# Patient Record
Sex: FEMALE | Race: WHITE | NOT HISPANIC OR LATINO | Employment: OTHER | URBAN - METROPOLITAN AREA
[De-identification: names, ages, dates, MRNs, and addresses within clinical notes are randomized per-mention and may not be internally consistent; named-entity substitution may affect disease eponyms.]

---

## 2018-07-05 ENCOUNTER — CONVERSION ENCOUNTER (OUTPATIENT)
Dept: MAMMOGRAPHY | Facility: HOSPITAL | Age: 63
End: 2018-07-05

## 2019-01-16 ENCOUNTER — HOSPITAL ENCOUNTER (OUTPATIENT)
Dept: OTHER | Facility: HOSPITAL | Age: 64
Discharge: HOME | End: 2019-01-16
Attending: INTERNAL MEDICINE

## 2019-01-16 LAB
25(OH)D3 SERPL-MCNC: 52.1 NG/ML (ref 30–100)
ANION GAP SERPL CALC-SCNC: 19 MMOL/L (ref 8–19)
APPEARANCE UR: CLEAR
BASOPHILS # BLD AUTO: 0.07 10*3/UL (ref 0–0.2)
BASOPHILS NFR BLD AUTO: 0.69 % (ref 0–3)
BILIRUB UR QL: NEGATIVE
BUN SERPL-MCNC: 42 MG/DL (ref 5–25)
BUN/CREAT SERPL: 14 {RATIO} (ref 6–20)
CALCIUM SERPL-MCNC: 9.2 MG/DL (ref 8.7–10.4)
CHLORIDE SERPL-SCNC: 108 MMOL/L (ref 99–111)
COLOR UR: YELLOW
CONV CO2: 19 MMOL/L (ref 22–32)
CONV COLLECTION SOURCE (UA): NORMAL
CONV CREATININE URINE, RANDOM: 51.3 MG/DL (ref 10–300)
CONV UROBILINOGEN IN URINE BY AUTOMATED TEST STRIP: 0.2 {EHRLICHU}/DL (ref 0.1–1)
CREAT UR-MCNC: 2.9 MG/DL (ref 0.5–0.9)
EOSINOPHIL # BLD AUTO: 0.48 10*3/UL (ref 0–0.7)
EOSINOPHIL # BLD AUTO: 4.53 % (ref 0–7)
ERYTHROCYTE [DISTWIDTH] IN BLOOD BY AUTOMATED COUNT: 12.9 % (ref 11.5–14.5)
FERRITIN SERPL-MCNC: 190 NG/ML (ref 10–200)
GFR SERPLBLD BASED ON 1.73 SQ M-ARVRAT: 16 ML/MIN/{1.73_M2}
GLUCOSE SERPL-MCNC: 90 MG/DL (ref 65–99)
GLUCOSE UR QL: NEGATIVE MG/DL
HBA1C MFR BLD: 9.98 G/DL (ref 12–16)
HCT VFR BLD AUTO: 28.6 % (ref 37–47)
HGB UR QL STRIP: NEGATIVE
IRON SATN MFR SERPL: 16 % (ref 20–55)
IRON SERPL-MCNC: 53 UG/DL (ref 60–170)
KETONES UR QL STRIP: NEGATIVE MG/DL
LEUKOCYTE ESTERASE UR QL STRIP: NEGATIVE
LYMPHOCYTES # BLD AUTO: 3.1 10*3/UL (ref 1–5)
MCH RBC QN AUTO: 30.6 PG (ref 27–31)
MCHC RBC AUTO-ENTMCNC: 34.9 G/DL (ref 33–37)
MCV RBC AUTO: 87.7 FL (ref 81–99)
MONOCYTES # BLD AUTO: 0.96 10*3/UL (ref 0.2–1.2)
MONOCYTES NFR BLD AUTO: 9.07 % (ref 3–10)
NEUTROPHILS # BLD AUTO: 5.94 10*3/UL (ref 2–8)
NEUTROPHILS NFR BLD AUTO: 56.3 % (ref 30–85)
NITRITE UR QL STRIP: NEGATIVE
NRBC BLD AUTO-RTO: 0 % (ref 0–0.01)
OSMOLALITY SERPL CALC.SUM OF ELEC: 302 MOSM/KG (ref 273–304)
PH UR STRIP.AUTO: 5 [PH] (ref 5–8)
PHOSPHATE SERPL-MCNC: 2.9 MG/DL (ref 2.4–4.5)
PLATELET # BLD AUTO: 288 10*3/UL (ref 130–400)
PMV BLD AUTO: 8.8 FL (ref 7.4–10.4)
POTASSIUM SERPL-SCNC: 4.7 MMOL/L (ref 3.5–5.3)
PROT UR QL: NEGATIVE MG/DL
PROT UR-MCNC: <4 MG/DL
PTH-INTACT SERPL-MCNC: 115.5 PG/ML (ref 11.1–79.5)
RBC # BLD AUTO: 3.26 10*6/UL (ref 4.2–5.4)
SODIUM SERPL-SCNC: 141 MMOL/L (ref 135–147)
SP GR UR: 1.02 (ref 1–1.03)
TIBC SERPL-MCNC: 322 UG/DL (ref 245–450)
TRANSFERRIN SERPL-MCNC: 225 MG/DL (ref 250–380)
VARIANT LYMPHS NFR BLD MANUAL: 29.4 % (ref 20–45)
WBC # BLD AUTO: 10.5 10*3/UL (ref 4.8–10.8)

## 2019-02-22 ENCOUNTER — OFFICE VISIT CONVERTED (OUTPATIENT)
Dept: INTERNAL MEDICINE | Facility: CLINIC | Age: 64
End: 2019-02-22
Attending: NURSE PRACTITIONER

## 2019-03-26 ENCOUNTER — OFFICE VISIT CONVERTED (OUTPATIENT)
Dept: GASTROENTEROLOGY | Facility: CLINIC | Age: 64
End: 2019-03-26
Attending: PHYSICIAN ASSISTANT

## 2019-04-10 ENCOUNTER — HOSPITAL ENCOUNTER (OUTPATIENT)
Dept: OTHER | Facility: HOSPITAL | Age: 64
Discharge: HOME | End: 2019-04-10
Attending: INTERNAL MEDICINE

## 2019-04-10 LAB
25(OH)D3 SERPL-MCNC: 76.8 NG/ML (ref 30–100)
ANION GAP SERPL CALC-SCNC: 20 MMOL/L (ref 8–19)
APPEARANCE UR: CLEAR
BASOPHILS # BLD AUTO: 0.05 10*3/UL (ref 0–0.2)
BASOPHILS NFR BLD AUTO: 0.5 % (ref 0–3)
BILIRUB UR QL: NEGATIVE
BUN SERPL-MCNC: 63 MG/DL (ref 5–25)
BUN/CREAT SERPL: 16 {RATIO} (ref 6–20)
CALCIUM SERPL-MCNC: 9.3 MG/DL (ref 8.7–10.4)
CHLORIDE SERPL-SCNC: 106 MMOL/L (ref 99–111)
CHOLEST SERPL-MCNC: 114 MG/DL (ref 107–200)
CHOLEST/HDLC SERPL: 2.3 {RATIO} (ref 3–6)
COLOR UR: YELLOW
CONV ABS IMM GRAN: 0.02 10*3/UL (ref 0–0.2)
CONV CO2: 20 MMOL/L (ref 22–32)
CONV COLLECTION SOURCE (UA): NORMAL
CONV CREATININE URINE, RANDOM: 79.1 MG/DL (ref 10–300)
CONV IMMATURE GRAN: 0.2 % (ref 0–1.8)
CONV UROBILINOGEN IN URINE BY AUTOMATED TEST STRIP: 0.2 {EHRLICHU}/DL (ref 0.1–1)
CREAT UR-MCNC: 3.86 MG/DL (ref 0.5–0.9)
DEPRECATED RDW RBC AUTO: 49.7 FL (ref 36.4–46.3)
EOSINOPHIL # BLD AUTO: 0.74 10*3/UL (ref 0–0.7)
EOSINOPHIL # BLD AUTO: 7.4 % (ref 0–7)
ERYTHROCYTE [DISTWIDTH] IN BLOOD BY AUTOMATED COUNT: 14.8 % (ref 11.7–14.4)
FERRITIN SERPL-MCNC: 236 NG/ML (ref 10–200)
GFR SERPLBLD BASED ON 1.73 SQ M-ARVRAT: 12 ML/MIN/{1.73_M2}
GLUCOSE SERPL-MCNC: 100 MG/DL (ref 65–99)
GLUCOSE UR QL: NEGATIVE MG/DL
HBA1C MFR BLD: 9.4 G/DL (ref 12–16)
HCT VFR BLD AUTO: 29.9 % (ref 37–47)
HDLC SERPL-MCNC: 50 MG/DL (ref 40–60)
HGB UR QL STRIP: NEGATIVE
IRON SATN MFR SERPL: 13 % (ref 20–55)
IRON SERPL-MCNC: 35 UG/DL (ref 60–170)
KETONES UR QL STRIP: NEGATIVE MG/DL
LDLC SERPL CALC-MCNC: 41 MG/DL (ref 70–100)
LEUKOCYTE ESTERASE UR QL STRIP: NEGATIVE
LYMPHOCYTES # BLD AUTO: 2.68 10*3/UL (ref 1–5)
MCH RBC QN AUTO: 28.8 PG (ref 27–31)
MCHC RBC AUTO-ENTMCNC: 31.4 G/DL (ref 33–37)
MCV RBC AUTO: 91.7 FL (ref 81–99)
MONOCYTES # BLD AUTO: 0.73 10*3/UL (ref 0.2–1.2)
MONOCYTES NFR BLD AUTO: 7.3 % (ref 3–10)
NEUTROPHILS # BLD AUTO: 5.81 10*3/UL (ref 2–8)
NEUTROPHILS NFR BLD AUTO: 57.9 % (ref 30–85)
NITRITE UR QL STRIP: NEGATIVE
NRBC CBCN: 0 % (ref 0–0.7)
OSMOLALITY SERPL CALC.SUM OF ELEC: 310 MOSM/KG (ref 273–304)
PH UR STRIP.AUTO: 5 [PH] (ref 5–8)
PHOSPHATE SERPL-MCNC: 4.1 MG/DL (ref 2.4–4.5)
PLATELET # BLD AUTO: 204 10*3/UL (ref 130–400)
PMV BLD AUTO: 12.1 FL (ref 9.4–12.3)
POTASSIUM SERPL-SCNC: 4.6 MMOL/L (ref 3.5–5.3)
PROT UR QL: NEGATIVE MG/DL
PROT UR-MCNC: <4 MG/DL
PTH-INTACT SERPL-MCNC: 83.3 PG/ML (ref 11.1–79.5)
RBC # BLD AUTO: 3.26 10*6/UL (ref 4.2–5.4)
SODIUM SERPL-SCNC: 141 MMOL/L (ref 135–147)
SP GR UR: 1.01 (ref 1–1.03)
T4 FREE SERPL-MCNC: 0.8 NG/DL (ref 0.9–1.8)
TIBC SERPL-MCNC: 276 UG/DL (ref 245–450)
TRANSFERRIN SERPL-MCNC: 193 MG/DL (ref 250–380)
TRIGL SERPL-MCNC: 116 MG/DL (ref 40–150)
TSH SERPL-ACNC: 3.61 M[IU]/L (ref 0.27–4.2)
VARIANT LYMPHS NFR BLD MANUAL: 26.7 % (ref 20–45)
VLDLC SERPL-MCNC: 23 MG/DL (ref 5–37)
WBC # BLD AUTO: 10.03 10*3/UL (ref 4.8–10.8)

## 2019-04-22 ENCOUNTER — HOSPITAL ENCOUNTER (OUTPATIENT)
Dept: GASTROENTEROLOGY | Facility: HOSPITAL | Age: 64
Setting detail: HOSPITAL OUTPATIENT SURGERY
Discharge: HOME | End: 2019-04-22
Attending: INTERNAL MEDICINE

## 2019-05-01 ENCOUNTER — OFFICE VISIT CONVERTED (OUTPATIENT)
Dept: INTERNAL MEDICINE | Facility: CLINIC | Age: 64
End: 2019-05-01
Attending: NURSE PRACTITIONER

## 2019-05-02 ENCOUNTER — HOSPITAL ENCOUNTER (OUTPATIENT)
Dept: INFUSION THERAPY | Facility: HOSPITAL | Age: 64
Setting detail: RECURRING SERIES
Discharge: HOME | End: 2019-05-31
Attending: INTERNAL MEDICINE

## 2019-06-03 ENCOUNTER — OFFICE VISIT CONVERTED (OUTPATIENT)
Dept: INTERNAL MEDICINE | Facility: CLINIC | Age: 64
End: 2019-06-03
Attending: NURSE PRACTITIONER

## 2019-06-03 ENCOUNTER — CONVERSION ENCOUNTER (OUTPATIENT)
Dept: INTERNAL MEDICINE | Facility: CLINIC | Age: 64
End: 2019-06-03

## 2019-06-03 ENCOUNTER — HOSPITAL ENCOUNTER (OUTPATIENT)
Dept: OTHER | Facility: HOSPITAL | Age: 64
Discharge: HOME | End: 2019-06-03
Attending: NURSE PRACTITIONER

## 2019-06-03 PROBLEM — E78.5 HYPERLIPIDEMIA: Status: ACTIVE | Noted: 2019-06-03

## 2019-06-03 PROBLEM — I10 ESSENTIAL HYPERTENSION: Status: ACTIVE | Noted: 2019-06-03

## 2019-06-03 PROBLEM — R12 HEARTBURN: Status: ACTIVE | Noted: 2019-06-03

## 2019-06-03 PROBLEM — K21.9 GERD (GASTROESOPHAGEAL REFLUX DISEASE): Status: ACTIVE | Noted: 2019-06-03

## 2019-06-03 PROBLEM — M25.473 ANKLE SWELLING: Status: ACTIVE | Noted: 2019-06-03

## 2019-06-03 PROBLEM — M25.50 MULTIPLE JOINT PAIN: Status: ACTIVE | Noted: 2019-06-03

## 2019-06-03 PROBLEM — F41.9 ANXIETY: Status: ACTIVE | Noted: 2019-06-03

## 2019-06-03 PROBLEM — G47.33 OSA (OBSTRUCTIVE SLEEP APNEA): Status: ACTIVE | Noted: 2019-06-03

## 2019-06-03 PROBLEM — R53.82 CHRONIC FATIGUE: Status: ACTIVE | Noted: 2019-06-03

## 2019-06-03 LAB
APPEARANCE UR: ABNORMAL
BILIRUB UR QL: NEGATIVE
COLOR UR: YELLOW
CONV BACTERIA: NEGATIVE
CONV COLLECTION SOURCE (UA): ABNORMAL
CONV HYALINE CASTS IN URINE MICRO: ABNORMAL /[LPF]
CONV UROBILINOGEN IN URINE BY AUTOMATED TEST STRIP: 0.2 {EHRLICHU}/DL (ref 0.1–1)
GLUCOSE UR QL: NEGATIVE MG/DL
HGB UR QL STRIP: NEGATIVE
KETONES UR QL STRIP: NEGATIVE MG/DL
LEUKOCYTE ESTERASE UR QL STRIP: ABNORMAL
NITRITE UR QL STRIP: NEGATIVE
PH UR STRIP.AUTO: 5.5 [PH] (ref 5–8)
PROT UR QL: NEGATIVE MG/DL
RBC #/AREA URNS HPF: ABNORMAL /[HPF]
SP GR UR: 1.01 (ref 1–1.03)
SQUAMOUS SPT QL MICRO: ABNORMAL /[HPF]
WBC #/AREA URNS HPF: ABNORMAL /[HPF]

## 2019-06-05 LAB — BACTERIA UR CULT: NORMAL

## 2019-06-11 ENCOUNTER — HOSPITAL ENCOUNTER (OUTPATIENT)
Dept: OTHER | Facility: HOSPITAL | Age: 64
Discharge: HOME | End: 2019-06-11
Attending: INTERNAL MEDICINE

## 2019-06-11 LAB
25(OH)D3 SERPL-MCNC: 86.4 NG/ML (ref 30–100)
ANION GAP SERPL CALC-SCNC: 19 MMOL/L (ref 8–19)
APPEARANCE UR: ABNORMAL
BASOPHILS # BLD AUTO: 0.07 10*3/UL (ref 0–0.2)
BASOPHILS NFR BLD AUTO: 0.6 % (ref 0–3)
BILIRUB UR QL: NEGATIVE
BUN SERPL-MCNC: 38 MG/DL (ref 5–25)
BUN/CREAT SERPL: 13 {RATIO} (ref 6–20)
CALCIUM SERPL-MCNC: 9.7 MG/DL (ref 8.7–10.4)
CHLORIDE SERPL-SCNC: 103 MMOL/L (ref 99–111)
COLOR UR: YELLOW
CONV ABS IMM GRAN: 0.06 10*3/UL (ref 0–0.2)
CONV BACTERIA: ABNORMAL
CONV CO2: 26 MMOL/L (ref 22–32)
CONV COLLECTION SOURCE (UA): ABNORMAL
CONV CREATININE URINE, RANDOM: 86.1 MG/DL (ref 10–300)
CONV IMMATURE GRAN: 0.5 % (ref 0–1.8)
CONV UROBILINOGEN IN URINE BY AUTOMATED TEST STRIP: 0.2 {EHRLICHU}/DL (ref 0.1–1)
CREAT UR-MCNC: 2.84 MG/DL (ref 0.5–0.9)
DEPRECATED RDW RBC AUTO: 52.3 FL (ref 36.4–46.3)
EOSINOPHIL # BLD AUTO: 0.56 10*3/UL (ref 0–0.7)
EOSINOPHIL # BLD AUTO: 4.6 % (ref 0–7)
ERYTHROCYTE [DISTWIDTH] IN BLOOD BY AUTOMATED COUNT: 15.3 % (ref 11.7–14.4)
FERRITIN SERPL-MCNC: 687 NG/ML (ref 10–200)
GFR SERPLBLD BASED ON 1.73 SQ M-ARVRAT: 17 ML/MIN/{1.73_M2}
GLUCOSE SERPL-MCNC: 166 MG/DL (ref 65–99)
GLUCOSE UR QL: NEGATIVE MG/DL
HBA1C MFR BLD: 11.9 G/DL (ref 12–16)
HCT VFR BLD AUTO: 38.2 % (ref 37–47)
HGB UR QL STRIP: NEGATIVE
IRON SATN MFR SERPL: 23 % (ref 20–55)
IRON SERPL-MCNC: 64 UG/DL (ref 60–170)
KETONES UR QL STRIP: NEGATIVE MG/DL
LEUKOCYTE ESTERASE UR QL STRIP: ABNORMAL
LYMPHOCYTES # BLD AUTO: 3.33 10*3/UL (ref 1–5)
MCH RBC QN AUTO: 28.7 PG (ref 27–31)
MCHC RBC AUTO-ENTMCNC: 31.2 G/DL (ref 33–37)
MCV RBC AUTO: 92.3 FL (ref 81–99)
MONOCYTES # BLD AUTO: 0.48 10*3/UL (ref 0.2–1.2)
MONOCYTES NFR BLD AUTO: 4 % (ref 3–10)
NEUTROPHILS # BLD AUTO: 7.65 10*3/UL (ref 2–8)
NEUTROPHILS NFR BLD AUTO: 62.9 % (ref 30–85)
NITRITE UR QL STRIP: NEGATIVE
NRBC CBCN: 0 % (ref 0–0.7)
OSMOLALITY SERPL CALC.SUM OF ELEC: 311 MOSM/KG (ref 273–304)
PH UR STRIP.AUTO: 5.5 [PH] (ref 5–8)
PHOSPHATE SERPL-MCNC: 2.9 MG/DL (ref 2.4–4.5)
PLATELET # BLD AUTO: 241 10*3/UL (ref 130–400)
PMV BLD AUTO: 12.4 FL (ref 9.4–12.3)
POTASSIUM SERPL-SCNC: 3.5 MMOL/L (ref 3.5–5.3)
PROT UR QL: NEGATIVE MG/DL
PROT UR-MCNC: 5.9 MG/DL
PTH-INTACT SERPL-MCNC: 77.1 PG/ML (ref 11.1–79.5)
RBC # BLD AUTO: 4.14 10*6/UL (ref 4.2–5.4)
RBC #/AREA URNS HPF: ABNORMAL /[HPF]
SODIUM SERPL-SCNC: 144 MMOL/L (ref 135–147)
SP GR UR: 1.01 (ref 1–1.03)
T4 FREE SERPL-MCNC: 0.9 NG/DL (ref 0.9–1.8)
TIBC SERPL-MCNC: 280 UG/DL (ref 245–450)
TRANSFERRIN SERPL-MCNC: 196 MG/DL (ref 250–380)
TSH SERPL-ACNC: 3.63 M[IU]/L (ref 0.27–4.2)
VARIANT LYMPHS NFR BLD MANUAL: 27.4 % (ref 20–45)
WBC # BLD AUTO: 12.15 10*3/UL (ref 4.8–10.8)
WBC #/AREA URNS HPF: ABNORMAL /[HPF]

## 2019-06-14 DIAGNOSIS — G47.33 OSA (OBSTRUCTIVE SLEEP APNEA): ICD-10-CM

## 2019-06-14 DIAGNOSIS — K21.9 GASTROESOPHAGEAL REFLUX DISEASE, ESOPHAGITIS PRESENCE NOT SPECIFIED: ICD-10-CM

## 2019-06-14 DIAGNOSIS — E78.5 HYPERLIPIDEMIA, UNSPECIFIED HYPERLIPIDEMIA TYPE: ICD-10-CM

## 2019-06-14 DIAGNOSIS — E66.01 OBESITY, CLASS III, BMI 40-49.9 (MORBID OBESITY) (HCC): Primary | ICD-10-CM

## 2019-06-14 DIAGNOSIS — M25.50 MULTIPLE JOINT PAIN: ICD-10-CM

## 2019-06-14 DIAGNOSIS — I10 ESSENTIAL HYPERTENSION: ICD-10-CM

## 2019-06-14 PROBLEM — E66.813 OBESITY, CLASS III, BMI 40-49.9 (MORBID OBESITY): Status: ACTIVE | Noted: 2019-06-14

## 2019-06-17 ENCOUNTER — LAB (OUTPATIENT)
Dept: LAB | Facility: HOSPITAL | Age: 64
End: 2019-06-17

## 2019-06-17 ENCOUNTER — CONSULT (OUTPATIENT)
Dept: BARIATRICS/WEIGHT MGMT | Facility: CLINIC | Age: 64
End: 2019-06-17

## 2019-06-17 VITALS
WEIGHT: 290 LBS | SYSTOLIC BLOOD PRESSURE: 140 MMHG | HEIGHT: 61 IN | HEART RATE: 61 BPM | DIASTOLIC BLOOD PRESSURE: 84 MMHG | RESPIRATION RATE: 18 BRPM | BODY MASS INDEX: 54.75 KG/M2 | TEMPERATURE: 97 F

## 2019-06-17 DIAGNOSIS — R53.82 CHRONIC FATIGUE: ICD-10-CM

## 2019-06-17 DIAGNOSIS — G47.33 OSA (OBSTRUCTIVE SLEEP APNEA): ICD-10-CM

## 2019-06-17 DIAGNOSIS — M25.473 ANKLE SWELLING, UNSPECIFIED LATERALITY: ICD-10-CM

## 2019-06-17 DIAGNOSIS — K21.9 GASTROESOPHAGEAL REFLUX DISEASE, ESOPHAGITIS PRESENCE NOT SPECIFIED: ICD-10-CM

## 2019-06-17 DIAGNOSIS — I10 ESSENTIAL HYPERTENSION: ICD-10-CM

## 2019-06-17 DIAGNOSIS — E78.5 HYPERLIPIDEMIA, UNSPECIFIED HYPERLIPIDEMIA TYPE: ICD-10-CM

## 2019-06-17 DIAGNOSIS — M25.50 MULTIPLE JOINT PAIN: ICD-10-CM

## 2019-06-17 DIAGNOSIS — F41.9 ANXIETY: ICD-10-CM

## 2019-06-17 DIAGNOSIS — E66.01 OBESITY, CLASS III, BMI 40-49.9 (MORBID OBESITY) (HCC): ICD-10-CM

## 2019-06-17 PROBLEM — E55.9 VITAMIN D DEFICIENCY: Status: ACTIVE | Noted: 2017-12-29

## 2019-06-17 PROBLEM — N18.4: Status: ACTIVE | Noted: 2017-09-29

## 2019-06-17 PROBLEM — N18.9 CHRONIC KIDNEY DISEASE: Status: ACTIVE | Noted: 2017-09-29

## 2019-06-17 LAB
ALBUMIN SERPL-MCNC: 4.2 G/DL (ref 3.5–5.2)
ALBUMIN/GLOB SERPL: 1.2 G/DL
ALP SERPL-CCNC: 98 U/L (ref 39–117)
ALT SERPL W P-5'-P-CCNC: 19 U/L (ref 1–33)
ANION GAP SERPL CALCULATED.3IONS-SCNC: 15.1 MMOL/L
AST SERPL-CCNC: 22 U/L (ref 1–32)
BASOPHILS # BLD AUTO: 0.06 10*3/MM3 (ref 0–0.2)
BASOPHILS NFR BLD AUTO: 0.5 % (ref 0–1.5)
BILIRUB SERPL-MCNC: 0.3 MG/DL (ref 0.2–1.2)
BUN BLD-MCNC: 39 MG/DL (ref 8–23)
BUN/CREAT SERPL: 13.6 (ref 7–25)
CALCIUM SPEC-SCNC: 9.8 MG/DL (ref 8.6–10.5)
CHLORIDE SERPL-SCNC: 102 MMOL/L (ref 98–107)
CHOLEST SERPL-MCNC: 138 MG/DL (ref 0–200)
CO2 SERPL-SCNC: 24.9 MMOL/L (ref 22–29)
CREAT BLD-MCNC: 2.87 MG/DL (ref 0.57–1)
DEPRECATED RDW RBC AUTO: 52.7 FL (ref 37–54)
EOSINOPHIL # BLD AUTO: 0.56 10*3/MM3 (ref 0–0.4)
EOSINOPHIL NFR BLD AUTO: 5.1 % (ref 0.3–6.2)
ERYTHROCYTE [DISTWIDTH] IN BLOOD BY AUTOMATED COUNT: 15.4 % (ref 12.3–15.4)
GFR SERPL CREATININE-BSD FRML MDRD: 17 ML/MIN/1.73
GLOBULIN UR ELPH-MCNC: 3.4 GM/DL
GLUCOSE BLD-MCNC: 104 MG/DL (ref 65–99)
HBA1C MFR BLD: 5.1 % (ref 4.8–5.6)
HCT VFR BLD AUTO: 36.4 % (ref 34–46.6)
HDLC SERPL-MCNC: 50 MG/DL (ref 40–60)
HGB BLD-MCNC: 11.2 G/DL (ref 12–15.9)
IMM GRANULOCYTES # BLD AUTO: 0.05 10*3/MM3 (ref 0–0.05)
IMM GRANULOCYTES NFR BLD AUTO: 0.5 % (ref 0–0.5)
LDLC SERPL CALC-MCNC: 60 MG/DL (ref 0–100)
LDLC/HDLC SERPL: 1.2 {RATIO}
LYMPHOCYTES # BLD AUTO: 3.66 10*3/MM3 (ref 0.7–3.1)
LYMPHOCYTES NFR BLD AUTO: 33.1 % (ref 19.6–45.3)
MCH RBC QN AUTO: 28.7 PG (ref 26.6–33)
MCHC RBC AUTO-ENTMCNC: 30.8 G/DL (ref 31.5–35.7)
MCV RBC AUTO: 93.3 FL (ref 79–97)
MONOCYTES # BLD AUTO: 0.92 10*3/MM3 (ref 0.1–0.9)
MONOCYTES NFR BLD AUTO: 8.3 % (ref 5–12)
NEUTROPHILS # BLD AUTO: 5.82 10*3/MM3 (ref 1.7–7)
NEUTROPHILS NFR BLD AUTO: 52.5 % (ref 42.7–76)
NRBC BLD AUTO-RTO: 0 /100 WBC (ref 0–0.2)
PLATELET # BLD AUTO: 211 10*3/MM3 (ref 140–450)
PMV BLD AUTO: 12 FL (ref 6–12)
POTASSIUM BLD-SCNC: 3.7 MMOL/L (ref 3.5–5.2)
PROT SERPL-MCNC: 7.6 G/DL (ref 6–8.5)
RBC # BLD AUTO: 3.9 10*6/MM3 (ref 3.77–5.28)
SODIUM BLD-SCNC: 142 MMOL/L (ref 136–145)
TRIGL SERPL-MCNC: 140 MG/DL (ref 0–150)
TSH SERPL DL<=0.05 MIU/L-ACNC: 7.91 MIU/ML (ref 0.27–4.2)
VLDLC SERPL-MCNC: 28 MG/DL (ref 5–40)
WBC NRBC COR # BLD: 11.07 10*3/MM3 (ref 3.4–10.8)

## 2019-06-17 PROCEDURE — 85025 COMPLETE CBC W/AUTO DIFF WBC: CPT

## 2019-06-17 PROCEDURE — 84443 ASSAY THYROID STIM HORMONE: CPT

## 2019-06-17 PROCEDURE — 36415 COLL VENOUS BLD VENIPUNCTURE: CPT

## 2019-06-17 PROCEDURE — 83036 HEMOGLOBIN GLYCOSYLATED A1C: CPT

## 2019-06-17 PROCEDURE — 80053 COMPREHEN METABOLIC PANEL: CPT

## 2019-06-17 PROCEDURE — 80061 LIPID PANEL: CPT

## 2019-06-17 PROCEDURE — 99205 OFFICE O/P NEW HI 60 MIN: CPT | Performed by: NURSE PRACTITIONER

## 2019-06-17 NOTE — PROGRESS NOTES
[facility] BARIATRIC [facility] BARIATRIC SURGERY  [address] [facility] [address]  [facility] [id number] [phone]  [address] [address] [phone]  Dept: [phone]  6/17/2019      Ro Connor.  [id number]  [id number]  1955  female      Chief Complaint of weight gain; unable to maintain weight loss    History of Present Illness:   Ro is a 63 y.o. female who presents today for evaluation, education and consultation regarding weight loss surgery. The patient is interested in the sleeve gastrectomy.      Diet History:Ro has been overweight for at least 50 years, has been 35 pounds or more overweight for at least 46 years, has been 100 pounds or more overweight for 15 or more years and started dieting at age 15.  The most weight Ro lost was 50 pounds on weight watchers and maintained the weight loss for 2 years. Ro describes her eating habits as drinking coffee and soda eating to cope with anxiety making dietary choices out of convenience. Ro Connor has tried Atkins, Weight Watchers, Fasting and reduced calorie among others with success of losing up to 50 pounds, but in each instance regained the weight.   See dietician documentation for complete history.    Bariatric Surgery Evaluation: The patient is being seen for an initial visit for bariatric surgery evaluation.     Bariatric Co-morbidities:  sleep apnea, hypertension, dyslipidemia, GERD, edema and mental health disease    Patient Active Problem List   Diagnosis   • Chronic fatigue   • Ankle swelling   • Essential hypertension   • Hyperlipidemia   • SEVEN (obstructive sleep apnea)   • GERD (gastroesophageal reflux disease)   • Heartburn   • Multiple joint pain   • Anxiety   • Body mass index (BMI) of 50-59.9 in adult (CMS/HCC)   • Renal failure, chronic, stage 4 (severe) (CMS/HCC)   • Other kyphoscoliosis and scoliosis   • Vitamin D deficiency       History reviewed. No pertinent  past medical history.    Past Surgical History:   Procedure Laterality Date   • COLONOSCOPY  2014   • ENDOSCOPY  2009       No Known Allergies      Current Outpatient Medications:   •  aspirin 81 MG EC tablet, Take 81 mg by mouth Daily., Disp: , Rfl:   •  calcium carbonate (OS-TUTU) 600 MG tablet, Take 600 mg by mouth Daily., Disp: , Rfl:   •  carvedilol (COREG) 6.25 MG tablet, Take 6.25 mg by mouth 2 (Two) Times a Day With Meals., Disp: , Rfl:   •  escitalopram (LEXAPRO) 20 MG tablet, Take 20 mg by mouth Daily., Disp: , Rfl:   •  ferrous sulfate 325 (65 FE) MG tablet, Take 325 mg by mouth Daily With Breakfast., Disp: , Rfl:   •  furosemide (LASIX) 20 MG tablet, Take 20 mg by mouth 2 (Two) Times a Day., Disp: , Rfl:   •  Glucosamine-Chondroitin (GLUCOSAMINE CHONDR COMPLEX PO), Take  by mouth., Disp: , Rfl:   •  HYDROcodone-acetaminophen (NORCO) 7.5-325 MG per tablet, Take 1 tablet by mouth Every 6 (Six) Hours As Needed for Moderate Pain ., Disp: , Rfl:   •  hydrOXYzine (ATARAX) 50 MG tablet, Take 50 mg by mouth 3 (Three) Times a Day As Needed for Itching., Disp: , Rfl:   •  methIMAzole (TAPAZOLE) 5 MG tablet, Take 5 mg by mouth 3 (Three) Times a Day., Disp: , Rfl:   •  pantoprazole (PROTONIX) 40 MG EC tablet, Take 40 mg by mouth Daily., Disp: , Rfl:   •  Pediatric Multiple Vit-C-FA (FLINSTONES GUMMIES OMEGA-3 DHA PO), Take  by mouth., Disp: , Rfl:   •  raNITIdine (ZANTAC) 150 MG tablet, Take 150 mg by mouth 2 (Two) Times a Day., Disp: , Rfl:   •  simvastatin (ZOCOR) 20 MG tablet, Take 20 mg by mouth Every Night., Disp: , Rfl:   •  traZODone (DESYREL) 50 MG tablet, Take 50 mg by mouth Every Night., Disp: , Rfl:   •  vitamin D (ERGOCALCIFEROL) 66962 units capsule capsule, Take 50,000 Units by mouth 1 (One) Time Per Week., Disp: , Rfl:     Social History     Socioeconomic History   • Marital status:      Spouse name: Not on file   • Number of children: Not on file   • Years of education: Not on file   • Highest  education level: Not on file   Tobacco Use   • Smoking status: Never Smoker   • Smokeless tobacco: Never Used   Substance and Sexual Activity   • Alcohol use: Yes   • Drug use: No       Family History   Problem Relation Age of Onset   • Obesity Mother    • Hypertension Mother    • Obesity Sister    • Hypertension Sister    • Stroke Sister    • Obesity Brother    • Hypertension Brother    • Heart attack Brother    • Obesity Maternal Grandmother    • Hypertension Maternal Grandmother    • Cancer Maternal Grandmother         BRAIN   • Cancer Maternal Grandfather    • Hypertension Paternal Grandmother    • Hypertension Brother    • Hypertension Sister    • Stroke Sister          Review of Systems:  Review of Systems   Constitutional: Positive for appetite change. Negative for fatigue and unexpected weight change.   HENT: Negative.    Eyes: Negative.    Respiratory: Negative.    Cardiovascular: Negative.  Negative for leg swelling.   Gastrointestinal: Negative for abdominal distention, abdominal pain, constipation, diarrhea, nausea and vomiting.   Genitourinary: Negative for difficulty urinating, frequency and urgency.   Musculoskeletal: Negative for back pain.   Skin: Negative.    Psychiatric/Behavioral: Negative.    All other systems reviewed and are negative.      Physical Exam:  Vital Signs:  Weight: 132 kg (290 lb)   Body mass index is 55.47 kg/m².  Temp: 97 °F (36.1 °C)   Heart Rate: 61   BP: 140/84     Physical Exam   Constitutional: She is oriented to person, place, and time. She appears well-developed and well-nourished.   HENT:   Head: Normocephalic and atraumatic.   Neck: Normal range of motion.   Cardiovascular: Normal rate, regular rhythm and normal heart sounds.   Pulmonary/Chest: Effort normal and breath sounds normal. No respiratory distress. She has no wheezes.   Abdominal: Soft. Bowel sounds are normal. She exhibits no distension. There is no tenderness.   Musculoskeletal: She exhibits no edema or  deformity.   Neurological: She is alert and oriented to person, place, and time.   Skin: Skin is warm and dry.   Psychiatric: She has a normal mood and affect. Her behavior is normal.   Nursing note and vitals reviewed.         Assessment:         Ro Connor is a 63 y.o. year old female with medically complicated severe obesity. Weight: 132 kg (290 lb), Body mass index is 55.47 kg/m². and weight related problems including sleep apnea, hypertension, dyslipidemia, GERD, edema and mental health disease.    I explained in detail the procedures that we are performing.  All of those procedures can be performed laparoscopically but there is a chance to convert to open if any technical challenges or complications do occur.  Bariatric surgery is not cosmetic surgery but rather a tool to help a patient make a life-long commitment lifestyle changes including diet, exercise, behavior changes, and taking supplemental vitamins and minerals.    Due to the patient's BMI and co-morbidities they are at a high risk for surgery and will obtain the following:  The patient has been advised that a letter of medical support and a history and physical must be obtained from her primary care physician. A psychological evaluation will be arranged for this patient. CBC, CMP, FLP, TSH and HgbA1C will be drawn. Ro Connor will obtain a pre-operative CXR and EKG. she will obtain clearance from her nephrologist regarding her chronic kidney disease and protein restriction.    Ro Connor will be set up for a pre-operative diagnostic esophagogastroduodenoscopy with biopsy for evaluation. The risks and benefits of the procedure were discussed with the patient in detail and all questions were answered.  Possibility of perforation, bleeding, aspiration, anoxic brain injury, respiratory and/or cardiac arrest and death were discussed.   She received handouts regarding, all questions were answered and informed consent was  obtained.     The risks, benefits, alternatives, and potential complications of all of the procedures were explained in detail including, but not limited to death, anesthesia and medication adverse effect/DVT, pulmonary embolism, trocar site/incisional hernia, wound infection, abdominal infection, bleeding, failure to lose weight or gain weight and change in body image, metabolic complications with calcium, thiamine, vitamin B12, folate, iron, and anemia.    The patient was advised to start a high protein, low fat and low carbohydrate diet. The patient was given individualized information by our dietician along with general group information and handouts.       The patient was given information regarding the BECKI educational video. BECKI is an internet based educational video which explains the surgical procedure and answers basic questions regarding the procedure. The patient was provided with instructions and a password to watch the video.    The patient was encouraged to start routine exercise including but not limited to 150 minutes per week. The patient received a resistance band along with a handout of exercises.     The consultation plan was reviewed with the patient.    The patient understands the surgical procedures and the different surgical options that are available.  She understands the lifestyle changes that would be required after surgery and has agreed to participate in a pre-operative and postoperative weight management program.  She also expressed understanding of possible risks, had several questions answered and desires to proceed.    I think she is a good candidate for this surgery, and is interested in a sleeve gastrectomy.    Encounter Diagnoses   Name Primary?   • Body mass index (BMI) of 50-59.9 in adult (CMS/[facility]) Yes   • Gastroesophageal reflux disease, esophagitis presence not specified    • Essential hypertension    • SEVEN (obstructive sleep apnea)    • Hyperlipidemia, unspecified  hyperlipidemia type    • Multiple joint pain    • Ankle swelling, unspecified laterality    • Anxiety    • Chronic fatigue        Plan:    Patient will have evaluations and follow up with bariatric dieticians and a psychologist before undergoing a multidisciplinary review of her candidacy.  We also discussed the weight loss requirement and rationale, and other program requirements.      Idania Chacon, MAYUR  6/17/2019

## 2019-06-18 NOTE — PROGRESS NOTES
"Bariatric Nutrition Counseling Interview    Patient Name:  Ro Connor  YOB: 1955  Age:  63 y.o.  Sex:  female  MRN: [id number]  Date:  06/18/19    Procedure Considering:      Last Documented Height:    Ht Readings from Last 1 Encounters:   06/17/19 154 cm (60.63\")     Last Documented Weight:   Wt Readings from Last 1 Encounters:   06/17/19 132 kg (290 lb)      Body mass index is 55.47 kg/m².    Highest Weight:  254  Goal Weight: 150-175    History:  History reviewed. No pertinent past medical history.  Past Surgical History:   Procedure Laterality Date   • COLONOSCOPY  2014   • ENDOSCOPY  2009     Family History   Problem Relation Age of Onset   • Obesity Mother    • Hypertension Mother    • Obesity Sister    • Hypertension Sister    • Stroke Sister    • Obesity Brother    • Hypertension Brother    • Heart attack Brother    • Obesity Maternal Grandmother    • Hypertension Maternal Grandmother    • Cancer Maternal Grandmother         BRAIN   • Cancer Maternal Grandfather    • Hypertension Paternal Grandmother    • Hypertension Brother    • Hypertension Sister    • Stroke Sister      Social History     Socioeconomic History   • Marital status:      Spouse name: Not on file   • Number of children: Not on file   • Years of education: Not on file   • Highest education level: Not on file   Tobacco Use   • Smoking status: Never Smoker   • Smokeless tobacco: Never Used   Substance and Sexual Activity   • Alcohol use: Yes   • Drug use: No     Additional Health Issues to Consider:  Stage 4 CKD per pt    Weight History:  Always been overweight    Previous Weight Loss Efforts:  Weight Watchers, [employer] diet, Calorie counting, Over the counter weight loss aids, High protein low carb diet  Most Successful Weight Loss Effort:  Weight Watchers    Eating Habits: Eat in response to stress  Eat three meals on most days?  unknown  Worst eating habit?  Particular food craving    How often do you eat " fast food? 2 times monthly    Do you exercise regularly? (at least 3 times each week)  No    Occupation:  Retired    Personal Goal After Procedure:  Lose weight to qualify for kidney transplant     Assessment:  Reviewed nutrition program for successful weight loss following surgery. Discussed personal habits and lifestyle behaviors that may influence diet efforts. Program materials, including a reduced calorie diet regimen, were provided, discussed, and recommended as the plan to follow for pre- and post-surgery efforts. The significance of routine exercise was discussed. She demonstrated a good comprehension of diet requirements as well as a commitment to work on personal challenges.  Provided pt with short term goals to meet prior to 2-week consult appointment and is to report on progress at follow up visits. Per pt, nephrologist wants to limit protein intake to 25g per day with no fluid restriction. Will advise pt on low erasmo, low carb liver shrinking diet and to meet protein needs per nephrologist. She is a good candidate for bariatric surgery.    Electronically signed by:  Crystal Harman RD  06/18/19 2:10 PM

## 2019-06-19 ENCOUNTER — TELEPHONE (OUTPATIENT)
Dept: BARIATRICS/WEIGHT MGMT | Facility: CLINIC | Age: 64
End: 2019-06-19

## 2019-06-19 NOTE — TELEPHONE ENCOUNTER
Spoke to patient regarding her intake lab results. Sent letter of lab results to patient to share with her PCP.

## 2019-06-20 ENCOUNTER — PREP FOR SURGERY (OUTPATIENT)
Dept: OTHER | Facility: HOSPITAL | Age: 64
End: 2019-06-20

## 2019-06-20 ENCOUNTER — HOSPITAL ENCOUNTER (OUTPATIENT)
Dept: OTHER | Facility: HOSPITAL | Age: 64
Discharge: HOME | End: 2019-06-20

## 2019-06-26 ENCOUNTER — TELEPHONE (OUTPATIENT)
Dept: BARIATRICS/WEIGHT MGMT | Facility: CLINIC | Age: 64
End: 2019-06-26

## 2019-06-26 DIAGNOSIS — N18.4 RENAL FAILURE, CHRONIC, STAGE 4 (SEVERE) (HCC): ICD-10-CM

## 2019-06-26 DIAGNOSIS — E55.9 VITAMIN D DEFICIENCY: ICD-10-CM

## 2019-06-26 DIAGNOSIS — E78.5 HYPERLIPIDEMIA, UNSPECIFIED HYPERLIPIDEMIA TYPE: ICD-10-CM

## 2019-06-26 DIAGNOSIS — M25.473 ANKLE SWELLING, UNSPECIFIED LATERALITY: ICD-10-CM

## 2019-06-26 DIAGNOSIS — I10 ESSENTIAL HYPERTENSION: ICD-10-CM

## 2019-06-26 NOTE — TELEPHONE ENCOUNTER
Spoke to patient and instructed her that we will need cardiac clearance to proceed with surgery. Patient gave verbal understanding. Referral has been sent.

## 2019-07-03 DIAGNOSIS — G47.33 OSA (OBSTRUCTIVE SLEEP APNEA): ICD-10-CM

## 2019-07-03 DIAGNOSIS — R53.82 CHRONIC FATIGUE: ICD-10-CM

## 2019-07-03 DIAGNOSIS — I10 ESSENTIAL HYPERTENSION: ICD-10-CM

## 2019-07-08 ENCOUNTER — HOSPITAL ENCOUNTER (OUTPATIENT)
Dept: MAMMOGRAPHY | Facility: HOSPITAL | Age: 64
Discharge: HOME | End: 2019-07-08
Attending: NURSE PRACTITIONER

## 2019-07-10 ENCOUNTER — CONVERSION ENCOUNTER (OUTPATIENT)
Dept: INTERNAL MEDICINE | Facility: CLINIC | Age: 64
End: 2019-07-10

## 2019-07-10 ENCOUNTER — HOSPITAL ENCOUNTER (OUTPATIENT)
Dept: OTHER | Facility: HOSPITAL | Age: 64
Discharge: HOME | End: 2019-07-10
Attending: NURSE PRACTITIONER

## 2019-07-10 ENCOUNTER — OFFICE VISIT CONVERTED (OUTPATIENT)
Dept: INTERNAL MEDICINE | Facility: CLINIC | Age: 64
End: 2019-07-10
Attending: NURSE PRACTITIONER

## 2019-07-10 LAB
T4 FREE SERPL-MCNC: 0.9 NG/DL (ref 0.9–1.8)
TSH SERPL-ACNC: 5.87 M[IU]/L (ref 0.27–4.2)

## 2019-07-18 ENCOUNTER — OFFICE VISIT (OUTPATIENT)
Dept: CARDIOLOGY | Facility: CLINIC | Age: 64
End: 2019-07-18

## 2019-07-18 VITALS
SYSTOLIC BLOOD PRESSURE: 130 MMHG | WEIGHT: 228.9 LBS | HEIGHT: 60 IN | HEART RATE: 66 BPM | DIASTOLIC BLOOD PRESSURE: 78 MMHG | BODY MASS INDEX: 44.94 KG/M2

## 2019-07-18 DIAGNOSIS — I10 ESSENTIAL HYPERTENSION: Primary | ICD-10-CM

## 2019-07-18 DIAGNOSIS — E78.5 HYPERLIPIDEMIA, UNSPECIFIED HYPERLIPIDEMIA TYPE: ICD-10-CM

## 2019-07-18 PROCEDURE — 99203 OFFICE O/P NEW LOW 30 MIN: CPT | Performed by: INTERNAL MEDICINE

## 2019-07-18 RX ORDER — CHOLECALCIFEROL (VITAMIN D3) 125 MCG
5 CAPSULE ORAL
COMMUNITY
End: 2019-11-07

## 2019-07-23 ENCOUNTER — HOSPITAL ENCOUNTER (OUTPATIENT)
Dept: ULTRASOUND IMAGING | Facility: HOSPITAL | Age: 64
Discharge: HOME | End: 2019-07-23
Attending: NURSE PRACTITIONER

## 2019-07-23 LAB — 25(OH)D3 SERPL-MCNC: 67.4 NG/ML (ref 30–100)

## 2019-08-05 ENCOUNTER — TELEPHONE (OUTPATIENT)
Dept: BARIATRICS/WEIGHT MGMT | Facility: CLINIC | Age: 64
End: 2019-08-05

## 2019-08-05 NOTE — TELEPHONE ENCOUNTER
PETEM for pt to call office regarding chest x ray results          ----- Message from MAYUR Crandall sent at 8/5/2019  8:53 AM EDT -----  Normal CXR report

## 2019-08-12 ENCOUNTER — OFFICE VISIT CONVERTED (OUTPATIENT)
Dept: INTERNAL MEDICINE | Facility: CLINIC | Age: 64
End: 2019-08-12
Attending: NURSE PRACTITIONER

## 2019-08-12 ENCOUNTER — CONVERSION ENCOUNTER (OUTPATIENT)
Dept: INTERNAL MEDICINE | Facility: CLINIC | Age: 64
End: 2019-08-12

## 2019-08-12 ENCOUNTER — HOSPITAL ENCOUNTER (OUTPATIENT)
Dept: OTHER | Facility: HOSPITAL | Age: 64
Discharge: HOME | End: 2019-08-12
Attending: NURSE PRACTITIONER

## 2019-08-12 LAB
ALBUMIN SERPL-MCNC: 4.1 G/DL (ref 3.5–5)
ALBUMIN/GLOB SERPL: 1.1 {RATIO} (ref 1.4–2.6)
ALP SERPL-CCNC: 85 U/L (ref 43–160)
ALT SERPL-CCNC: 20 U/L (ref 10–40)
ANION GAP SERPL CALC-SCNC: 18 MMOL/L (ref 8–19)
AST SERPL-CCNC: 22 U/L (ref 15–50)
BASOPHILS # BLD AUTO: 0.06 10*3/UL (ref 0–0.2)
BASOPHILS NFR BLD AUTO: 0.5 % (ref 0–3)
BILIRUB SERPL-MCNC: 0.31 MG/DL (ref 0.2–1.3)
BUN SERPL-MCNC: 52 MG/DL (ref 5–25)
BUN/CREAT SERPL: 19 {RATIO} (ref 6–20)
CALCIUM SERPL-MCNC: 9.6 MG/DL (ref 8.7–10.4)
CHLORIDE SERPL-SCNC: 103 MMOL/L (ref 99–111)
CONV ABS IMM GRAN: 0.06 10*3/UL (ref 0–0.2)
CONV CO2: 24 MMOL/L (ref 22–32)
CONV IMMATURE GRAN: 0.5 % (ref 0–1.8)
CONV TOTAL PROTEIN: 7.7 G/DL (ref 6.3–8.2)
CREAT UR-MCNC: 2.8 MG/DL (ref 0.5–0.9)
DEPRECATED RDW RBC AUTO: 49.1 FL (ref 36.4–46.3)
EOSINOPHIL # BLD AUTO: 0.19 10*3/UL (ref 0–0.7)
EOSINOPHIL # BLD AUTO: 1.5 % (ref 0–7)
ERYTHROCYTE [DISTWIDTH] IN BLOOD BY AUTOMATED COUNT: 14.5 % (ref 11.7–14.4)
FOLATE SERPL-MCNC: >20 NG/ML (ref 4.8–20)
GFR SERPLBLD BASED ON 1.73 SQ M-ARVRAT: 17 ML/MIN/{1.73_M2}
GLOBULIN UR ELPH-MCNC: 3.6 G/DL (ref 2–3.5)
GLUCOSE SERPL-MCNC: 95 MG/DL (ref 65–99)
HCT VFR BLD AUTO: 36.4 % (ref 37–47)
HGB BLD-MCNC: 11.7 G/DL (ref 12–16)
LYMPHOCYTES # BLD AUTO: 3.87 10*3/UL (ref 1–5)
LYMPHOCYTES NFR BLD AUTO: 30.4 % (ref 20–45)
MCH RBC QN AUTO: 29.7 PG (ref 27–31)
MCHC RBC AUTO-ENTMCNC: 32.1 G/DL (ref 33–37)
MCV RBC AUTO: 92.4 FL (ref 81–99)
MONOCYTES # BLD AUTO: 1.03 10*3/UL (ref 0.2–1.2)
MONOCYTES NFR BLD AUTO: 8.1 % (ref 3–10)
NEUTROPHILS # BLD AUTO: 7.51 10*3/UL (ref 2–8)
NEUTROPHILS NFR BLD AUTO: 59 % (ref 30–85)
NRBC CBCN: 0 % (ref 0–0.7)
OSMOLALITY SERPL CALC.SUM OF ELEC: 306 MOSM/KG (ref 273–304)
PLATELET # BLD AUTO: 215 10*3/UL (ref 130–400)
PMV BLD AUTO: 12.8 FL (ref 9.4–12.3)
POTASSIUM SERPL-SCNC: 4.2 MMOL/L (ref 3.5–5.3)
RBC # BLD AUTO: 3.94 10*6/UL (ref 4.2–5.4)
SODIUM SERPL-SCNC: 141 MMOL/L (ref 135–147)
T4 FREE SERPL-MCNC: 1 NG/DL (ref 0.9–1.8)
TSH SERPL-ACNC: 2.11 M[IU]/L (ref 0.27–4.2)
VIT B12 SERPL-MCNC: 917 PG/ML (ref 211–911)
WBC # BLD AUTO: 12.72 10*3/UL (ref 4.8–10.8)

## 2019-08-13 LAB
FERRITIN SERPL-MCNC: 505 NG/ML (ref 10–200)
IRON SATN MFR SERPL: 34 % (ref 20–55)
IRON SERPL-MCNC: 94 UG/DL (ref 60–170)
TIBC SERPL-MCNC: 276 UG/DL (ref 245–450)
TRANSFERRIN SERPL-MCNC: 193 MG/DL (ref 250–380)

## 2019-08-15 LAB
CMV IGG CSF IA-ACNC: 2.2 U/ML
CMV IGM SERPL IA-ACNC: <8 AU/ML
CONV EBV EARLY ANTIGEN: <5 U/ML (ref 0–10.9)
CONV EBV NUCLEAR ANTIGEN: 566 U/ML (ref 0–21.9)
CONV EPSTEIN BARR VIRAL CAPSID IGM: <10 U/ML (ref 0–43.9)
CONV EPSTEIN BARR VIRUS ANTIBODY TO VIRAL CAPSID IGG: 447 U/ML (ref 0–21.9)

## 2019-08-29 ENCOUNTER — HOSPITAL ENCOUNTER (OUTPATIENT)
Dept: OTHER | Facility: HOSPITAL | Age: 64
Discharge: HOME | End: 2019-08-29
Attending: INTERNAL MEDICINE

## 2019-08-29 LAB
25(OH)D3 SERPL-MCNC: 74.3 NG/ML (ref 30–100)
ANION GAP SERPL CALC-SCNC: 19 MMOL/L (ref 8–19)
APPEARANCE UR: ABNORMAL
BASOPHILS # BLD AUTO: 0.04 10*3/UL (ref 0–0.2)
BASOPHILS NFR BLD AUTO: 0.4 % (ref 0–3)
BILIRUB UR QL: NEGATIVE
BUN SERPL-MCNC: 48 MG/DL (ref 5–25)
BUN/CREAT SERPL: 17 {RATIO} (ref 6–20)
CALCIUM SERPL-MCNC: 9.5 MG/DL (ref 8.7–10.4)
CHLORIDE SERPL-SCNC: 101 MMOL/L (ref 99–111)
COLOR UR: YELLOW
CONV ABS IMM GRAN: 0.02 10*3/UL (ref 0–0.2)
CONV BACTERIA: ABNORMAL
CONV CO2: 25 MMOL/L (ref 22–32)
CONV COLLECTION SOURCE (UA): ABNORMAL
CONV IMMATURE GRAN: 0.2 % (ref 0–1.8)
CONV UROBILINOGEN IN URINE BY AUTOMATED TEST STRIP: 0.2 {EHRLICHU}/DL (ref 0.1–1)
CREAT UR-MCNC: 2.78 MG/DL (ref 0.5–0.9)
DEPRECATED RDW RBC AUTO: 48.2 FL (ref 36.4–46.3)
EOSINOPHIL # BLD AUTO: 0.39 10*3/UL (ref 0–0.7)
EOSINOPHIL # BLD AUTO: 3.6 % (ref 0–7)
ERYTHROCYTE [DISTWIDTH] IN BLOOD BY AUTOMATED COUNT: 14.4 % (ref 11.7–14.4)
FERRITIN SERPL-MCNC: 473 NG/ML (ref 10–200)
GFR SERPLBLD BASED ON 1.73 SQ M-ARVRAT: 17 ML/MIN/{1.73_M2}
GLUCOSE SERPL-MCNC: 101 MG/DL (ref 65–99)
GLUCOSE UR QL: NEGATIVE MG/DL
HCT VFR BLD AUTO: 37.4 % (ref 37–47)
HGB BLD-MCNC: 12.1 G/DL (ref 12–16)
HGB UR QL STRIP: ABNORMAL
IRON SATN MFR SERPL: 20 % (ref 20–55)
IRON SERPL-MCNC: 56 UG/DL (ref 60–170)
KETONES UR QL STRIP: NEGATIVE MG/DL
LEUKOCYTE ESTERASE UR QL STRIP: ABNORMAL
LYMPHOCYTES # BLD AUTO: 2.67 10*3/UL (ref 1–5)
LYMPHOCYTES NFR BLD AUTO: 24.7 % (ref 20–45)
MCH RBC QN AUTO: 29.6 PG (ref 27–31)
MCHC RBC AUTO-ENTMCNC: 32.4 G/DL (ref 33–37)
MCV RBC AUTO: 91.4 FL (ref 81–99)
MONOCYTES # BLD AUTO: 0.71 10*3/UL (ref 0.2–1.2)
MONOCYTES NFR BLD AUTO: 6.6 % (ref 3–10)
NEUTROPHILS # BLD AUTO: 7 10*3/UL (ref 2–8)
NEUTROPHILS NFR BLD AUTO: 64.5 % (ref 30–85)
NITRITE UR QL STRIP: NEGATIVE
NRBC CBCN: 0 % (ref 0–0.7)
OSMOLALITY SERPL CALC.SUM OF ELEC: 305 MOSM/KG (ref 273–304)
PH UR STRIP.AUTO: 5.5 [PH] (ref 5–8)
PHOSPHATE SERPL-MCNC: 3 MG/DL (ref 2.4–4.5)
PLATELET # BLD AUTO: 183 10*3/UL (ref 130–400)
PMV BLD AUTO: 12.2 FL (ref 9.4–12.3)
POTASSIUM SERPL-SCNC: 3.8 MMOL/L (ref 3.5–5.3)
PROT UR QL: NEGATIVE MG/DL
PTH-INTACT SERPL-MCNC: 110.2 PG/ML (ref 11.1–79.5)
RBC # BLD AUTO: 4.09 10*6/UL (ref 4.2–5.4)
RBC #/AREA URNS HPF: ABNORMAL /[HPF]
SODIUM SERPL-SCNC: 141 MMOL/L (ref 135–147)
SP GR UR: 1.01 (ref 1–1.03)
SQUAMOUS SPT QL MICRO: ABNORMAL /[HPF]
TIBC SERPL-MCNC: 276 UG/DL (ref 245–450)
TRANSFERRIN SERPL-MCNC: 193 MG/DL (ref 250–380)
WBC # BLD AUTO: 10.83 10*3/UL (ref 4.8–10.8)
WBC #/AREA URNS HPF: ABNORMAL /[HPF]

## 2019-09-13 ENCOUNTER — OFFICE VISIT CONVERTED (OUTPATIENT)
Dept: INTERNAL MEDICINE | Facility: CLINIC | Age: 64
End: 2019-09-13
Attending: NURSE PRACTITIONER

## 2019-09-25 ENCOUNTER — HOSPITAL ENCOUNTER (OUTPATIENT)
Dept: OTHER | Facility: HOSPITAL | Age: 64
Discharge: HOME | End: 2019-09-25
Attending: NURSE PRACTITIONER

## 2019-09-25 ENCOUNTER — OFFICE VISIT CONVERTED (OUTPATIENT)
Dept: INTERNAL MEDICINE | Facility: CLINIC | Age: 64
End: 2019-09-25
Attending: NURSE PRACTITIONER

## 2019-09-25 ENCOUNTER — CONVERSION ENCOUNTER (OUTPATIENT)
Dept: INTERNAL MEDICINE | Facility: CLINIC | Age: 64
End: 2019-09-25

## 2019-09-25 LAB
C TRACH RRNA CVX QL NAA+PROBE: NOT DETECTED
N GONORRHOEA DNA SPEC QL NAA+PROBE: NOT DETECTED

## 2019-10-01 LAB
CONV LAST MENSTURAL PERIOD: NORMAL
SPECIMEN SOURCE: NORMAL
SPECIMEN SOURCE: NORMAL
THIN PREP CVX: NORMAL

## 2019-10-03 ENCOUNTER — PREP FOR SURGERY (OUTPATIENT)
Dept: OTHER | Facility: HOSPITAL | Age: 64
End: 2019-10-03

## 2019-10-03 DIAGNOSIS — K44.9 HIATAL HERNIA: ICD-10-CM

## 2019-10-03 DIAGNOSIS — E66.01 OBESITY, CLASS III, BMI 40-49.9 (MORBID OBESITY) (HCC): Primary | ICD-10-CM

## 2019-10-03 RX ORDER — CEFAZOLIN SODIUM IN 0.9 % NACL 3 G/100 ML
3 INTRAVENOUS SOLUTION, PIGGYBACK (ML) INTRAVENOUS
Status: CANCELLED | OUTPATIENT
Start: 2019-10-30

## 2019-10-03 RX ORDER — PANTOPRAZOLE SODIUM 40 MG/10ML
40 INJECTION, POWDER, LYOPHILIZED, FOR SOLUTION INTRAVENOUS ONCE
Status: CANCELLED | OUTPATIENT
Start: 2019-10-30 | End: 2019-10-03

## 2019-10-03 RX ORDER — SODIUM CHLORIDE 0.9 % (FLUSH) 0.9 %
3-10 SYRINGE (ML) INJECTION AS NEEDED
Status: CANCELLED | OUTPATIENT
Start: 2019-10-30

## 2019-10-03 RX ORDER — CHLORHEXIDINE GLUCONATE 0.12 MG/ML
15 RINSE ORAL SEE ADMIN INSTRUCTIONS
Status: CANCELLED | OUTPATIENT
Start: 2019-10-30

## 2019-10-03 RX ORDER — METOCLOPRAMIDE HYDROCHLORIDE 5 MG/ML
10 INJECTION INTRAMUSCULAR; INTRAVENOUS ONCE
Status: CANCELLED | OUTPATIENT
Start: 2019-10-30 | End: 2019-10-03

## 2019-10-03 RX ORDER — SODIUM CHLORIDE, SODIUM LACTATE, POTASSIUM CHLORIDE, CALCIUM CHLORIDE 600; 310; 30; 20 MG/100ML; MG/100ML; MG/100ML; MG/100ML
100 INJECTION, SOLUTION INTRAVENOUS CONTINUOUS
Status: CANCELLED | OUTPATIENT
Start: 2019-10-30

## 2019-10-03 RX ORDER — ACETAMINOPHEN 160 MG/5ML
975 SOLUTION ORAL ONCE
Status: CANCELLED | OUTPATIENT
Start: 2019-10-30 | End: 2019-10-03

## 2019-10-03 RX ORDER — SODIUM CHLORIDE 0.9 % (FLUSH) 0.9 %
3 SYRINGE (ML) INJECTION EVERY 12 HOURS SCHEDULED
Status: CANCELLED | OUTPATIENT
Start: 2019-10-03

## 2019-10-03 RX ORDER — SCOLOPAMINE TRANSDERMAL SYSTEM 1 MG/1
1 PATCH, EXTENDED RELEASE TRANSDERMAL ONCE
Status: CANCELLED | OUTPATIENT
Start: 2019-10-30 | End: 2019-10-03

## 2019-10-07 ENCOUNTER — TELEPHONE (OUTPATIENT)
Dept: NUTRITION | Facility: HOSPITAL | Age: 64
End: 2019-10-07

## 2019-10-07 NOTE — PROGRESS NOTES
Patient called requesting supplement information for her kidney doctor. She has stage 4 CKD - recommended 60g protein and [employer] Meal Replacement Shakes; sent information on Bariatric vitamins and calcium. Advised patient to follow kidney doctor recs on fluids but that we typically say 64oz for our patients.

## 2019-10-14 ENCOUNTER — OFFICE VISIT CONVERTED (OUTPATIENT)
Dept: INTERNAL MEDICINE | Facility: CLINIC | Age: 64
End: 2019-10-14
Attending: NURSE PRACTITIONER

## 2019-10-15 PROBLEM — E66.01 OBESITY, CLASS III, BMI 40-49.9 (MORBID OBESITY): Status: ACTIVE | Noted: 2019-10-15

## 2019-10-15 PROBLEM — E66.813 OBESITY, CLASS III, BMI 40-49.9 (MORBID OBESITY): Status: ACTIVE | Noted: 2019-10-15

## 2019-10-15 PROBLEM — K44.9 HIATAL HERNIA: Status: ACTIVE | Noted: 2019-10-15

## 2019-10-17 ENCOUNTER — CONSULT (OUTPATIENT)
Dept: BARIATRICS/WEIGHT MGMT | Facility: CLINIC | Age: 64
End: 2019-10-17

## 2019-10-17 VITALS
DIASTOLIC BLOOD PRESSURE: 73 MMHG | WEIGHT: 216 LBS | BODY MASS INDEX: 42.41 KG/M2 | TEMPERATURE: 97.4 F | HEIGHT: 60 IN | HEART RATE: 74 BPM | SYSTOLIC BLOOD PRESSURE: 124 MMHG | RESPIRATION RATE: 18 BRPM

## 2019-10-17 DIAGNOSIS — N18.4 RENAL FAILURE, CHRONIC, STAGE 4 (SEVERE) (HCC): ICD-10-CM

## 2019-10-17 DIAGNOSIS — E66.01 OBESITY, CLASS III, BMI 40-49.9 (MORBID OBESITY) (HCC): Primary | ICD-10-CM

## 2019-10-17 DIAGNOSIS — K21.9 GASTROESOPHAGEAL REFLUX DISEASE WITHOUT ESOPHAGITIS: ICD-10-CM

## 2019-10-17 DIAGNOSIS — G47.33 OSA (OBSTRUCTIVE SLEEP APNEA): ICD-10-CM

## 2019-10-17 DIAGNOSIS — E55.9 VITAMIN D DEFICIENCY: ICD-10-CM

## 2019-10-17 DIAGNOSIS — R53.82 CHRONIC FATIGUE: ICD-10-CM

## 2019-10-17 DIAGNOSIS — I10 ESSENTIAL HYPERTENSION: ICD-10-CM

## 2019-10-17 DIAGNOSIS — M25.50 MULTIPLE JOINT PAIN: ICD-10-CM

## 2019-10-17 DIAGNOSIS — K44.9 HIATAL HERNIA: ICD-10-CM

## 2019-10-17 DIAGNOSIS — F41.9 ANXIETY: ICD-10-CM

## 2019-10-17 PROCEDURE — 99215 OFFICE O/P EST HI 40 MIN: CPT | Performed by: SURGERY

## 2019-10-17 RX ORDER — URSODIOL 300 MG/1
300 CAPSULE ORAL 2 TIMES DAILY
Qty: 60 CAPSULE | Refills: 5 | Status: SHIPPED | OUTPATIENT
Start: 2019-10-17 | End: 2019-11-07

## 2019-10-17 NOTE — H&P
Bariatric Consult:  Referred by Maria Elena Mullins APRN    Ro Connor is here today for consult on Follow-up (sleeve consult)      History of Present Illness:     Ro Connor is a 64 y.o. female with morbid obesity with co-morbidities including sleep apnea, hypertension, osteoarthritis, back pain, knee pain and GERD who presents for surgical consultation for the above procedure. Ro has completed the initial intake visit and has been examined by our nurse practitioner, dietician, psychologist and underwent the extensive educational teaching process under the guidance of our bariatric coordinator and myself. Ro also has seen the educational video BECKI on the surgical procedure if available. Ro attended today more educational teaching from our bariatric coordinator and myself. Ro has had an extensive medical workup including a visit with their primary care physician, EKG, chest radiograph, blood work, EGD or UGI and possibly further testing. These have been reviewed by me and discussed with the patient. Ro is now ready to proceed with surgery. Ro presently denies nausea, vomiting, fever, chills, chest pain, shortness of air, melena, hematochezia, hemetemesis, dysuria, frequency, hematuria, jaundice or abdominal pain.       Past Medical History:   Diagnosis Date   • Acid reflux    • Hyperlipidemia    • Hypertension    • Renal failure    • Sleep apnea        Encounter Diagnoses   Name Primary?   • Obesity, Class III, BMI 40-49.9 (morbid obesity) (CMS/[facility]) Yes   • Hiatal hernia    • Renal failure, chronic, stage 4 (severe) (CMS/[facility])    • Gastroesophageal reflux disease without esophagitis    • Essential hypertension    • Chronic fatigue    • SEVEN (obstructive sleep apnea)    • Multiple joint pain    • Anxiety    • Vitamin D deficiency        Past Surgical History:   Procedure Laterality Date   • COLONOSCOPY  2014   • ENDOSCOPY  2009       Patient Active Problem List   Diagnosis   • Chronic  fatigue   • Ankle swelling   • Essential hypertension   • Hyperlipidemia   • SEVEN (obstructive sleep apnea)   • GERD (gastroesophageal reflux disease)   • Heartburn   • Multiple joint pain   • Anxiety   • Renal failure, chronic, stage 4 (severe) (CMS/[facility])   • Other kyphoscoliosis and scoliosis   • Vitamin D deficiency   • Obesity, Class III, BMI 40-49.9 (morbid obesity) (CMS/[facility])   • Hiatal hernia       No Known Allergies      Current Outpatient Medications:   •  aspirin 81 MG EC tablet, Take 81 mg by mouth Daily., Disp: , Rfl:   •  carvedilol (COREG) 6.25 MG tablet, Take 6.25 mg by mouth 2 (Two) Times a Day With Meals., Disp: , Rfl:   •  escitalopram (LEXAPRO) 20 MG tablet, Take 20 mg by mouth Daily., Disp: , Rfl:   •  ferrous sulfate 325 (65 FE) MG tablet, Take 325 mg by mouth Daily With Breakfast., Disp: , Rfl:   •  HYDROcodone-acetaminophen (NORCO) 7.5-325 MG per tablet, Take 1 tablet by mouth Every 6 (Six) Hours As Needed for Moderate Pain ., Disp: , Rfl:   •  hydrOXYzine (ATARAX) 50 MG tablet, Take 50 mg by mouth 3 (Three) Times a Day As Needed for Itching., Disp: , Rfl:   •  melatonin 5 MG tablet tablet, Take 5 mg by mouth., Disp: , Rfl:   •  pantoprazole (PROTONIX) 40 MG EC tablet, Take 40 mg by mouth Daily., Disp: , Rfl:   •  Pediatric Multiple Vit-C-FA (FLINSTONES GUMMIES OMEGA-3 DHA PO), Take  by mouth., Disp: , Rfl:   •  raNITIdine (ZANTAC) 150 MG tablet, Take 150 mg by mouth 2 (Two) Times a Day., Disp: , Rfl:   •  simvastatin (ZOCOR) 20 MG tablet, Take 20 mg by mouth Every Night., Disp: , Rfl:   •  traZODone (DESYREL) 50 MG tablet, Take 50 mg by mouth Every Night., Disp: , Rfl:   •  folic acid-vit B6-vit B12 (FOLBEE) 2.5-25-1 MG tablet tablet, Take 1 tablet by mouth Daily., Disp: 40 tablet, Rfl: 0  •  ursodiol (ACTIGALL) 300 MG capsule, Take 1 capsule by mouth 2 (Two) Times a Day., Disp: 60 capsule, Rfl: 5  •  vitamin D (ERGOCALCIFEROL) 68789 units capsule capsule, Take 50,000 Units by mouth 1 (One) Time  Per Week., Disp: , Rfl:     Social History     Socioeconomic History   • Marital status:      Spouse name: Not on file   • Number of children: Not on file   • Years of education: Not on file   • Highest education level: Not on file   Tobacco Use   • Smoking status: Never Smoker   • Smokeless tobacco: Never Used   Substance and Sexual Activity   • Alcohol use: Yes     Comment: occ   • Drug use: No       Family History   Problem Relation Age of Onset   • Obesity Mother    • Hypertension Mother    • Obesity Sister    • Hypertension Sister    • Stroke Sister    • Obesity Brother    • Hypertension Brother    • Heart attack Brother    • Obesity Maternal Grandmother    • Hypertension Maternal Grandmother    • Cancer Maternal Grandmother         BRAIN   • Cancer Maternal Grandfather    • Hypertension Paternal Grandmother    • Hypertension Brother    • Hypertension Sister    • Stroke Sister        Review of Systems:  Review of Systems   Constitutional: Positive for fatigue.   Musculoskeletal: Positive for arthralgias and back pain.   All other systems reviewed and are negative.        Physical Exam:    Vital Signs:  Weight: 98 kg (216 lb)   Body mass index is 42.18 kg/m².  Temp: 97.4 °F (36.3 °C)   Heart Rate: 74   BP: 124/73       Physical Exam   Constitutional: She is oriented to person, place, and time. She appears well-nourished.   HENT:   Head: Normocephalic and atraumatic.   Mouth/Throat: Oropharynx is clear and moist.   Eyes: Conjunctivae and EOM are normal. Pupils are equal, round, and reactive to light. No scleral icterus.   Neck: Normal range of motion. Neck supple. No thyromegaly present.   Cardiovascular: Normal rate and regular rhythm.   Pulmonary/Chest: Effort normal and breath sounds normal.   Abdominal: Soft. Bowel sounds are normal. She exhibits no distension and no mass. There is no tenderness. There is no rebound and no guarding. No hernia.   Musculoskeletal: Normal range of motion.    Lymphadenopathy:     She has no cervical adenopathy.   Neurological: She is alert and oriented to person, place, and time. No cranial nerve deficit. Coordination normal.   Skin: Skin is warm and dry. No erythema.   Psychiatric: She has a normal mood and affect. Her behavior is normal.   Vitals reviewed.        Assessment:    Ro Connor is a 64 y.o. year old female with medically complicated severe obesity with a BMI of Body mass index is 42.18 kg/m². and multiple co-morbidities listed in the encounter diagnosis.    I think she is an appropriate candidate for this surgery, and is ready to proceed.      Plan/Discussion/Summary:  Small hiatal hernia per outside EGD.  Patient does take PPI.  H. pylori negative    The patient has returned to the office for a surgical consultation and has requested to proceed with a laparoscopic gastric sleeve.  I have had the opportunity to obtain a history, examine the patient and review the patient's chart.    The patient understands that surgery is a tool and that weight loss is not guaranteed but only seen in the context of appropriate use, regular follow up, exercise and making appropriate food choices.     I personally discussed the potential complications of the laparoscopic gastric sleeve with this patient.  The patient is well aware of potential complications of the surgery that include but not limited to bleeding, infections, deep vein thrombosis, pulmonary embolism, pulmonary complications such as pneumonia, cardiac event, hernias, small bowel obstruction, damage to the spleen or other organs, bowel injury, disfiguring scars, failure to lose weight, need for additional surgery, conversion to an open procedure and death.  The patient is also aware of complications which apply in particular to the gastric sleeve and can include but not limited to the leakage of gastric contents at the staple line, the development of an intra-abdominal abscess, gastroesophageal reflux  disease, Joseph's esophagus, ulcers, vitamin/mineral deficiencies, strictures, and the possibility of converting this procedure to a Hedy-en-Y gastric bypass. The patient also understands the possibility of requiring an acid reducer medication for the rest of their life.    The risks, benefits, potential complications and alternative therapies were discussed at great length as outlined in our extensive consent forms, online consent and educational teaching processes.    The patient has confirmed the participation in the programs extensive educational activities.    All questions and concerns were answered to patient's satisfaction.  The patient now wishes to proceed with surgery.    Patient has declined the pre-operative insertion of an IVC filter.     The patient has declined a postoperative course of anitcoagulant therapy.      I instructed patient to start on a H2 blocker or proton pump inhibitor if not already on one of these medications.    I explained in detail the procedures that we perform.  All of these procedures have a chance to convert to open if any technical challenges or complications do occur.  Bariatric surgery is not cosmetic surgery but rather a tool to help a patient make a life-long commitment lifestyle change including diet, exercise, behavior changes, and taking supplemental vitamins and minerals.    Problems after surgery may require more operations to correct them.    The risks, benefits, alternatives, and potential complications of all of the procedures were explained in detail including, but not limited to death, anesthesia and medication adverse effect, deep venous thrombosis, pulmonary embolism, trocar site/incisional hernia, wound infection, abdominal infection, bleeding, failure to lose weight, gain weight, a change in body image, metabolic complications with vitamin deficiences and anemia.    Weight loss expectations were discussed with the patient in detail. The weight loss  operations most commonly performed are the sleeve gastrectomy and the Hedy-en-Y gastric bypass. These operations result in weight losses up to approximately 25-35% of initial body weight 12 to 24 months after surgery with the gastric bypass usually the higher percent of weight loss but depends on patient using the tool.    For the gastric bypass and loop duodenal switch (ZURDO-S) the risks include but not limited to the following early complications:  Anastomotic leak/peritonitis, Hedy/Alimentary/biliopancreatic limb obstruction, severe & minor wound infection/seroma, and nausea/vomiting.  Late complications can include but are not limited to malnutrition, vitamin deficiencies, frequent loose stools,  stomal stenosis, marginal ulcer, bowel obstruction, intussusception, internal, and incisional hernia.    Regarding the gastric sleeve, there is less long-term outcome data and higher risk of dysphagia and reflux compared to a gastric bypass, as well as risk of internal visceral/organ injury, splenectomy, bleeding, infection, leak (which could require further intervention possible conversion to Hedy-en-Y gastric bypass), stenosis and possibility of regaining weight.    Ro was counseled regarding diagnostic results, instructions for management, risk factor reductions, prognosis, patient and family education, impressions, risks and benefits of treatment options and importance of compliance with treatment. Total face to face time of the encounter was over 45 minutes and over 30 minutes was spent counseling.     Mumtaz Longoria   As part of this patient's treatment plan I am prescribing controlled substances. The patient has been made aware of appropriate use of such medications, including potential risk of somnolence, limited ability to drive and /or work safely, and potential for dependence or overdose. It has also been made clear that these medications are for use by this patient only, without concomitant use of alcohol  or other substances unless prescribed.    Ro has completed prescribing agreement detailing terms of continued prescribing of controlled substances, including monitoring REED reports, urine drug screening, and pill counts if necessary. Ro is aware that inappropriate use will result in cessation of prescribing such medications.    REED report has been reviewed      History and physical exam exhibit continued safe and appropriate use of controlled substances.      Ro understands the surgical procedures and the different surgical options that are available.  She understands the lifestyle changes that are required after surgery and has agreed to follow the guidelines outlined in the weight management program.  She also expressed understanding of the risks involved and had all of female questions answered and desires to proceed.      Dane Delgado MD  10/17/2019

## 2019-10-17 NOTE — PATIENT INSTRUCTIONS
Bariatric Manual    You were provided a manual specific to the procedure that you have chosen.  Please refer to that with any questions or call the office at [phone]

## 2019-10-22 ENCOUNTER — APPOINTMENT (OUTPATIENT)
Dept: PREADMISSION TESTING | Facility: HOSPITAL | Age: 64
End: 2019-10-22

## 2019-10-22 VITALS
SYSTOLIC BLOOD PRESSURE: 127 MMHG | HEIGHT: 60 IN | RESPIRATION RATE: 16 BRPM | OXYGEN SATURATION: 95 % | HEART RATE: 74 BPM | DIASTOLIC BLOOD PRESSURE: 85 MMHG | BODY MASS INDEX: 42.18 KG/M2 | TEMPERATURE: 98.1 F

## 2019-10-22 DIAGNOSIS — E66.01 OBESITY, CLASS III, BMI 40-49.9 (MORBID OBESITY) (HCC): ICD-10-CM

## 2019-10-22 DIAGNOSIS — K44.9 HIATAL HERNIA: ICD-10-CM

## 2019-10-22 LAB
ALBUMIN SERPL-MCNC: 4 G/DL (ref 3.5–5.2)
ALBUMIN/GLOB SERPL: 1 G/DL
ALP SERPL-CCNC: 93 U/L (ref 39–117)
ALT SERPL W P-5'-P-CCNC: 19 U/L (ref 1–33)
ANION GAP SERPL CALCULATED.3IONS-SCNC: 15.2 MMOL/L (ref 5–15)
AST SERPL-CCNC: 25 U/L (ref 1–32)
BILIRUB SERPL-MCNC: 0.3 MG/DL (ref 0.2–1.2)
BUN BLD-MCNC: 60 MG/DL (ref 8–23)
BUN/CREAT SERPL: 22.1 (ref 7–25)
CALCIUM SPEC-SCNC: 9.5 MG/DL (ref 8.6–10.5)
CHLORIDE SERPL-SCNC: 97 MMOL/L (ref 98–107)
CO2 SERPL-SCNC: 25.8 MMOL/L (ref 22–29)
CREAT BLD-MCNC: 2.71 MG/DL (ref 0.57–1)
DEPRECATED RDW RBC AUTO: 43.8 FL (ref 37–54)
ERYTHROCYTE [DISTWIDTH] IN BLOOD BY AUTOMATED COUNT: 13.5 % (ref 12.3–15.4)
GFR SERPL CREATININE-BSD FRML MDRD: 18 ML/MIN/1.73
GLOBULIN UR ELPH-MCNC: 3.9 GM/DL
GLUCOSE BLD-MCNC: 98 MG/DL (ref 65–99)
HCT VFR BLD AUTO: 34.7 % (ref 34–46.6)
HGB BLD-MCNC: 11.3 G/DL (ref 12–15.9)
MCH RBC QN AUTO: 28.8 PG (ref 26.6–33)
MCHC RBC AUTO-ENTMCNC: 32.6 G/DL (ref 31.5–35.7)
MCV RBC AUTO: 88.3 FL (ref 79–97)
PLATELET # BLD AUTO: 234 10*3/MM3 (ref 140–450)
PMV BLD AUTO: 12 FL (ref 6–12)
POTASSIUM BLD-SCNC: 3.2 MMOL/L (ref 3.5–5.2)
PROT SERPL-MCNC: 7.9 G/DL (ref 6–8.5)
RBC # BLD AUTO: 3.93 10*6/MM3 (ref 3.77–5.28)
SODIUM BLD-SCNC: 138 MMOL/L (ref 136–145)
WBC NRBC COR # BLD: 12.13 10*3/MM3 (ref 3.4–10.8)

## 2019-10-22 PROCEDURE — 85027 COMPLETE CBC AUTOMATED: CPT | Performed by: SURGERY

## 2019-10-22 PROCEDURE — 93010 ELECTROCARDIOGRAM REPORT: CPT | Performed by: INTERNAL MEDICINE

## 2019-10-22 PROCEDURE — 80053 COMPREHEN METABOLIC PANEL: CPT | Performed by: SURGERY

## 2019-10-22 PROCEDURE — 93005 ELECTROCARDIOGRAM TRACING: CPT

## 2019-10-22 PROCEDURE — 36415 COLL VENOUS BLD VENIPUNCTURE: CPT

## 2019-10-22 RX ORDER — FUROSEMIDE 20 MG/1
20 TABLET ORAL DAILY
COMMUNITY
End: 2021-08-23

## 2019-10-22 NOTE — DISCHARGE INSTRUCTIONS
Take only the following medications the morning of surgery with a small sip of water:     CARVEDILOL-(COREG)    Do not take Bariatric Vitamins, Folic Acid, Actigall (if applicable) or Lovenox Injections (if applicable) the morning of surgery.  If you have a history of blood clots or have a BMI greater than 50, Dr. Delgado may order Lovenox for after surgery. Do not take Lovenox blood thinner before surgery.      General Instructions:   • Do not eat solid food after midnight the night before surgery.    • After midnight, you may have up to 20 oz of clear-artificially sweetened liquid (to include Gatorade Zero, Powerade Zero, Water, Tea/Coffee with no cream, milk or sugar).  Nothing red in color.  Any drinks must be completed 2 hours before your arrival time @ 0400 AM STOP DRINKING!!!!. Patients who avoid smoking, chewing tobacco and alcohol for 4 weeks prior to surgery have a reduced risk of post-operative complications.  Quit smoking as many days before surgery as you can.  • Do not smoke, use chewing tobacco or drink alcohol the day of surgery.   • Bring any papers given to you in the doctor’s office.  Wear clean comfortable clothes.  • Do not wear contact lenses, false eyelashes or make-up.  Bring a case for your glasses.   • Bring crutches or walker if applicable.  • Remove all piercings.  Leave jewelry and any other valuables at home.  • Remove fingernail polish, gel overlays or any artificial nails.  • Hair extensions with metal clips must be removed prior to surgery.  • The Pre-Admission Testing nurse will instruct you to bring medications if unable to obtain an accurate list in Pre-Admission Testing.      Preventing a Surgical Site Infection:  • For 2 to 3 days before surgery, avoid shaving with a razor because the razor can irritate skin and make it easier to develop an infection.    • Any areas of open skin can increase the risk of a post-operative wound infection by allowing bacteria to enter and travel  throughout the body.  Notify your surgeon if you have any skin wounds / rashes even if it is not near the expected surgical site.  The area will need assessed to determine if surgery should be delayed until it is healed.  • 2 days prior to surgery, take a shower using a fresh bar of anti-bacterial soap (such as Dial).  Use a clean washcloth and dry with a clean towel.    • The day prior to surgery, take a shower using a fresh bar of anti-bacterial soap (such as Dial).  Use a clean washcloth and dry with a clean towel.  After the shower, utilize a package of cloths given to you in PAT.  Sleep in a clean bed with clean clothing.  Do not allow pets to sleep with you.  • The morning of surgery shower using a fresh bar of anti-bacterial soap (such as Dial).  Use a clean washcloth and dry with a clean towel.  Then utilize the other package of the cloths given to you in PAT.  Dress in clean clothing.  • Do not use any cologne, deodorant, lotion or powder morning of surgery.   Ask your surgeon if you will be receiving antibiotics prior to surgery.  • Make sure you, your family, and all healthcare providers clean their hands with soap and water or an alcohol based hand  before caring for you or your wound.      Day of surgery:10- ARRIVE @ 0600 AM REPORT TO THE [facility]   Your arrival time is approximately two hours before your scheduled surgery time.  Upon arrival, a Pre-op nurse and Anesthesiologist will review your health history, obtain vital signs, and answer questions you may have.  A Pre-op nurse will start an IV and you may receive medication in preparation for surgery, including something to help you relax.  Your family will be able to see you in the Pre-op area.  Two visitors at a time will be allowed in the Pre-op room.  While you are in surgery, your family should notify the waiting room  if they leave the waiting room area and provide a contact phone number.  If you are staying overnight  your family can leave your belongings in the car and bring them to your room later. We do ask that you have your C-PAP machine available. It can be utilized the night of surgery.     Please be aware that surgery does come with discomfort.  We want to make every effort to control your discomfort so please discuss any uncontrolled symptoms with your nurse.   Your doctor will most likely have prescribed pain medications.      If you are going home after surgery you will receive individualized written care instructions before being discharged.  A responsible adult must drive you to and from the hospital on the day of your surgery and stay with you for 24 hours.    If you are staying overnight following surgery, you will be transported to your hospital room following the recovery period.  [facility] has all private rooms.    You have received a list of surgical assistants for your reference.   If you have any questions please call Pre-Admission Testing at [phone].  Deductibles and co-payments are collected on the day of service. Please be prepared to pay the required co-pay, deductible or deposit on the day of service as defined by your plan.    2% CHLORAHEXIDINE GLUCONATE* CLOTH  Preparing or “prepping” skin before surgery can reduce the risk of infection at the surgical site. To make the process easier, [facility] has chosen disposable cloths moistened with a rinse-free, 2% Chlorhexidine Gluconate (CHG) antiseptic solution. The steps below outline the prepping process and should be carefully followed.        Use the prep cloth on the area that is circled in the diagram             Directions Night before Surgery 10- PM PRIOR TO SURGERY   1) Shower using a fresh bar of anti-bacterial soap (such as Dial) and clean washcloth.  Use a clean towel to completely dry your skin.  2) Do not use any lotions, oils or creams on your skin.  3) Open the package and remove 1 cloth, wipe your skin  for 30 seconds in a circular motion.  Allow to dry for 3 minutes.  4) Repeat #3 with second cloth.  5) Do not touch your eyes, ears, or mouth with the prep cloth.  6) Allow the wet prep solution to air dry.  7) Discard the prep cloth and wash your hands with soap and water.   8) Dress in clean bed clothes and sleep on fresh clean bed sheets.   9) You may experience some temporary itching after the prep.    Directions Day of Surgery 10- AM PRIOR TO SURGERY  1) Repeat steps 1,2,3,4,5,6,7, and 9.   2) Dress in clean clothes before coming to the hospital.

## 2019-10-24 ENCOUNTER — TELEPHONE (OUTPATIENT)
Dept: BARIATRICS/WEIGHT MGMT | Facility: CLINIC | Age: 64
End: 2019-10-24

## 2019-10-24 NOTE — TELEPHONE ENCOUNTER
----- Message from Dane Delgado Jr., MD sent at 10/23/2019  3:55 PM EDT -----  I left patient a voicemail to go over her lab results.  I instructed her to call back.  I did inform her that her potassium was a little bit low.

## 2019-10-28 ENCOUNTER — HOSPITAL ENCOUNTER (OUTPATIENT)
Dept: OTHER | Facility: HOSPITAL | Age: 64
Discharge: HOME | End: 2019-10-28
Attending: INTERNAL MEDICINE

## 2019-10-28 LAB
25(OH)D3 SERPL-MCNC: 68.2 NG/ML (ref 30–100)
ANION GAP SERPL CALC-SCNC: 20 MMOL/L (ref 8–19)
APPEARANCE UR: CLEAR
BASOPHILS # BLD AUTO: 0.08 10*3/UL (ref 0–0.2)
BASOPHILS NFR BLD AUTO: 0.6 % (ref 0–3)
BILIRUB UR QL: NEGATIVE
BUN SERPL-MCNC: 61 MG/DL (ref 5–25)
BUN/CREAT SERPL: 21 {RATIO} (ref 6–20)
CALCIUM SERPL-MCNC: 9.9 MG/DL (ref 8.7–10.4)
CHLORIDE SERPL-SCNC: 97 MMOL/L (ref 99–111)
COLOR UR: YELLOW
CONV ABS IMM GRAN: 0.05 10*3/UL (ref 0–0.2)
CONV BACTERIA: NEGATIVE
CONV CO2: 24 MMOL/L (ref 22–32)
CONV COLLECTION SOURCE (UA): ABNORMAL
CONV IMMATURE GRAN: 0.4 % (ref 0–1.8)
CONV UROBILINOGEN IN URINE BY AUTOMATED TEST STRIP: 0.2 {EHRLICHU}/DL (ref 0.1–1)
CREAT UR-MCNC: 2.92 MG/DL (ref 0.5–0.9)
DEPRECATED RDW RBC AUTO: 47.5 FL (ref 36.4–46.3)
EOSINOPHIL # BLD AUTO: 0.42 10*3/UL (ref 0–0.7)
EOSINOPHIL # BLD AUTO: 3.2 % (ref 0–7)
ERYTHROCYTE [DISTWIDTH] IN BLOOD BY AUTOMATED COUNT: 14.6 % (ref 11.7–14.4)
GFR SERPLBLD BASED ON 1.73 SQ M-ARVRAT: 16 ML/MIN/{1.73_M2}
GLUCOSE SERPL-MCNC: 101 MG/DL (ref 65–99)
GLUCOSE UR QL: NEGATIVE MG/DL
HCT VFR BLD AUTO: 34.8 % (ref 37–47)
HGB BLD-MCNC: 11.3 G/DL (ref 12–16)
HGB UR QL STRIP: NEGATIVE
KETONES UR QL STRIP: NEGATIVE MG/DL
LEUKOCYTE ESTERASE UR QL STRIP: ABNORMAL
LYMPHOCYTES # BLD AUTO: 3.78 10*3/UL (ref 1–5)
LYMPHOCYTES NFR BLD AUTO: 29 % (ref 20–45)
MCH RBC QN AUTO: 29.1 PG (ref 27–31)
MCHC RBC AUTO-ENTMCNC: 32.5 G/DL (ref 33–37)
MCV RBC AUTO: 89.7 FL (ref 81–99)
MONOCYTES # BLD AUTO: 0.91 10*3/UL (ref 0.2–1.2)
MONOCYTES NFR BLD AUTO: 7 % (ref 3–10)
NEUTROPHILS # BLD AUTO: 7.8 10*3/UL (ref 2–8)
NEUTROPHILS NFR BLD AUTO: 59.8 % (ref 30–85)
NITRITE UR QL STRIP: NEGATIVE
NRBC CBCN: 0 % (ref 0–0.7)
OSMOLALITY SERPL CALC.SUM OF ELEC: 303 MOSM/KG (ref 273–304)
PH UR STRIP.AUTO: 6.5 [PH] (ref 5–8)
PLATELET # BLD AUTO: 219 10*3/UL (ref 130–400)
PMV BLD AUTO: 12.4 FL (ref 9.4–12.3)
POTASSIUM SERPL-SCNC: 3.3 MMOL/L (ref 3.5–5.3)
PROT UR QL: NEGATIVE MG/DL
RBC # BLD AUTO: 3.88 10*6/UL (ref 4.2–5.4)
RBC #/AREA URNS HPF: ABNORMAL /[HPF]
SODIUM SERPL-SCNC: 138 MMOL/L (ref 135–147)
SP GR UR: 1.01 (ref 1–1.03)
SQUAMOUS SPT QL MICRO: ABNORMAL /[HPF]
WBC # BLD AUTO: 13.04 10*3/UL (ref 4.8–10.8)
WBC #/AREA URNS HPF: ABNORMAL /[HPF]

## 2019-10-30 ENCOUNTER — ANESTHESIA (OUTPATIENT)
Dept: PERIOP | Facility: HOSPITAL | Age: 64
End: 2019-10-30

## 2019-10-30 ENCOUNTER — HOSPITAL ENCOUNTER (INPATIENT)
Facility: HOSPITAL | Age: 64
LOS: 1 days | Discharge: HOME | End: 2019-10-31
Attending: SURGERY | Admitting: SURGERY

## 2019-10-30 ENCOUNTER — ANESTHESIA EVENT (OUTPATIENT)
Dept: PERIOP | Facility: HOSPITAL | Age: 64
End: 2019-10-30

## 2019-10-30 DIAGNOSIS — G47.33 OSA (OBSTRUCTIVE SLEEP APNEA): Primary | ICD-10-CM

## 2019-10-30 DIAGNOSIS — K44.9 HIATAL HERNIA: ICD-10-CM

## 2019-10-30 DIAGNOSIS — K21.9 GASTROESOPHAGEAL REFLUX DISEASE WITHOUT ESOPHAGITIS: ICD-10-CM

## 2019-10-30 DIAGNOSIS — N18.4 RENAL FAILURE, CHRONIC, STAGE 4 (SEVERE) (HCC): ICD-10-CM

## 2019-10-30 DIAGNOSIS — E66.01 OBESITY, CLASS III, BMI 40-49.9 (MORBID OBESITY) (HCC): ICD-10-CM

## 2019-10-30 PROBLEM — E66.813 CLASS 3 SEVERE OBESITY DUE TO EXCESS CALORIES WITH SERIOUS COMORBIDITY AND BODY MASS INDEX (BMI) OF 40.0 TO 44.9 IN ADULT: Status: ACTIVE | Noted: 2019-10-30

## 2019-10-30 PROBLEM — E66.813 CLASS 3 SEVERE OBESITY DUE TO EXCESS CALORIES WITH SERIOUS COMORBIDITY AND BODY MASS INDEX (BMI) OF 40.0 TO 44.9 IN ADULT: Status: RESOLVED | Noted: 2019-10-30 | Resolved: 2019-10-30

## 2019-10-30 LAB
ANION GAP SERPL CALCULATED.3IONS-SCNC: 15.5 MMOL/L (ref 5–15)
BUN BLD-MCNC: 55 MG/DL (ref 8–23)
BUN/CREAT SERPL: 20.3 (ref 7–25)
CALCIUM SPEC-SCNC: 9.1 MG/DL (ref 8.6–10.5)
CHLORIDE SERPL-SCNC: 95 MMOL/L (ref 98–107)
CO2 SERPL-SCNC: 25.5 MMOL/L (ref 22–29)
CREAT BLD-MCNC: 2.71 MG/DL (ref 0.57–1)
GFR SERPL CREATININE-BSD FRML MDRD: 18 ML/MIN/1.73
GLUCOSE BLD-MCNC: 215 MG/DL (ref 65–99)
POTASSIUM BLD-SCNC: 3 MMOL/L (ref 3.5–5.2)
SODIUM BLD-SCNC: 136 MMOL/L (ref 136–145)

## 2019-10-30 PROCEDURE — 25010000002 FENTANYL CITRATE (PF) 100 MCG/2ML SOLUTION: Performed by: NURSE ANESTHETIST, CERTIFIED REGISTERED

## 2019-10-30 PROCEDURE — 0BQT4ZZ REPAIR DIAPHRAGM, PERCUTANEOUS ENDOSCOPIC APPROACH: ICD-10-PCS | Performed by: SURGERY

## 2019-10-30 PROCEDURE — 43775 LAP SLEEVE GASTRECTOMY: CPT | Performed by: SURGERY

## 2019-10-30 PROCEDURE — 25010000002 METOCLOPRAMIDE PER 10 MG: Performed by: SURGERY

## 2019-10-30 PROCEDURE — 25010000002 NEOSTIGMINE PER 0.5 MG: Performed by: NURSE ANESTHETIST, CERTIFIED REGISTERED

## 2019-10-30 PROCEDURE — 25010000002 PHENYLEPHRINE PER 1 ML: Performed by: NURSE ANESTHETIST, CERTIFIED REGISTERED

## 2019-10-30 PROCEDURE — 25010000002 PROPOFOL 10 MG/ML EMULSION: Performed by: NURSE ANESTHETIST, CERTIFIED REGISTERED

## 2019-10-30 PROCEDURE — 0DB64Z3 EXCISION OF STOMACH, PERCUTANEOUS ENDOSCOPIC APPROACH, VERTICAL: ICD-10-PCS | Performed by: SURGERY

## 2019-10-30 PROCEDURE — 25010000002 MIDAZOLAM PER 1 MG: Performed by: ANESTHESIOLOGY

## 2019-10-30 PROCEDURE — 80048 BASIC METABOLIC PNL TOTAL CA: CPT | Performed by: SURGERY

## 2019-10-30 PROCEDURE — 25010000002 DEXAMETHASONE PER 1 MG: Performed by: NURSE ANESTHETIST, CERTIFIED REGISTERED

## 2019-10-30 PROCEDURE — 25010000002 ONDANSETRON PER 1 MG: Performed by: NURSE ANESTHETIST, CERTIFIED REGISTERED

## 2019-10-30 PROCEDURE — 25010000002 ONDANSETRON PER 1 MG: Performed by: SURGERY

## 2019-10-30 PROCEDURE — 88307 TISSUE EXAM BY PATHOLOGIST: CPT | Performed by: SURGERY

## 2019-10-30 PROCEDURE — 25010000002 ENOXAPARIN PER 10 MG: Performed by: SURGERY

## 2019-10-30 PROCEDURE — 25010000002 CEFAZOLIN PER 500 MG: Performed by: SURGERY

## 2019-10-30 DEVICE — STPL/LN REINF PERISTRIP DRY VERITAS THN: Type: IMPLANTABLE DEVICE | Status: FUNCTIONAL

## 2019-10-30 DEVICE — SEALANT WND FIBRIN TISSEEL PREFIL/SYR/PRIMAFZ 4ML: Type: IMPLANTABLE DEVICE | Status: FUNCTIONAL

## 2019-10-30 DEVICE — ENDOPATH ECHELON ENDOSCOPIC LINEAR CUTTER RELOADS, GREEN, 60MM
Type: IMPLANTABLE DEVICE | Status: FUNCTIONAL
Brand: ECHELON ENDOPATH

## 2019-10-30 RX ORDER — LIDOCAINE HYDROCHLORIDE 20 MG/ML
INJECTION, SOLUTION INFILTRATION; PERINEURAL AS NEEDED
Status: DISCONTINUED | OUTPATIENT
Start: 2019-10-30 | End: 2019-10-30 | Stop reason: SURG

## 2019-10-30 RX ORDER — ONDANSETRON 4 MG/1
4 TABLET, FILM COATED ORAL EVERY 4 HOURS PRN
Status: DISCONTINUED | OUTPATIENT
Start: 2019-10-30 | End: 2019-10-31 | Stop reason: HOSPADM

## 2019-10-30 RX ORDER — HYDROCODONE BITARTRATE AND ACETAMINOPHEN 5; 325 MG/1; MG/1
1 TABLET ORAL ONCE AS NEEDED
Status: CANCELLED | OUTPATIENT
Start: 2019-10-30

## 2019-10-30 RX ORDER — ACETAMINOPHEN 325 MG/1
650 TABLET ORAL ONCE AS NEEDED
Status: CANCELLED | OUTPATIENT
Start: 2019-10-30

## 2019-10-30 RX ORDER — SODIUM CHLORIDE 9 MG/ML
100 INJECTION, SOLUTION INTRAVENOUS CONTINUOUS
Status: DISCONTINUED | OUTPATIENT
Start: 2019-10-30 | End: 2019-10-30 | Stop reason: HOSPADM

## 2019-10-30 RX ORDER — NITROGLYCERIN 0.4 MG/1
0.4 TABLET SUBLINGUAL
Status: DISCONTINUED | OUTPATIENT
Start: 2019-10-30 | End: 2019-10-31 | Stop reason: HOSPADM

## 2019-10-30 RX ORDER — FENTANYL CITRATE 50 UG/ML
INJECTION, SOLUTION INTRAMUSCULAR; INTRAVENOUS AS NEEDED
Status: DISCONTINUED | OUTPATIENT
Start: 2019-10-30 | End: 2019-10-30 | Stop reason: SURG

## 2019-10-30 RX ORDER — CARVEDILOL 6.25 MG/1
6.25 TABLET ORAL 2 TIMES DAILY WITH MEALS
Status: DISCONTINUED | OUTPATIENT
Start: 2019-10-30 | End: 2019-10-31 | Stop reason: HOSPADM

## 2019-10-30 RX ORDER — LIDOCAINE HYDROCHLORIDE 10 MG/ML
0.5 INJECTION, SOLUTION EPIDURAL; INFILTRATION; INTRACAUDAL; PERINEURAL ONCE AS NEEDED
Status: DISCONTINUED | OUTPATIENT
Start: 2019-10-30 | End: 2019-10-30 | Stop reason: HOSPADM

## 2019-10-30 RX ORDER — ACETAMINOPHEN 650 MG/1
650 SUPPOSITORY RECTAL ONCE AS NEEDED
Status: DISCONTINUED | OUTPATIENT
Start: 2019-10-30 | End: 2019-10-30 | Stop reason: HOSPADM

## 2019-10-30 RX ORDER — NALOXONE HCL 0.4 MG/ML
0.4 VIAL (ML) INJECTION
Status: DISCONTINUED | OUTPATIENT
Start: 2019-10-30 | End: 2019-10-31 | Stop reason: HOSPADM

## 2019-10-30 RX ORDER — ACETAMINOPHEN 650 MG/1
650 SUPPOSITORY RECTAL ONCE AS NEEDED
Status: CANCELLED | OUTPATIENT
Start: 2019-10-30

## 2019-10-30 RX ORDER — ALBUTEROL SULFATE 2.5 MG/3ML
2.5 SOLUTION RESPIRATORY (INHALATION) EVERY 6 HOURS PRN
Status: DISCONTINUED | OUTPATIENT
Start: 2019-10-30 | End: 2019-10-31 | Stop reason: HOSPADM

## 2019-10-30 RX ORDER — PROPOFOL 10 MG/ML
VIAL (ML) INTRAVENOUS AS NEEDED
Status: DISCONTINUED | OUTPATIENT
Start: 2019-10-30 | End: 2019-10-30 | Stop reason: SURG

## 2019-10-30 RX ORDER — PROMETHAZINE HYDROCHLORIDE 25 MG/ML
12.5 INJECTION, SOLUTION INTRAMUSCULAR; INTRAVENOUS ONCE AS NEEDED
Status: DISCONTINUED | OUTPATIENT
Start: 2019-10-30 | End: 2019-10-30 | Stop reason: HOSPADM

## 2019-10-30 RX ORDER — GLYCOPYRROLATE 0.2 MG/ML
INJECTION INTRAMUSCULAR; INTRAVENOUS AS NEEDED
Status: DISCONTINUED | OUTPATIENT
Start: 2019-10-30 | End: 2019-10-30 | Stop reason: SURG

## 2019-10-30 RX ORDER — EPHEDRINE SULFATE 50 MG/ML
5 INJECTION, SOLUTION INTRAVENOUS ONCE AS NEEDED
Status: DISCONTINUED | OUTPATIENT
Start: 2019-10-30 | End: 2019-10-30 | Stop reason: HOSPADM

## 2019-10-30 RX ORDER — NALOXONE HCL 0.4 MG/ML
0.2 VIAL (ML) INJECTION AS NEEDED
Status: DISCONTINUED | OUTPATIENT
Start: 2019-10-30 | End: 2019-10-30 | Stop reason: HOSPADM

## 2019-10-30 RX ORDER — FUROSEMIDE 20 MG/1
20 TABLET ORAL
Status: DISCONTINUED | OUTPATIENT
Start: 2019-10-30 | End: 2019-10-31 | Stop reason: HOSPADM

## 2019-10-30 RX ORDER — ONDANSETRON 2 MG/ML
4 INJECTION INTRAMUSCULAR; INTRAVENOUS ONCE AS NEEDED
Status: DISCONTINUED | OUTPATIENT
Start: 2019-10-30 | End: 2019-10-30 | Stop reason: HOSPADM

## 2019-10-30 RX ORDER — HYDROMORPHONE HYDROCHLORIDE 1 MG/ML
0.5 INJECTION, SOLUTION INTRAMUSCULAR; INTRAVENOUS; SUBCUTANEOUS
Status: DISCONTINUED | OUTPATIENT
Start: 2019-10-30 | End: 2019-10-31 | Stop reason: HOSPADM

## 2019-10-30 RX ORDER — HYDROMORPHONE HYDROCHLORIDE 1 MG/ML
0.25 INJECTION, SOLUTION INTRAMUSCULAR; INTRAVENOUS; SUBCUTANEOUS
Status: CANCELLED | OUTPATIENT
Start: 2019-10-30 | End: 2019-10-31

## 2019-10-30 RX ORDER — ONDANSETRON 2 MG/ML
INJECTION INTRAMUSCULAR; INTRAVENOUS AS NEEDED
Status: DISCONTINUED | OUTPATIENT
Start: 2019-10-30 | End: 2019-10-30 | Stop reason: SURG

## 2019-10-30 RX ORDER — HYDROMORPHONE HYDROCHLORIDE 2 MG/1
2 TABLET ORAL EVERY 4 HOURS PRN
Status: DISCONTINUED | OUTPATIENT
Start: 2019-10-30 | End: 2019-10-31 | Stop reason: HOSPADM

## 2019-10-30 RX ORDER — POTASSIUM CHLORIDE 7.45 MG/ML
10 INJECTION INTRAVENOUS
Status: DISCONTINUED | OUTPATIENT
Start: 2019-10-30 | End: 2019-10-31 | Stop reason: HOSPADM

## 2019-10-30 RX ORDER — SODIUM CHLORIDE 0.9 % (FLUSH) 0.9 %
3-10 SYRINGE (ML) INJECTION AS NEEDED
Status: DISCONTINUED | OUTPATIENT
Start: 2019-10-30 | End: 2019-10-30 | Stop reason: HOSPADM

## 2019-10-30 RX ORDER — OXYCODONE AND ACETAMINOPHEN 7.5; 325 MG/1; MG/1
1 TABLET ORAL ONCE AS NEEDED
Status: DISCONTINUED | OUTPATIENT
Start: 2019-10-30 | End: 2019-10-30 | Stop reason: HOSPADM

## 2019-10-30 RX ORDER — DIPHENHYDRAMINE HYDROCHLORIDE 50 MG/ML
12.5 INJECTION INTRAMUSCULAR; INTRAVENOUS
Status: DISCONTINUED | OUTPATIENT
Start: 2019-10-30 | End: 2019-10-30 | Stop reason: HOSPADM

## 2019-10-30 RX ORDER — SODIUM CHLORIDE 0.9 % (FLUSH) 0.9 %
3 SYRINGE (ML) INJECTION EVERY 12 HOURS SCHEDULED
Status: DISCONTINUED | OUTPATIENT
Start: 2019-10-30 | End: 2019-10-30 | Stop reason: HOSPADM

## 2019-10-30 RX ORDER — DIPHENHYDRAMINE HYDROCHLORIDE 50 MG/ML
25 INJECTION INTRAMUSCULAR; INTRAVENOUS EVERY 4 HOURS PRN
Status: DISCONTINUED | OUTPATIENT
Start: 2019-10-30 | End: 2019-10-31 | Stop reason: HOSPADM

## 2019-10-30 RX ORDER — SODIUM CHLORIDE, SODIUM LACTATE, POTASSIUM CHLORIDE, CALCIUM CHLORIDE 600; 310; 30; 20 MG/100ML; MG/100ML; MG/100ML; MG/100ML
100 INJECTION, SOLUTION INTRAVENOUS CONTINUOUS
Status: DISCONTINUED | OUTPATIENT
Start: 2019-10-30 | End: 2019-10-30

## 2019-10-30 RX ORDER — ROCURONIUM BROMIDE 10 MG/ML
INJECTION, SOLUTION INTRAVENOUS AS NEEDED
Status: DISCONTINUED | OUTPATIENT
Start: 2019-10-30 | End: 2019-10-30 | Stop reason: SURG

## 2019-10-30 RX ORDER — ACETAMINOPHEN 500 MG
1000 TABLET ORAL EVERY 6 HOURS PRN
Status: DISCONTINUED | OUTPATIENT
Start: 2019-10-30 | End: 2019-10-31 | Stop reason: HOSPADM

## 2019-10-30 RX ORDER — CYANOCOBALAMIN 1000 UG/ML
1000 INJECTION, SOLUTION INTRAMUSCULAR; SUBCUTANEOUS ONCE
Status: COMPLETED | OUTPATIENT
Start: 2019-10-31 | End: 2019-10-31

## 2019-10-30 RX ORDER — CHLORHEXIDINE GLUCONATE 0.12 MG/ML
15 RINSE ORAL SEE ADMIN INSTRUCTIONS
Status: COMPLETED | OUTPATIENT
Start: 2019-10-30 | End: 2019-10-30

## 2019-10-30 RX ORDER — PROMETHAZINE HYDROCHLORIDE 25 MG/1
25 TABLET ORAL ONCE AS NEEDED
Status: CANCELLED | OUTPATIENT
Start: 2019-10-30

## 2019-10-30 RX ORDER — MIDAZOLAM HYDROCHLORIDE 1 MG/ML
2 INJECTION INTRAMUSCULAR; INTRAVENOUS
Status: DISCONTINUED | OUTPATIENT
Start: 2019-10-30 | End: 2019-10-30 | Stop reason: HOSPADM

## 2019-10-30 RX ORDER — HYDROCODONE BITARTRATE AND ACETAMINOPHEN 7.5; 325 MG/1; MG/1
1 TABLET ORAL ONCE AS NEEDED
Status: DISCONTINUED | OUTPATIENT
Start: 2019-10-30 | End: 2019-10-30 | Stop reason: HOSPADM

## 2019-10-30 RX ORDER — METOCLOPRAMIDE HYDROCHLORIDE 5 MG/ML
5 INJECTION INTRAMUSCULAR; INTRAVENOUS EVERY 6 HOURS
Status: DISCONTINUED | OUTPATIENT
Start: 2019-10-30 | End: 2019-10-31 | Stop reason: HOSPADM

## 2019-10-30 RX ORDER — PROMETHAZINE HYDROCHLORIDE 25 MG/1
25 TABLET ORAL ONCE AS NEEDED
Status: DISCONTINUED | OUTPATIENT
Start: 2019-10-30 | End: 2019-10-30 | Stop reason: HOSPADM

## 2019-10-30 RX ORDER — DIPHENHYDRAMINE HYDROCHLORIDE 50 MG/ML
12.5 INJECTION INTRAMUSCULAR; INTRAVENOUS
Status: CANCELLED | OUTPATIENT
Start: 2019-10-30

## 2019-10-30 RX ORDER — PROMETHAZINE HYDROCHLORIDE 25 MG/ML
6.25 INJECTION, SOLUTION INTRAMUSCULAR; INTRAVENOUS
Status: DISCONTINUED | OUTPATIENT
Start: 2019-10-30 | End: 2019-10-30 | Stop reason: HOSPADM

## 2019-10-30 RX ORDER — LABETALOL HYDROCHLORIDE 5 MG/ML
5 INJECTION, SOLUTION INTRAVENOUS
Status: DISCONTINUED | OUTPATIENT
Start: 2019-10-30 | End: 2019-10-30 | Stop reason: HOSPADM

## 2019-10-30 RX ORDER — BUPIVACAINE HYDROCHLORIDE AND EPINEPHRINE 5; 5 MG/ML; UG/ML
INJECTION, SOLUTION PERINEURAL AS NEEDED
Status: DISCONTINUED | OUTPATIENT
Start: 2019-10-30 | End: 2019-10-30 | Stop reason: HOSPADM

## 2019-10-30 RX ORDER — FAMOTIDINE 40 MG/1
40 TABLET, FILM COATED ORAL DAILY
COMMUNITY
End: 2020-02-05

## 2019-10-30 RX ORDER — PROMETHAZINE HYDROCHLORIDE 25 MG/ML
12.5 INJECTION, SOLUTION INTRAMUSCULAR; INTRAVENOUS EVERY 4 HOURS PRN
Status: DISCONTINUED | OUTPATIENT
Start: 2019-10-30 | End: 2019-10-31 | Stop reason: HOSPADM

## 2019-10-30 RX ORDER — DIPHENHYDRAMINE HCL 25 MG
25 CAPSULE ORAL
Status: DISCONTINUED | OUTPATIENT
Start: 2019-10-30 | End: 2019-10-30 | Stop reason: HOSPADM

## 2019-10-30 RX ORDER — LORAZEPAM 2 MG/ML
1 INJECTION INTRAMUSCULAR EVERY 12 HOURS PRN
Status: DISCONTINUED | OUTPATIENT
Start: 2019-10-30 | End: 2019-10-31 | Stop reason: HOSPADM

## 2019-10-30 RX ORDER — MORPHINE SULFATE 2 MG/ML
2 INJECTION, SOLUTION INTRAMUSCULAR; INTRAVENOUS
Status: DISCONTINUED | OUTPATIENT
Start: 2019-10-30 | End: 2019-10-31 | Stop reason: HOSPADM

## 2019-10-30 RX ORDER — NALBUPHINE HCL 10 MG/ML
2 AMPUL (ML) INJECTION EVERY 4 HOURS PRN
Status: CANCELLED | OUTPATIENT
Start: 2019-10-30

## 2019-10-30 RX ORDER — CEFAZOLIN SODIUM IN 0.9 % NACL 3 G/100 ML
3 INTRAVENOUS SOLUTION, PIGGYBACK (ML) INTRAVENOUS
Status: COMPLETED | OUTPATIENT
Start: 2019-10-30 | End: 2019-10-30

## 2019-10-30 RX ORDER — POTASSIUM CHLORIDE 1.5 G/1.77G
40 POWDER, FOR SOLUTION ORAL AS NEEDED
Status: DISCONTINUED | OUTPATIENT
Start: 2019-10-30 | End: 2019-10-31 | Stop reason: HOSPADM

## 2019-10-30 RX ORDER — NALBUPHINE HCL 10 MG/ML
10 AMPUL (ML) INJECTION EVERY 4 HOURS PRN
Status: CANCELLED | OUTPATIENT
Start: 2019-10-30

## 2019-10-30 RX ORDER — PANTOPRAZOLE SODIUM 40 MG/10ML
40 INJECTION, POWDER, LYOPHILIZED, FOR SOLUTION INTRAVENOUS ONCE
Status: COMPLETED | OUTPATIENT
Start: 2019-10-30 | End: 2019-10-30

## 2019-10-30 RX ORDER — FAMOTIDINE 10 MG/ML
20 INJECTION, SOLUTION INTRAVENOUS DAILY
Status: DISCONTINUED | OUTPATIENT
Start: 2019-10-30 | End: 2019-10-31 | Stop reason: HOSPADM

## 2019-10-30 RX ORDER — SODIUM CHLORIDE 9 MG/ML
INJECTION, SOLUTION INTRAVENOUS AS NEEDED
Status: DISCONTINUED | OUTPATIENT
Start: 2019-10-30 | End: 2019-10-30 | Stop reason: HOSPADM

## 2019-10-30 RX ORDER — NALOXONE HCL 0.4 MG/ML
0.4 VIAL (ML) INJECTION AS NEEDED
Status: CANCELLED | OUTPATIENT
Start: 2019-10-30

## 2019-10-30 RX ORDER — METOCLOPRAMIDE HYDROCHLORIDE 5 MG/ML
10 INJECTION INTRAMUSCULAR; INTRAVENOUS EVERY 6 HOURS
Status: DISCONTINUED | OUTPATIENT
Start: 2019-10-30 | End: 2019-10-30

## 2019-10-30 RX ORDER — PROMETHAZINE HYDROCHLORIDE 25 MG/1
25 SUPPOSITORY RECTAL ONCE AS NEEDED
Status: CANCELLED | OUTPATIENT
Start: 2019-10-30

## 2019-10-30 RX ORDER — MIDAZOLAM HYDROCHLORIDE 1 MG/ML
1 INJECTION INTRAMUSCULAR; INTRAVENOUS
Status: DISCONTINUED | OUTPATIENT
Start: 2019-10-30 | End: 2019-10-30 | Stop reason: HOSPADM

## 2019-10-30 RX ORDER — SODIUM CHLORIDE, SODIUM LACTATE, POTASSIUM CHLORIDE, CALCIUM CHLORIDE 600; 310; 30; 20 MG/100ML; MG/100ML; MG/100ML; MG/100ML
75 INJECTION, SOLUTION INTRAVENOUS CONTINUOUS
Status: DISCONTINUED | OUTPATIENT
Start: 2019-10-30 | End: 2019-10-31 | Stop reason: HOSPADM

## 2019-10-30 RX ORDER — METOCLOPRAMIDE HYDROCHLORIDE 5 MG/ML
10 INJECTION INTRAMUSCULAR; INTRAVENOUS ONCE
Status: COMPLETED | OUTPATIENT
Start: 2019-10-30 | End: 2019-10-30

## 2019-10-30 RX ORDER — DEXAMETHASONE SODIUM PHOSPHATE 10 MG/ML
INJECTION INTRAMUSCULAR; INTRAVENOUS AS NEEDED
Status: DISCONTINUED | OUTPATIENT
Start: 2019-10-30 | End: 2019-10-30 | Stop reason: SURG

## 2019-10-30 RX ORDER — ONDANSETRON 2 MG/ML
4 INJECTION INTRAMUSCULAR; INTRAVENOUS EVERY 4 HOURS PRN
Status: DISCONTINUED | OUTPATIENT
Start: 2019-10-30 | End: 2019-10-31 | Stop reason: HOSPADM

## 2019-10-30 RX ORDER — POTASSIUM CHLORIDE 750 MG/1
40 CAPSULE, EXTENDED RELEASE ORAL AS NEEDED
Status: DISCONTINUED | OUTPATIENT
Start: 2019-10-30 | End: 2019-10-31 | Stop reason: HOSPADM

## 2019-10-30 RX ORDER — LORAZEPAM 1 MG/1
1 TABLET ORAL EVERY 12 HOURS PRN
Status: DISCONTINUED | OUTPATIENT
Start: 2019-10-30 | End: 2019-10-31 | Stop reason: HOSPADM

## 2019-10-30 RX ORDER — FLUMAZENIL 0.1 MG/ML
0.2 INJECTION INTRAVENOUS AS NEEDED
Status: DISCONTINUED | OUTPATIENT
Start: 2019-10-30 | End: 2019-10-30 | Stop reason: HOSPADM

## 2019-10-30 RX ORDER — LABETALOL HYDROCHLORIDE 5 MG/ML
10 INJECTION, SOLUTION INTRAVENOUS
Status: DISCONTINUED | OUTPATIENT
Start: 2019-10-30 | End: 2019-10-31 | Stop reason: HOSPADM

## 2019-10-30 RX ORDER — SODIUM CHLORIDE 9 MG/ML
9 INJECTION, SOLUTION INTRAVENOUS CONTINUOUS PRN
Status: DISCONTINUED | OUTPATIENT
Start: 2019-10-30 | End: 2019-10-30 | Stop reason: HOSPADM

## 2019-10-30 RX ORDER — HYDRALAZINE HYDROCHLORIDE 20 MG/ML
5 INJECTION INTRAMUSCULAR; INTRAVENOUS
Status: DISCONTINUED | OUTPATIENT
Start: 2019-10-30 | End: 2019-10-30 | Stop reason: HOSPADM

## 2019-10-30 RX ORDER — NALOXONE HCL 0.4 MG/ML
0.1 VIAL (ML) INJECTION
Status: DISCONTINUED | OUTPATIENT
Start: 2019-10-30 | End: 2019-10-31 | Stop reason: HOSPADM

## 2019-10-30 RX ORDER — MAGNESIUM HYDROXIDE 1200 MG/15ML
LIQUID ORAL AS NEEDED
Status: DISCONTINUED | OUTPATIENT
Start: 2019-10-30 | End: 2019-10-30 | Stop reason: HOSPADM

## 2019-10-30 RX ORDER — FENTANYL CITRATE 50 UG/ML
25 INJECTION, SOLUTION INTRAMUSCULAR; INTRAVENOUS
Status: CANCELLED | OUTPATIENT
Start: 2019-10-30

## 2019-10-30 RX ORDER — SODIUM CHLORIDE 0.9 % (FLUSH) 0.9 %
3 SYRINGE (ML) INJECTION EVERY 12 HOURS SCHEDULED
Status: DISCONTINUED | OUTPATIENT
Start: 2019-10-30 | End: 2019-10-31 | Stop reason: HOSPADM

## 2019-10-30 RX ORDER — FENTANYL CITRATE 50 UG/ML
50 INJECTION, SOLUTION INTRAMUSCULAR; INTRAVENOUS
Status: DISCONTINUED | OUTPATIENT
Start: 2019-10-30 | End: 2019-10-30 | Stop reason: HOSPADM

## 2019-10-30 RX ORDER — HYDRALAZINE HYDROCHLORIDE 20 MG/ML
5 INJECTION INTRAMUSCULAR; INTRAVENOUS
Status: CANCELLED | OUTPATIENT
Start: 2019-10-30

## 2019-10-30 RX ORDER — PROMETHAZINE HYDROCHLORIDE 25 MG/ML
6.25 INJECTION, SOLUTION INTRAMUSCULAR; INTRAVENOUS ONCE AS NEEDED
Status: CANCELLED | OUTPATIENT
Start: 2019-10-30

## 2019-10-30 RX ORDER — PROMETHAZINE HYDROCHLORIDE 25 MG/1
25 SUPPOSITORY RECTAL ONCE AS NEEDED
Status: DISCONTINUED | OUTPATIENT
Start: 2019-10-30 | End: 2019-10-30 | Stop reason: HOSPADM

## 2019-10-30 RX ORDER — FAMOTIDINE 10 MG/ML
20 INJECTION, SOLUTION INTRAVENOUS EVERY 12 HOURS SCHEDULED
Status: DISCONTINUED | OUTPATIENT
Start: 2019-10-30 | End: 2019-10-30

## 2019-10-30 RX ORDER — SCOLOPAMINE TRANSDERMAL SYSTEM 1 MG/1
1 PATCH, EXTENDED RELEASE TRANSDERMAL ONCE
Status: DISCONTINUED | OUTPATIENT
Start: 2019-10-30 | End: 2019-10-30

## 2019-10-30 RX ORDER — ACETAMINOPHEN 160 MG/5ML
975 SOLUTION ORAL ONCE
Status: COMPLETED | OUTPATIENT
Start: 2019-10-30 | End: 2019-10-30

## 2019-10-30 RX ORDER — ONDANSETRON 4 MG/1
4 TABLET, ORALLY DISINTEGRATING ORAL EVERY 4 HOURS PRN
Status: DISCONTINUED | OUTPATIENT
Start: 2019-10-30 | End: 2019-10-31 | Stop reason: HOSPADM

## 2019-10-30 RX ORDER — ACETAMINOPHEN 325 MG/1
650 TABLET ORAL ONCE AS NEEDED
Status: DISCONTINUED | OUTPATIENT
Start: 2019-10-30 | End: 2019-10-30 | Stop reason: HOSPADM

## 2019-10-30 RX ORDER — HYDROMORPHONE HYDROCHLORIDE 1 MG/ML
0.5 INJECTION, SOLUTION INTRAMUSCULAR; INTRAVENOUS; SUBCUTANEOUS
Status: DISCONTINUED | OUTPATIENT
Start: 2019-10-30 | End: 2019-10-30 | Stop reason: HOSPADM

## 2019-10-30 RX ADMIN — METOCLOPRAMIDE 5 MG: 5 INJECTION, SOLUTION INTRAMUSCULAR; INTRAVENOUS at 13:41

## 2019-10-30 RX ADMIN — ROCURONIUM BROMIDE 40 MG: 10 INJECTION, SOLUTION INTRAVENOUS at 07:38

## 2019-10-30 RX ADMIN — THIAMINE HYDROCHLORIDE 250 ML/HR: 100 INJECTION, SOLUTION INTRAMUSCULAR; INTRAVENOUS at 23:36

## 2019-10-30 RX ADMIN — FENTANYL CITRATE 50 MCG: 50 INJECTION INTRAMUSCULAR; INTRAVENOUS at 07:35

## 2019-10-30 RX ADMIN — FENTANYL CITRATE 50 MCG: 50 INJECTION INTRAMUSCULAR; INTRAVENOUS at 08:24

## 2019-10-30 RX ADMIN — SCOPALAMINE 1 PATCH: 1 PATCH, EXTENDED RELEASE TRANSDERMAL at 07:08

## 2019-10-30 RX ADMIN — DEXAMETHASONE SODIUM PHOSPHATE 4 MG: 10 INJECTION INTRAMUSCULAR; INTRAVENOUS at 07:48

## 2019-10-30 RX ADMIN — GLYCOPYRROLATE 0.6 MG: 0.2 INJECTION INTRAMUSCULAR; INTRAVENOUS at 08:23

## 2019-10-30 RX ADMIN — CARVEDILOL 6.25 MG: 6.25 TABLET, FILM COATED ORAL at 17:43

## 2019-10-30 RX ADMIN — MIDAZOLAM 1 MG: 1 INJECTION INTRAMUSCULAR; INTRAVENOUS at 07:16

## 2019-10-30 RX ADMIN — PANTOPRAZOLE SODIUM 40 MG: 40 INJECTION, POWDER, FOR SOLUTION INTRAVENOUS at 07:09

## 2019-10-30 RX ADMIN — PHENYLEPHRINE HYDROCHLORIDE 100 MCG: 10 INJECTION INTRAVENOUS at 07:45

## 2019-10-30 RX ADMIN — ENOXAPARIN SODIUM 40 MG: 40 INJECTION SUBCUTANEOUS at 07:19

## 2019-10-30 RX ADMIN — SODIUM CHLORIDE, PRESERVATIVE FREE 3 ML: 5 INJECTION INTRAVENOUS at 20:00

## 2019-10-30 RX ADMIN — HYDROCODONE BITARTRATE AND ACETAMINOPHEN 15 ML: 7.5; 325 SOLUTION ORAL at 10:22

## 2019-10-30 RX ADMIN — SODIUM CHLORIDE, POTASSIUM CHLORIDE, SODIUM LACTATE AND CALCIUM CHLORIDE 75 ML/HR: 600; 310; 30; 20 INJECTION, SOLUTION INTRAVENOUS at 09:57

## 2019-10-30 RX ADMIN — FUROSEMIDE 20 MG: 20 TABLET ORAL at 17:43

## 2019-10-30 RX ADMIN — ONDANSETRON 4 MG: 2 INJECTION INTRAMUSCULAR; INTRAVENOUS at 08:18

## 2019-10-30 RX ADMIN — CHLORHEXIDINE GLUCONATE 15 ML: 1.2 RINSE ORAL at 07:09

## 2019-10-30 RX ADMIN — PHENYLEPHRINE HYDROCHLORIDE 100 MCG: 10 INJECTION INTRAVENOUS at 07:58

## 2019-10-30 RX ADMIN — HYOSCYAMINE SULFATE 125 MCG: 0.12 TABLET, ORALLY DISINTEGRATING ORAL at 13:41

## 2019-10-30 RX ADMIN — LIDOCAINE HYDROCHLORIDE 100 MG: 20 INJECTION, SOLUTION INFILTRATION; PERINEURAL at 07:38

## 2019-10-30 RX ADMIN — GLYCOPYRROLATE 0.2 MG: 0.2 INJECTION INTRAMUSCULAR; INTRAVENOUS at 07:55

## 2019-10-30 RX ADMIN — PHENYLEPHRINE HYDROCHLORIDE 100 MCG: 10 INJECTION INTRAVENOUS at 07:51

## 2019-10-30 RX ADMIN — ONDANSETRON 4 MG: 2 INJECTION INTRAMUSCULAR; INTRAVENOUS at 17:44

## 2019-10-30 RX ADMIN — HYOSCYAMINE SULFATE 125 MCG: 0.12 TABLET, ORALLY DISINTEGRATING ORAL at 17:43

## 2019-10-30 RX ADMIN — CEFAZOLIN SODIUM 3 G: 10 INJECTION, POWDER, FOR SOLUTION INTRAVENOUS at 07:20

## 2019-10-30 RX ADMIN — POTASSIUM CHLORIDE 40 MEQ: 1.5 POWDER, FOR SOLUTION ORAL at 19:07

## 2019-10-30 RX ADMIN — HYDROCODONE BITARTRATE AND ACETAMINOPHEN 15 ML: 7.5; 325 SOLUTION ORAL at 17:49

## 2019-10-30 RX ADMIN — SODIUM CHLORIDE, POTASSIUM CHLORIDE, SODIUM LACTATE AND CALCIUM CHLORIDE 75 ML/HR: 600; 310; 30; 20 INJECTION, SOLUTION INTRAVENOUS at 23:36

## 2019-10-30 RX ADMIN — ACETAMINOPHEN ORAL SOLUTION 975 MG: 325 SOLUTION ORAL at 06:54

## 2019-10-30 RX ADMIN — HYDROCODONE BITARTRATE AND ACETAMINOPHEN 15 ML: 7.5; 325 SOLUTION ORAL at 23:42

## 2019-10-30 RX ADMIN — HYOSCYAMINE SULFATE 125 MCG: 0.12 TABLET, ORALLY DISINTEGRATING ORAL at 19:58

## 2019-10-30 RX ADMIN — PROPOFOL 200 MG: 10 INJECTION, EMULSION INTRAVENOUS at 07:38

## 2019-10-30 RX ADMIN — SODIUM CHLORIDE, PRESERVATIVE FREE 3 ML: 5 INJECTION INTRAVENOUS at 09:57

## 2019-10-30 RX ADMIN — FENTANYL CITRATE 50 MCG: 50 INJECTION INTRAMUSCULAR; INTRAVENOUS at 08:32

## 2019-10-30 RX ADMIN — METOCLOPRAMIDE 5 MG: 5 INJECTION, SOLUTION INTRAMUSCULAR; INTRAVENOUS at 17:44

## 2019-10-30 RX ADMIN — METOCLOPRAMIDE 10 MG: 5 INJECTION, SOLUTION INTRAMUSCULAR; INTRAVENOUS at 07:16

## 2019-10-30 RX ADMIN — Medication 3 MG: at 08:23

## 2019-10-30 RX ADMIN — SODIUM CHLORIDE 9 ML/HR: 9 INJECTION, SOLUTION INTRAVENOUS at 07:07

## 2019-10-30 NOTE — ANESTHESIA POSTPROCEDURE EVALUATION
Patient: Ro Connor    Procedure Summary     Date:  10/30/19 Room / Location:   [facility]  [facility]    Anesthesia Start:  0732 Anesthesia Stop:  0837    Procedure:  GASTRIC SLEEVE LAPAROSCOPIC With Hiatal Hernia Repair (N/A Abdomen) Diagnosis:       Obesity, Class III, BMI 40-49.9 (morbid obesity) (CMS/HCC)      Hiatal hernia      (Obesity, Class III, BMI 40-49.9 (morbid obesity) (CMS/HCC) [E66.01])      (Hiatal hernia [K44.9])    Surgeon:  Dane Delgado Jr., MD Provider:  Tj Neri MD    Anesthesia Type:  general ASA Status:  4          Anesthesia Type: general  Last vitals  BP   134/64 (10/30/19 0900)   Temp   36.5 °C (97.7 °F) (10/30/19 0833)   Pulse   65 (10/30/19 0915)   Resp   16 (10/30/19 0915)     SpO2   100 % (10/30/19 0915)     Post Anesthesia Care and Evaluation    Patient location during evaluation: bedside  Patient participation: complete - patient participated  Level of consciousness: awake and alert  Pain management: adequate  Airway patency: patent  Anesthetic complications: No anesthetic complications    Cardiovascular status: acceptable  Respiratory status: acceptable  Hydration status: acceptable    Comments: /64   Pulse 65   Temp 36.5 °C (97.7 °F) (Temporal)   Resp 16   SpO2 100%

## 2019-10-30 NOTE — ANESTHESIA PROCEDURE NOTES
Airway  Urgency: elective    Date/Time: 10/30/2019 7:41 AM  Airway not difficult    General Information and Staff    Patient location during procedure: OR  Anesthesiologist: Tj Neri MD  CRNA: Jonathan Malin CRNA    Indications and Patient Condition  Indications for airway management: airway protection    Preoxygenated: yes  MILS maintained throughout  Mask difficulty assessment: 1 - vent by mask    Final Airway Details  Final airway type: endotracheal airway      Successful airway: ETT  Cuffed: yes   Successful intubation technique: direct laryngoscopy  Facilitating devices/methods: intubating stylet  Endotracheal tube insertion site: oral  Blade: Obdulia  Blade size: 3  ETT size (mm): 7.0  Cormack-Lehane Classification: grade I - full view of glottis  Placement verified by: chest auscultation and capnometry   Cuff volume (mL): 6  Measured from: lips  ETT/EBT  to lips (cm): 20  Number of attempts at approach: 1  Assessment: lips, teeth, and gum same as pre-op and atraumatic intubation

## 2019-10-30 NOTE — ANESTHESIA PREPROCEDURE EVALUATION
Anesthesia Evaluation     no history of anesthetic complications:  NPO Solid Status: > 8 hours             Airway   Mallampati: II  TM distance: >3 FB  Neck ROM: full  No difficulty expected  Dental - normal exam     Pulmonary - normal exam   (+) sleep apnea on CPAP,   (-) not a smoker  Cardiovascular   Exercise tolerance: poor (<4 METS)    ECG reviewed  Rhythm: regular    (+) hypertension, hyperlipidemia,       Neuro/Psych  (+) psychiatric history,     GI/Hepatic/Renal/Endo    (+) obesity, morbid obesity, hiatal hernia, GERD,  renal disease CRI,     ROS Comment: Stage 4 CRI due to NSAIDS    Musculoskeletal     (+) arthralgias,   Abdominal    Substance History      OB/GYN          Other                        Anesthesia Plan    ASA 4     general   (  D/W R&B of GA including but not limited to: heart, lung, liver, kidney, neurologic problems, positioning injuries, dental damage, corneal abrasion and TMJ.  .    Consider TIVA to avoid inhalational, no ketorolac, judicious use of non depolarizers and maintain SBP for renal blood flow.)  intravenous induction

## 2019-10-31 VITALS
OXYGEN SATURATION: 94 % | HEIGHT: 60 IN | DIASTOLIC BLOOD PRESSURE: 80 MMHG | WEIGHT: 216.05 LBS | SYSTOLIC BLOOD PRESSURE: 174 MMHG | HEART RATE: 78 BPM | TEMPERATURE: 98.5 F | RESPIRATION RATE: 18 BRPM | BODY MASS INDEX: 42.42 KG/M2

## 2019-10-31 LAB
ALBUMIN SERPL-MCNC: 3.7 G/DL (ref 3.5–5.2)
ALBUMIN/GLOB SERPL: 1 G/DL
ALP SERPL-CCNC: 77 U/L (ref 39–117)
ALT SERPL W P-5'-P-CCNC: 20 U/L (ref 1–33)
ANION GAP SERPL CALCULATED.3IONS-SCNC: 9.4 MMOL/L (ref 5–15)
AST SERPL-CCNC: 31 U/L (ref 1–32)
BASOPHILS # BLD AUTO: 0.06 10*3/MM3 (ref 0–0.2)
BASOPHILS NFR BLD AUTO: 0.4 % (ref 0–1.5)
BILIRUB SERPL-MCNC: 0.3 MG/DL (ref 0.2–1.2)
BUN BLD-MCNC: 40 MG/DL (ref 8–23)
BUN/CREAT SERPL: 15.7 (ref 7–25)
CALCIUM SPEC-SCNC: 8.9 MG/DL (ref 8.6–10.5)
CHLORIDE SERPL-SCNC: 102 MMOL/L (ref 98–107)
CO2 SERPL-SCNC: 28.6 MMOL/L (ref 22–29)
CREAT BLD-MCNC: 2.54 MG/DL (ref 0.57–1)
CYTO UR: NORMAL
DEPRECATED RDW RBC AUTO: 45.8 FL (ref 37–54)
EOSINOPHIL # BLD AUTO: 0.01 10*3/MM3 (ref 0–0.4)
EOSINOPHIL NFR BLD AUTO: 0.1 % (ref 0.3–6.2)
ERYTHROCYTE [DISTWIDTH] IN BLOOD BY AUTOMATED COUNT: 13.8 % (ref 12.3–15.4)
GFR SERPL CREATININE-BSD FRML MDRD: 19 ML/MIN/1.73
GLOBULIN UR ELPH-MCNC: 3.7 GM/DL
GLUCOSE BLD-MCNC: 143 MG/DL (ref 65–99)
HCT VFR BLD AUTO: 31.8 % (ref 34–46.6)
HGB BLD-MCNC: 10.4 G/DL (ref 12–15.9)
IMM GRANULOCYTES # BLD AUTO: 0.14 10*3/MM3 (ref 0–0.05)
IMM GRANULOCYTES NFR BLD AUTO: 0.8 % (ref 0–0.5)
LAB AP CASE REPORT: NORMAL
LYMPHOCYTES # BLD AUTO: 2.29 10*3/MM3 (ref 0.7–3.1)
LYMPHOCYTES NFR BLD AUTO: 13.4 % (ref 19.6–45.3)
MAGNESIUM SERPL-MCNC: 2.5 MG/DL (ref 1.6–2.4)
MCH RBC QN AUTO: 29.6 PG (ref 26.6–33)
MCHC RBC AUTO-ENTMCNC: 32.7 G/DL (ref 31.5–35.7)
MCV RBC AUTO: 90.6 FL (ref 79–97)
MONOCYTES # BLD AUTO: 1.19 10*3/MM3 (ref 0.1–0.9)
MONOCYTES NFR BLD AUTO: 7 % (ref 5–12)
NEUTROPHILS # BLD AUTO: 13.4 10*3/MM3 (ref 1.7–7)
NEUTROPHILS NFR BLD AUTO: 78.3 % (ref 42.7–76)
NRBC BLD AUTO-RTO: 0 /100 WBC (ref 0–0.2)
PATH REPORT.FINAL DX SPEC: NORMAL
PATH REPORT.GROSS SPEC: NORMAL
PHOSPHATE SERPL-MCNC: 2.4 MG/DL (ref 2.5–4.5)
PLATELET # BLD AUTO: 202 10*3/MM3 (ref 140–450)
PMV BLD AUTO: 12.3 FL (ref 6–12)
POTASSIUM BLD-SCNC: 3.6 MMOL/L (ref 3.5–5.2)
PROT SERPL-MCNC: 7.4 G/DL (ref 6–8.5)
RBC # BLD AUTO: 3.51 10*6/MM3 (ref 3.77–5.28)
SODIUM BLD-SCNC: 140 MMOL/L (ref 136–145)
WBC NRBC COR # BLD: 17.09 10*3/MM3 (ref 3.4–10.8)

## 2019-10-31 PROCEDURE — 25010000002 METOCLOPRAMIDE PER 10 MG: Performed by: SURGERY

## 2019-10-31 PROCEDURE — 84100 ASSAY OF PHOSPHORUS: CPT | Performed by: SURGERY

## 2019-10-31 PROCEDURE — 85025 COMPLETE CBC W/AUTO DIFF WBC: CPT | Performed by: SURGERY

## 2019-10-31 PROCEDURE — 25010000002 THIAMINE PER 100 MG: Performed by: SURGERY

## 2019-10-31 PROCEDURE — 25010000002 ENOXAPARIN PER 10 MG: Performed by: SURGERY

## 2019-10-31 PROCEDURE — 83735 ASSAY OF MAGNESIUM: CPT | Performed by: SURGERY

## 2019-10-31 PROCEDURE — 25010000002 CYANOCOBALAMIN PER 1000 MCG: Performed by: SURGERY

## 2019-10-31 PROCEDURE — 80053 COMPREHEN METABOLIC PANEL: CPT | Performed by: SURGERY

## 2019-10-31 RX ORDER — ONDANSETRON 4 MG/1
4 TABLET, ORALLY DISINTEGRATING ORAL EVERY 4 HOURS PRN
Qty: 8 TABLET | Refills: 0 | Status: SHIPPED | OUTPATIENT
Start: 2019-10-31 | End: 2019-11-07

## 2019-10-31 RX ADMIN — METOCLOPRAMIDE 5 MG: 5 INJECTION, SOLUTION INTRAMUSCULAR; INTRAVENOUS at 02:57

## 2019-10-31 RX ADMIN — ENOXAPARIN SODIUM 30 MG: 30 INJECTION SUBCUTANEOUS at 07:51

## 2019-10-31 RX ADMIN — CARVEDILOL 6.25 MG: 6.25 TABLET, FILM COATED ORAL at 07:49

## 2019-10-31 RX ADMIN — HYDROCODONE BITARTRATE AND ACETAMINOPHEN 15 ML: 7.5; 325 SOLUTION ORAL at 04:30

## 2019-10-31 RX ADMIN — CYANOCOBALAMIN 1000 MCG: 1000 INJECTION, SOLUTION INTRAMUSCULAR at 07:50

## 2019-10-31 RX ADMIN — FAMOTIDINE 20 MG: 10 INJECTION, SOLUTION INTRAVENOUS at 07:50

## 2019-10-31 RX ADMIN — METOCLOPRAMIDE 5 MG: 5 INJECTION, SOLUTION INTRAMUSCULAR; INTRAVENOUS at 07:50

## 2019-10-31 RX ADMIN — HYDROCODONE BITARTRATE AND ACETAMINOPHEN 15 ML: 7.5; 325 SOLUTION ORAL at 10:06

## 2019-10-31 RX ADMIN — HYOSCYAMINE SULFATE 125 MCG: 0.12 TABLET, ORALLY DISINTEGRATING ORAL at 07:50

## 2019-10-31 RX ADMIN — SODIUM CHLORIDE, PRESERVATIVE FREE 3 ML: 5 INJECTION INTRAVENOUS at 07:51

## 2019-10-31 RX ADMIN — POTASSIUM CHLORIDE 40 MEQ: 750 CAPSULE, EXTENDED RELEASE ORAL at 09:55

## 2019-10-31 RX ADMIN — FUROSEMIDE 20 MG: 20 TABLET ORAL at 07:51

## 2019-11-04 NOTE — PROGRESS NOTES
Case Management Discharge Note    Final Note: Discharged home with no needs.  Transported by family    Destination      No service has been selected for the patient.      Durable Medical Equipment      No service has been selected for the patient.      Dialysis/Infusion      No service has been selected for the patient.      Home Medical Care      No service has been selected for the patient.      Therapy      No service has been selected for the patient.      Community Resources      No service has been selected for the patient.        Transportation Services  Private: Car    Final Discharge Disposition Code: 01 - home or self-care

## 2019-11-07 ENCOUNTER — OFFICE VISIT (OUTPATIENT)
Dept: BARIATRICS/WEIGHT MGMT | Facility: CLINIC | Age: 64
End: 2019-11-07

## 2019-11-07 VITALS
HEIGHT: 60 IN | DIASTOLIC BLOOD PRESSURE: 77 MMHG | HEART RATE: 78 BPM | SYSTOLIC BLOOD PRESSURE: 134 MMHG | BODY MASS INDEX: 41.03 KG/M2 | RESPIRATION RATE: 18 BRPM | WEIGHT: 209 LBS | TEMPERATURE: 98.4 F

## 2019-11-07 DIAGNOSIS — Z71.3 DIETARY COUNSELING: ICD-10-CM

## 2019-11-07 DIAGNOSIS — E66.01 OBESITY, CLASS III, BMI 40-49.9 (MORBID OBESITY) (HCC): Primary | ICD-10-CM

## 2019-11-07 DIAGNOSIS — Z98.84 S/P LAPAROSCOPIC SLEEVE GASTRECTOMY: ICD-10-CM

## 2019-11-07 PROBLEM — R12 HEARTBURN: Status: RESOLVED | Noted: 2019-06-03 | Resolved: 2019-11-07

## 2019-11-07 PROBLEM — N25.81 SECONDARY HYPERPARATHYROIDISM: Status: ACTIVE | Noted: 2019-09-06

## 2019-11-07 PROBLEM — K44.9 HIATAL HERNIA: Status: RESOLVED | Noted: 2019-10-15 | Resolved: 2019-11-07

## 2019-11-07 PROCEDURE — 99024 POSTOP FOLLOW-UP VISIT: CPT | Performed by: NURSE PRACTITIONER

## 2019-11-07 RX ORDER — HYDROCODONE BITARTRATE AND ACETAMINOPHEN 7.5; 325 MG/1; MG/1
1 TABLET ORAL EVERY 6 HOURS PRN
COMMUNITY

## 2019-11-07 NOTE — PROGRESS NOTES
[facility] BARIATRIC [facility] BARIATRIC SURGERY  [address] [facility] [address]  [facility] [address]  [phone]  [address] [address] [phone]  Dept: [phone]  11/7/2019      Ro Connor.  [id number]  [id number]  1955  female      Chief Complaint   Patient presents with   • Post-op     1 week post op sleeve       BH Post-Op Bariatric Surgery:   Ro Connor is status post laparopscopic Laparoscopic Sleeve/HH procedure, performed on 10/31/19.     HPI:   Today's weight is 94.8 kg (209 lb) pounds, today's BMI is Body mass index is 40.82 kg/m²., she has a  loss of 7 pounds since the last visit and her weight loss since surgery is 7 pounds. The patient reports a decreased portion size and loss of appetite.  Ro Connor denies n/v/d/regurg/reflux and reports constipation. The patient c/o appropriate post-op incisional discomfort that is improving. she is doing well with protein and water intake so far. Taking their vitamins, walking and using IS. Denies fevers, chills, chest pain or shortness of air.      Diet and Exercise: Diet history reviewed and discussed with the patient. Weight loss/gains to date discussed with the patient. No carbonated beverage consumption and exercising regularly- walking frequently.   Supplements: multivitamins, B-12, calcium, iron, B-1 and Vitamin D.     Review of Systems   Gastrointestinal: Positive for abdominal pain (post op) and constipation.   All other systems reviewed and are negative.      Patient Active Problem List   Diagnosis   • Chronic fatigue   • Ankle swelling   • Essential hypertension   • Hyperlipidemia   • SEVEN (obstructive sleep apnea)   • GERD (gastroesophageal reflux disease)   • Multiple joint pain   • Anxiety   • Renal failure, chronic, stage 4 (severe) (CMS/HCC)   • Other kyphoscoliosis and scoliosis   • Vitamin D deficiency   • Obesity, Class III, BMI 40-49.9 (morbid obesity)  (CMS/[facility])   • Secondary hyperparathyroidism (CMS/[facility])   • Dietary counseling   • S/P laparoscopic sleeve gastrectomy       The following portions of the patient's history were reviewed and updated as appropriate: allergies, current medications, past family history, past medical history, past social history, past surgical history and problem list.    Vitals:    11/07/19 0809   BP: 134/77   Pulse: 78   Resp: 18   Temp: 98.4 °F (36.9 °C)       Physical Exam   Constitutional: She is oriented to person, place, and time. She appears well-developed and well-nourished.   HENT:   Head: Normocephalic and atraumatic.   Eyes: EOM are normal.   Cardiovascular: Normal rate.   Pulmonary/Chest: Effort normal.   Abdominal: Soft.   Musculoskeletal: Normal range of motion.   Neurological: She is alert and oriented to person, place, and time.   Skin: Skin is warm and dry.   Psychiatric: She has a normal mood and affect. Her behavior is normal. Judgment and thought content normal.   Vitals reviewed.      Assessment:   Post-op, the patient is doing well.     Encounter Diagnoses   Name Primary?   • Obesity, Class III, BMI 40-49.9 (morbid obesity) (CMS/[facility]) Yes   • Dietary counseling    • S/P laparoscopic sleeve gastrectomy        Plan:   Reviewed with patient the importance of following the manual for diet progression. Increase activity as tolerated. Continue increasing daily intake of protein and water.   Return to work: the patient is to return to 3 weeks from their surgery date with no restrictions unless they develop medical problems in which we will see them back in the office. They received a note in our office today with their return to work date.  Activity restrictions: no lifting, pushing or pulling over 25lbs for 3 weeks.   Recommended patient be sure to get at least 70 grams of protein per day. Discussed with the patient the recommended amount of water per day to intake. Reviewed vitamin requirements. Be sure to do routine  exercise and increase activity as tolerated. No asa, nsaids or steroids for 8 weeks. Patient may use miralax as needed if necessary.     Instructions / Recommendations: dietary counseling recommended, recommended a daily protein intake of  grams, vitamin supplement(s) recommended, recommended exercising at least 150 minutes per week, behavior modifications recommended and instructed to call the office for concerns, questions, or problems.     The patient was instructed to follow up at one month follow up appt.     The patient was counseled regarding post op bariatric manual

## 2019-11-13 ENCOUNTER — OFFICE VISIT CONVERTED (OUTPATIENT)
Dept: INTERNAL MEDICINE | Facility: CLINIC | Age: 64
End: 2019-11-13
Attending: NURSE PRACTITIONER

## 2019-12-05 ENCOUNTER — OFFICE VISIT (OUTPATIENT)
Dept: BARIATRICS/WEIGHT MGMT | Facility: CLINIC | Age: 64
End: 2019-12-05

## 2019-12-05 ENCOUNTER — HOSPITAL ENCOUNTER (OUTPATIENT)
Dept: OTHER | Facility: HOSPITAL | Age: 64
Discharge: HOME | End: 2019-12-05
Attending: NURSE PRACTITIONER

## 2019-12-05 VITALS
BODY MASS INDEX: 38.48 KG/M2 | RESPIRATION RATE: 18 BRPM | HEIGHT: 60 IN | SYSTOLIC BLOOD PRESSURE: 126 MMHG | WEIGHT: 196 LBS | TEMPERATURE: 97.3 F | HEART RATE: 68 BPM | DIASTOLIC BLOOD PRESSURE: 76 MMHG

## 2019-12-05 DIAGNOSIS — E44.1 MILD PROTEIN-CALORIE MALNUTRITION (HCC): ICD-10-CM

## 2019-12-05 DIAGNOSIS — I10 ESSENTIAL HYPERTENSION: ICD-10-CM

## 2019-12-05 DIAGNOSIS — E66.9 OBESITY, CLASS II, BMI 35-39.9: Primary | ICD-10-CM

## 2019-12-05 DIAGNOSIS — E78.5 HYPERLIPIDEMIA, UNSPECIFIED HYPERLIPIDEMIA TYPE: ICD-10-CM

## 2019-12-05 DIAGNOSIS — Z71.3 DIETARY COUNSELING: ICD-10-CM

## 2019-12-05 DIAGNOSIS — Z98.84 S/P LAPAROSCOPIC SLEEVE GASTRECTOMY: ICD-10-CM

## 2019-12-05 DIAGNOSIS — E55.9 VITAMIN D DEFICIENCY: ICD-10-CM

## 2019-12-05 PROBLEM — E66.01 OBESITY, CLASS III, BMI 40-49.9 (MORBID OBESITY) (HCC): Status: RESOLVED | Noted: 2019-10-15 | Resolved: 2019-12-05

## 2019-12-05 PROBLEM — E66.813 OBESITY, CLASS III, BMI 40-49.9 (MORBID OBESITY): Status: RESOLVED | Noted: 2019-10-15 | Resolved: 2019-12-05

## 2019-12-05 PROBLEM — E66.812 OBESITY, CLASS II, BMI 35-39.9: Status: ACTIVE | Noted: 2019-12-05

## 2019-12-05 LAB
25(OH)D3 SERPL-MCNC: 77.8 NG/ML (ref 30–100)
ALBUMIN SERPL-MCNC: 4 G/DL (ref 3.5–5)
ALBUMIN/GLOB SERPL: 1.1 {RATIO} (ref 1.4–2.6)
ALP SERPL-CCNC: 82 U/L (ref 43–160)
ALT SERPL-CCNC: 16 U/L (ref 10–40)
ANION GAP SERPL CALC-SCNC: 21 MMOL/L (ref 8–19)
AST SERPL-CCNC: 24 U/L (ref 15–50)
BASOPHILS # BLD AUTO: 0.05 10*3/UL (ref 0–0.2)
BASOPHILS NFR BLD AUTO: 0.4 % (ref 0–3)
BILIRUB SERPL-MCNC: 0.36 MG/DL (ref 0.2–1.3)
BUN SERPL-MCNC: 37 MG/DL (ref 5–25)
BUN/CREAT SERPL: 14 {RATIO} (ref 6–20)
CALCIUM SERPL-MCNC: 10.2 MG/DL (ref 8.7–10.4)
CHLORIDE SERPL-SCNC: 96 MMOL/L (ref 99–111)
CONV ABS IMM GRAN: 0.03 10*3/UL (ref 0–0.2)
CONV CO2: 25 MMOL/L (ref 22–32)
CONV IMMATURE GRAN: 0.3 % (ref 0–1.8)
CONV TOTAL PROTEIN: 7.8 G/DL (ref 6.3–8.2)
CREAT UR-MCNC: 2.69 MG/DL (ref 0.5–0.9)
DEPRECATED RDW RBC AUTO: 46.5 FL (ref 36.4–46.3)
EOSINOPHIL # BLD AUTO: 0.56 10*3/UL (ref 0–0.7)
EOSINOPHIL # BLD AUTO: 4.9 % (ref 0–7)
ERYTHROCYTE [DISTWIDTH] IN BLOOD BY AUTOMATED COUNT: 14.2 % (ref 11.7–14.4)
FERRITIN SERPL-MCNC: 657 NG/ML (ref 10–200)
FOLATE SERPL-MCNC: >20 NG/ML (ref 4.8–20)
GFR SERPLBLD BASED ON 1.73 SQ M-ARVRAT: 18 ML/MIN/{1.73_M2}
GLOBULIN UR ELPH-MCNC: 3.8 G/DL (ref 2–3.5)
GLUCOSE SERPL-MCNC: 86 MG/DL (ref 65–99)
HCT VFR BLD AUTO: 35.2 % (ref 37–47)
HGB BLD-MCNC: 11.4 G/DL (ref 12–16)
IRON SERPL-MCNC: 29 UG/DL (ref 60–170)
LYMPHOCYTES # BLD AUTO: 3.3 10*3/UL (ref 1–5)
LYMPHOCYTES NFR BLD AUTO: 28.6 % (ref 20–45)
MCH RBC QN AUTO: 28.8 PG (ref 27–31)
MCHC RBC AUTO-ENTMCNC: 32.4 G/DL (ref 33–37)
MCV RBC AUTO: 88.9 FL (ref 81–99)
MONOCYTES # BLD AUTO: 0.99 10*3/UL (ref 0.2–1.2)
MONOCYTES NFR BLD AUTO: 8.6 % (ref 3–10)
NEUTROPHILS # BLD AUTO: 6.59 10*3/UL (ref 2–8)
NEUTROPHILS NFR BLD AUTO: 57.2 % (ref 30–85)
NRBC CBCN: 0 % (ref 0–0.7)
OSMOLALITY SERPL CALC.SUM OF ELEC: 296 MOSM/KG (ref 273–304)
PLATELET # BLD AUTO: 175 10*3/UL (ref 130–400)
PMV BLD AUTO: 12.9 FL (ref 9.4–12.3)
POTASSIUM SERPL-SCNC: 3.4 MMOL/L (ref 3.5–5.3)
PREALB SERPL-MCNC: 17.6 MG/DL (ref 20–40)
RBC # BLD AUTO: 3.96 10*6/UL (ref 4.2–5.4)
SODIUM SERPL-SCNC: 139 MMOL/L (ref 135–147)
WBC # BLD AUTO: 11.52 10*3/UL (ref 4.8–10.8)

## 2019-12-05 PROCEDURE — 99024 POSTOP FOLLOW-UP VISIT: CPT | Performed by: NURSE PRACTITIONER

## 2019-12-05 NOTE — PROGRESS NOTES
[facility] BARIATRIC [facility] BARIATRIC SURGERY  [address] [facility] 221  [facility] [id number] [phone]  [address] [address] [phone]  Dept: [phone]  12/5/2019      Ro Connor.  [id number]  [id number]  1955  female      Chief Complaint   Patient presents with   • Post-op     1 month post op sleeve       BH Post-Op Bariatric Surgery:   Ro Connor is status post Laparoscopic Sleeve/HH procedure, performed on 10/3019     HPI:   Today's weight is 88.9 kg (196 lb) pounds, today's BMI is Body mass index is 38.28 kg/m²., she has a  loss of 13 pounds since the last visit and her weight loss since surgery is 20 pounds. The patient reports a decreased portion size and loss of appetite.      Ro Connor denies n/v/d/regurg/reflux/pain and reports constipation     Diet and Exercise: Diet history reviewed and discussed with the patient. Weight loss/gains to date discussed with the patient. The patient states they are eating around 50 or so grams of protein per day. She reports eating 3 meals per day, a typical portion size of 1/2 cup, eating 0 snacks per day, drinking 5 or more 8-oz. glasses of water per day, no carbonated beverage consumption and exercising regularly- walking on a treadmill every day.     Supplements: bariatric per sleeve.     Review of Systems   Gastrointestinal: Positive for nausea.   All other systems reviewed and are negative.      Patient Active Problem List   Diagnosis   • Chronic fatigue   • Ankle swelling   • Essential hypertension   • Hyperlipidemia   • SEVEN (obstructive sleep apnea)   • GERD (gastroesophageal reflux disease)   • Multiple joint pain   • Anxiety   • Renal failure, chronic, stage 4 (severe) (CMS/HCC)   • Other kyphoscoliosis and scoliosis   • Vitamin D deficiency   • Secondary hyperparathyroidism (CMS/HCC)   • Dietary counseling   • S/P laparoscopic sleeve gastrectomy   • Obesity, Class  II, BMI 35-39.9       Past Medical History:   Diagnosis Date   • Acid reflux    • Anemia    • Arthritis     OSTEO   • CKD (chronic kidney disease) stage 4, GFR 15-29 ml/min (CMS/HCC)    • DDD (degenerative disc disease), cervical    • DDD (degenerative disc disease), lumbosacral    • DDD (degenerative disc disease), thoracolumbar    • Hyperlipidemia    • Hypertension    • Sleep apnea     CPAP       The following portions of the patient's history were reviewed and updated as appropriate: allergies, current medications, past family history, past medical history, past social history, past surgical history and problem list.    Vitals:    12/05/19 1319   BP: 126/76   Pulse: 68   Resp: 18   Temp: 97.3 °F (36.3 °C)       Physical Exam   Constitutional: She is oriented to person, place, and time. She appears well-developed and well-nourished.   HENT:   Head: Normocephalic and atraumatic.   Eyes: EOM are normal.   Cardiovascular: Normal rate, regular rhythm and normal heart sounds.   Pulmonary/Chest: Effort normal and breath sounds normal.   Abdominal: Soft. Bowel sounds are normal. She exhibits no distension. There is no tenderness.   Musculoskeletal: Normal range of motion.   Neurological: She is alert and oriented to person, place, and time.   Skin: Skin is warm and dry.   Psychiatric: She has a normal mood and affect. Her behavior is normal. Judgment and thought content normal.   Vitals reviewed.      Assessment:   Post-op, the patient is doing well.     Encounter Diagnoses   Name Primary?   • Obesity, Class II, BMI 35-39.9 Yes   • Dietary counseling    • S/P laparoscopic sleeve gastrectomy        Plan:     Encouraged patient to be sure to get plenty of lean protein per day through small frequent meals all with a protein source. Continue with diet advancement per the manual. Continue increasing protein (discussed some options) and water. Reviewed weight loss goals and time frame- 12 mo goal 160#  Activity restrictions:  none.   Recommended patient be sure to get at least 70 grams of protein per day by eating small, frequent meals all with high lean protein choices. Be sure to limit/cut back on daily carbohydrate intake. Discussed with the patient the recommended amount of water per day to intake- half of body weight in ounces. Reviewed vitamin requirements. Be sure to do routine exercise, 150 minutes per week minimum, including both cardio and strength training.     Instructions / Recommendations: dietary counseling recommended, recommended a daily protein intake of  grams, vitamin supplement(s) recommended, recommended exercising at least 150 minutes per week, behavior modifications recommended and instructed to call the office for concerns, questions, or problems.     The patient was instructed to follow up in 2 months.     The patient was counseled regarding.

## 2019-12-09 LAB — CONV VITAMIN B-1 (THIAMINE): 274.5 NMOL/L (ref 66.5–200)

## 2019-12-10 LAB — METHYLMALONATE SERPL-SCNC: 213 NMOL/L (ref 0–378)

## 2019-12-11 ENCOUNTER — TELEPHONE (OUTPATIENT)
Dept: BARIATRICS/WEIGHT MGMT | Facility: CLINIC | Age: 64
End: 2019-12-11

## 2019-12-11 NOTE — TELEPHONE ENCOUNTER
Spoke to patient concerning lab results. Patient was persistent that she is needing iron infusion and has had them done by nephrologist in the past when at these levels. After speaking with Taya, due to patients kidney issues and renal failure, we are sending lab results to nephrologist and having them take over care as far as iron level concerns.           This patient has called regarding her lab results and is very concerned about her iron levels and thinking she might need a transfusion.Brittani has not reviewed results yet.  Will you take a look at them?    Thank you!    Sure.  She actually does not look, based on this lab work, to have an iron deficiency anemia.  Serum iron is mildly low but her ferritin, iron storage, is actually elevated.  That being said she does appear to have a normocytic anemia meaning her red blood cells are normal in size but she does have a low red blood cell count and a low hemoglobin.  This anemia however is not likely related to iron.     Typically iron deficiency anemia results in small red blood cells that are pale in color.  Her MCHC is mildly diminished indicating that her cells are worse less red than would be normal but are of normal size.    Her anemia is more likely related to her chronic kidney disease.  The kidneys make a hormone called erythropoietin which is responsible for how many red blood cells your body makes.  She has chronic kidney disease (stg 4) therefore her kidneys are not making as much erythropoietin. Therefore her body will make a lesser than average number of red blood cells, but the cells being made are normal size and color.    Taya,    Are you wanting her to take any form of iron supplement due to serum iron being mildly low? Or just trend it at her next visit?

## 2020-01-06 ENCOUNTER — HOSPITAL ENCOUNTER (OUTPATIENT)
Dept: OTHER | Facility: HOSPITAL | Age: 65
Discharge: HOME | End: 2020-01-06
Attending: INTERNAL MEDICINE

## 2020-01-06 LAB
ANION GAP SERPL CALC-SCNC: 19 MMOL/L (ref 8–19)
APPEARANCE UR: CLEAR
BASOPHILS # BLD AUTO: 0.08 10*3/UL (ref 0–0.2)
BASOPHILS NFR BLD AUTO: 0.7 % (ref 0–3)
BILIRUB UR QL: NEGATIVE
BUN SERPL-MCNC: 38 MG/DL (ref 5–25)
BUN/CREAT SERPL: 18 {RATIO} (ref 6–20)
CALCIUM SERPL-MCNC: 9.5 MG/DL (ref 8.7–10.4)
CHLORIDE SERPL-SCNC: 99 MMOL/L (ref 99–111)
COLOR UR: YELLOW
CONV ABS IMM GRAN: 0.04 10*3/UL (ref 0–0.2)
CONV CO2: 22 MMOL/L (ref 22–32)
CONV COLLECTION SOURCE (UA): ABNORMAL
CONV IMMATURE GRAN: 0.3 % (ref 0–1.8)
CONV UROBILINOGEN IN URINE BY AUTOMATED TEST STRIP: 0.2 {EHRLICHU}/DL (ref 0.1–1)
CREAT UR-MCNC: 2.07 MG/DL (ref 0.5–0.9)
DEPRECATED RDW RBC AUTO: 50.9 FL (ref 36.4–46.3)
EOSINOPHIL # BLD AUTO: 0.44 10*3/UL (ref 0–0.7)
EOSINOPHIL # BLD AUTO: 3.6 % (ref 0–7)
ERYTHROCYTE [DISTWIDTH] IN BLOOD BY AUTOMATED COUNT: 15.5 % (ref 11.7–14.4)
GFR SERPLBLD BASED ON 1.73 SQ M-ARVRAT: 25 ML/MIN/{1.73_M2}
GLUCOSE SERPL-MCNC: 80 MG/DL (ref 65–99)
GLUCOSE UR QL: NEGATIVE MG/DL
HCT VFR BLD AUTO: 35 % (ref 37–47)
HGB BLD-MCNC: 11 G/DL (ref 12–16)
HGB UR QL STRIP: NEGATIVE
KETONES UR QL STRIP: NEGATIVE MG/DL
LEUKOCYTE ESTERASE UR QL STRIP: NEGATIVE
LYMPHOCYTES # BLD AUTO: 3.47 10*3/UL (ref 1–5)
LYMPHOCYTES NFR BLD AUTO: 28.3 % (ref 20–45)
MCH RBC QN AUTO: 28.4 PG (ref 27–31)
MCHC RBC AUTO-ENTMCNC: 31.4 G/DL (ref 33–37)
MCV RBC AUTO: 90.2 FL (ref 81–99)
MONOCYTES # BLD AUTO: 0.89 10*3/UL (ref 0.2–1.2)
MONOCYTES NFR BLD AUTO: 7.3 % (ref 3–10)
NEUTROPHILS # BLD AUTO: 7.32 10*3/UL (ref 2–8)
NEUTROPHILS NFR BLD AUTO: 59.8 % (ref 30–85)
NITRITE UR QL STRIP: NEGATIVE
NRBC CBCN: 0 % (ref 0–0.7)
OSMOLALITY SERPL CALC.SUM OF ELEC: 290 MOSM/KG (ref 273–304)
PH UR STRIP.AUTO: 5.5 [PH] (ref 5–8)
PHOSPHATE SERPL-MCNC: 3.8 MG/DL (ref 2.4–4.5)
PLATELET # BLD AUTO: 220 10*3/UL (ref 130–400)
PMV BLD AUTO: 12.5 FL (ref 9.4–12.3)
POTASSIUM SERPL-SCNC: 3.9 MMOL/L (ref 3.5–5.3)
PROT UR QL: NEGATIVE MG/DL
PTH-INTACT SERPL-MCNC: 64.5 PG/ML (ref 11.1–79.5)
RBC # BLD AUTO: 3.88 10*6/UL (ref 4.2–5.4)
SODIUM SERPL-SCNC: 136 MMOL/L (ref 135–147)
SP GR UR: <=1.005 (ref 1–1.03)
WBC # BLD AUTO: 12.24 10*3/UL (ref 4.8–10.8)

## 2020-01-07 LAB — 25(OH)D3 SERPL-MCNC: 88.5 NG/ML (ref 30–100)

## 2020-01-13 ENCOUNTER — OFFICE VISIT CONVERTED (OUTPATIENT)
Dept: INTERNAL MEDICINE | Facility: CLINIC | Age: 65
End: 2020-01-13
Attending: NURSE PRACTITIONER

## 2020-01-13 ENCOUNTER — CONVERSION ENCOUNTER (OUTPATIENT)
Dept: INTERNAL MEDICINE | Facility: CLINIC | Age: 65
End: 2020-01-13

## 2020-02-05 ENCOUNTER — OFFICE VISIT (OUTPATIENT)
Dept: BARIATRICS/WEIGHT MGMT | Facility: CLINIC | Age: 65
End: 2020-02-05

## 2020-02-05 VITALS
HEIGHT: 60 IN | BODY MASS INDEX: 36.52 KG/M2 | RESPIRATION RATE: 18 BRPM | WEIGHT: 186 LBS | SYSTOLIC BLOOD PRESSURE: 128 MMHG | TEMPERATURE: 96.7 F | DIASTOLIC BLOOD PRESSURE: 78 MMHG | HEART RATE: 64 BPM

## 2020-02-05 DIAGNOSIS — Z71.3 DIETARY COUNSELING: ICD-10-CM

## 2020-02-05 DIAGNOSIS — E66.9 OBESITY, CLASS II, BMI 35-39.9: Primary | ICD-10-CM

## 2020-02-05 DIAGNOSIS — R53.82 CHRONIC FATIGUE: ICD-10-CM

## 2020-02-05 DIAGNOSIS — Z98.84 S/P LAPAROSCOPIC SLEEVE GASTRECTOMY: ICD-10-CM

## 2020-02-05 PROCEDURE — 99213 OFFICE O/P EST LOW 20 MIN: CPT | Performed by: NURSE PRACTITIONER

## 2020-02-05 RX ORDER — CALCIUM CARBONATE 200(500)MG
1 TABLET,CHEWABLE ORAL DAILY
COMMUNITY

## 2020-02-05 RX ORDER — FERROUS SULFATE 325(65) MG
325 TABLET ORAL
COMMUNITY
End: 2021-08-23

## 2020-02-05 RX ORDER — HYDROXYZINE PAMOATE 25 MG/1
CAPSULE ORAL
COMMUNITY
End: 2021-10-19 | Stop reason: SDUPTHER

## 2020-02-05 NOTE — PROGRESS NOTES
[facility] BARIATRIC [facility] BARIATRIC SURGERY  [facility] [facility] [address]  [facility] [id number] [phone]  [address] [address] [phone]  Dept: [phone]  2/5/2020      Ro Connor.  [id number]  [id number]  1955  female      Chief Complaint   Patient presents with   • Follow-up     3 month sleeve       BH Post-Op Bariatric Surgery:   Ro Connor is status post Laparoscopic Sleeve/HH procedure, performed on 10/30/19     HPI:   Today's weight is 84.4 kg (186 lb) pounds, today's BMI is Body mass index is 36.33 kg/m²., she has a  loss of 10 pounds since the last visit and her weight loss since surgery is 30 pounds. The patient reports a decreased portion size and loss of appetite.      Ro Connor denies n/v/d/regurg/reflux/pain and reports fatigue     Diet and Exercise: Diet history reviewed and discussed with the patient. Weight loss/gains to date discussed with the patient. The patient states they are eating around 40-60 grams of protein per day. She reports eating 3 meals per day, a typical portion size of 1 cup, eating 0-1 snacks per day, drinking 5 or more 8-oz. glasses of water per day, no carbonated beverage consumption and exercising regularly- walking 4 times a week.     Supplements: bariatric MVI, calcium and iron.     Review of Systems   Constitutional: Positive for fatigue.   All other systems reviewed and are negative.      Patient Active Problem List   Diagnosis   • Chronic fatigue   • Ankle swelling   • Essential hypertension   • Hyperlipidemia   • SEVEN (obstructive sleep apnea)   • GERD (gastroesophageal reflux disease)   • Multiple joint pain   • Anxiety   • Renal failure, chronic, stage 4 (severe) (CMS/HCC)   • Other kyphoscoliosis and scoliosis   • Vitamin D deficiency   • Secondary hyperparathyroidism (CMS/HCC)   • Dietary counseling   • S/P laparoscopic sleeve gastrectomy   • Obesity, Class II, BMI 35-39.9        Past Medical History:   Diagnosis Date   • Acid reflux    • Anemia    • Arthritis     OSTEO   • CKD (chronic kidney disease) stage 4, GFR 15-29 ml/min (CMS/HCC)    • DDD (degenerative disc disease), cervical    • DDD (degenerative disc disease), lumbosacral    • DDD (degenerative disc disease), thoracolumbar    • Hyperlipidemia    • Hypertension    • Sleep apnea     CPAP       The following portions of the patient's history were reviewed and updated as appropriate: allergies, current medications, past family history, past medical history, past social history, past surgical history and problem list.    Vitals:    02/05/20 1425   BP: 128/78   Pulse: 64   Resp: 18   Temp: 96.7 °F (35.9 °C)       Physical Exam   Constitutional: She is oriented to person, place, and time. She appears well-developed and well-nourished.   HENT:   Head: Normocephalic and atraumatic.   Eyes: EOM are normal.   Cardiovascular: Normal rate, regular rhythm and normal heart sounds.   Pulmonary/Chest: Effort normal and breath sounds normal.   Abdominal: Soft. Bowel sounds are normal. She exhibits no distension. There is no tenderness.   Musculoskeletal: Normal range of motion.   Neurological: She is alert and oriented to person, place, and time.   Skin: Skin is warm and dry.   Psychiatric: She has a normal mood and affect. Her behavior is normal. Judgment and thought content normal.   Vitals reviewed.      Assessment:   Post-op, the patient is doing well.     Encounter Diagnoses   Name Primary?   • Obesity, Class II, BMI 35-39.9 Yes   • S/P laparoscopic sleeve gastrectomy    • Dietary counseling        Plan:     Encouraged patient to be sure to get plenty of lean protein per day through small frequent meals all with a protein source. Discussed increasing her protein intake and reviewed some plant based protein as she likes more vegetables than meats. Continue working on water. Increase exercise as tolerated. Reviewed weight loss expectation.    Activity restrictions: none.   Recommended patient be sure to get at least 70 grams of protein per day by eating small, frequent meals all with high lean protein choices. Be sure to limit/cut back on daily carbohydrate intake. Discussed with the patient the recommended amount of water per day to intake- half of body weight in ounces. Reviewed vitamin requirements. Be sure to do routine exercise, 150 minutes per week minimum, including both cardio and strength training.     Instructions / Recommendations: dietary counseling recommended, recommended a daily protein intake of  grams, vitamin supplement(s) recommended, recommended exercising at least 150 minutes per week, behavior modifications recommended and instructed to call the office for concerns, questions, or problems.     The patient was instructed to follow up in 3 months.     The patient was counseled regarding. Total time spent face to face was 20 minutes and 15 minutes was spent counseling.

## 2020-02-14 ENCOUNTER — CONVERSION ENCOUNTER (OUTPATIENT)
Dept: INTERNAL MEDICINE | Facility: CLINIC | Age: 65
End: 2020-02-14

## 2020-02-14 ENCOUNTER — OFFICE VISIT CONVERTED (OUTPATIENT)
Dept: INTERNAL MEDICINE | Facility: CLINIC | Age: 65
End: 2020-02-14
Attending: NURSE PRACTITIONER

## 2020-06-10 ENCOUNTER — HOSPITAL ENCOUNTER (OUTPATIENT)
Dept: OTHER | Facility: HOSPITAL | Age: 65
Discharge: HOME | End: 2020-06-10
Attending: INTERNAL MEDICINE

## 2020-06-10 LAB
25(OH)D3 SERPL-MCNC: 82.3 NG/ML (ref 30–100)
ALBUMIN SERPL-MCNC: 4.1 G/DL (ref 3.5–5)
ALBUMIN/GLOB SERPL: 1.2 {RATIO} (ref 1.4–2.6)
ALP SERPL-CCNC: 91 U/L (ref 43–160)
ALT SERPL-CCNC: 61 U/L (ref 10–40)
ANION GAP SERPL CALC-SCNC: 15 MMOL/L (ref 8–19)
APPEARANCE UR: CLEAR
AST SERPL-CCNC: 62 U/L (ref 15–50)
BASOPHILS # BLD AUTO: 0.06 10*3/UL (ref 0–0.2)
BASOPHILS NFR BLD AUTO: 0.6 % (ref 0–3)
BILIRUB SERPL-MCNC: 0.32 MG/DL (ref 0.2–1.3)
BILIRUB UR QL: NEGATIVE
BUN SERPL-MCNC: 38 MG/DL (ref 5–25)
BUN/CREAT SERPL: 19 {RATIO} (ref 6–20)
CALCIUM SERPL-MCNC: 9.5 MG/DL (ref 8.7–10.4)
CHLORIDE SERPL-SCNC: 105 MMOL/L (ref 99–111)
CHOLEST SERPL-MCNC: 141 MG/DL (ref 107–200)
CHOLEST/HDLC SERPL: 2.3 {RATIO} (ref 3–6)
COLOR UR: YELLOW
CONV ABS IMM GRAN: 0.03 10*3/UL (ref 0–0.2)
CONV BACTERIA: NEGATIVE
CONV CO2: 24 MMOL/L (ref 22–32)
CONV COLLECTION SOURCE (UA): ABNORMAL
CONV IMMATURE GRAN: 0.3 % (ref 0–1.8)
CONV TOTAL PROTEIN: 7.4 G/DL (ref 6.3–8.2)
CONV UROBILINOGEN IN URINE BY AUTOMATED TEST STRIP: 0.2 {EHRLICHU}/DL (ref 0.1–1)
CREAT UR-MCNC: 2.02 MG/DL (ref 0.5–0.9)
DEPRECATED RDW RBC AUTO: 48.8 FL (ref 36.4–46.3)
EOSINOPHIL # BLD AUTO: 0.35 10*3/UL (ref 0–0.7)
EOSINOPHIL # BLD AUTO: 3.4 % (ref 0–7)
ERYTHROCYTE [DISTWIDTH] IN BLOOD BY AUTOMATED COUNT: 14.6 % (ref 11.7–14.4)
FERRITIN SERPL-MCNC: 564 NG/ML (ref 10–200)
GFR SERPLBLD BASED ON 1.73 SQ M-ARVRAT: 25 ML/MIN/{1.73_M2}
GLOBULIN UR ELPH-MCNC: 3.3 G/DL (ref 2–3.5)
GLUCOSE SERPL-MCNC: 94 MG/DL (ref 65–99)
GLUCOSE UR QL: NEGATIVE MG/DL
HCT VFR BLD AUTO: 37 % (ref 37–47)
HDLC SERPL-MCNC: 62 MG/DL (ref 40–60)
HGB BLD-MCNC: 11.8 G/DL (ref 12–16)
HGB UR QL STRIP: NEGATIVE
IRON SATN MFR SERPL: 23 % (ref 20–55)
IRON SERPL-MCNC: 59 UG/DL (ref 60–170)
KETONES UR QL STRIP: NEGATIVE MG/DL
LDLC SERPL CALC-MCNC: 63 MG/DL (ref 70–100)
LEUKOCYTE ESTERASE UR QL STRIP: ABNORMAL
LYMPHOCYTES # BLD AUTO: 2.83 10*3/UL (ref 1–5)
LYMPHOCYTES NFR BLD AUTO: 27.6 % (ref 20–45)
MCH RBC QN AUTO: 28.9 PG (ref 27–31)
MCHC RBC AUTO-ENTMCNC: 31.9 G/DL (ref 33–37)
MCV RBC AUTO: 90.7 FL (ref 81–99)
MONOCYTES # BLD AUTO: 0.86 10*3/UL (ref 0.2–1.2)
MONOCYTES NFR BLD AUTO: 8.4 % (ref 3–10)
NEUTROPHILS # BLD AUTO: 6.13 10*3/UL (ref 2–8)
NEUTROPHILS NFR BLD AUTO: 59.7 % (ref 30–85)
NITRITE UR QL STRIP: NEGATIVE
NRBC CBCN: 0 % (ref 0–0.7)
OSMOLALITY SERPL CALC.SUM OF ELEC: 299 MOSM/KG (ref 273–304)
PH UR STRIP.AUTO: 6 [PH] (ref 5–8)
PLATELET # BLD AUTO: 244 10*3/UL (ref 130–400)
PMV BLD AUTO: 11.1 FL (ref 9.4–12.3)
POTASSIUM SERPL-SCNC: 4.1 MMOL/L (ref 3.5–5.3)
PROT UR QL: NEGATIVE MG/DL
RBC # BLD AUTO: 4.08 10*6/UL (ref 4.2–5.4)
RBC #/AREA URNS HPF: ABNORMAL /[HPF]
SODIUM SERPL-SCNC: 140 MMOL/L (ref 135–147)
SP GR UR: 1.01 (ref 1–1.03)
T4 FREE SERPL-MCNC: 1.2 NG/DL (ref 0.9–1.8)
TIBC SERPL-MCNC: 256 UG/DL (ref 245–450)
TRANSFERRIN SERPL-MCNC: 179 MG/DL (ref 250–380)
TRIGL SERPL-MCNC: 79 MG/DL (ref 40–150)
TSH SERPL-ACNC: 0.64 M[IU]/L (ref 0.27–4.2)
VLDLC SERPL-MCNC: 16 MG/DL (ref 5–37)
WBC # BLD AUTO: 10.26 10*3/UL (ref 4.8–10.8)
WBC #/AREA URNS HPF: ABNORMAL /[HPF]

## 2020-06-15 ENCOUNTER — TELEPHONE CONVERTED (OUTPATIENT)
Dept: INTERNAL MEDICINE | Facility: CLINIC | Age: 65
End: 2020-06-15
Attending: NURSE PRACTITIONER

## 2020-07-02 ENCOUNTER — OFFICE VISIT (OUTPATIENT)
Dept: BARIATRICS/WEIGHT MGMT | Facility: CLINIC | Age: 65
End: 2020-07-02

## 2020-07-02 VITALS
HEIGHT: 60 IN | RESPIRATION RATE: 18 BRPM | BODY MASS INDEX: 33.38 KG/M2 | HEART RATE: 68 BPM | TEMPERATURE: 98 F | WEIGHT: 170 LBS | SYSTOLIC BLOOD PRESSURE: 112 MMHG | DIASTOLIC BLOOD PRESSURE: 77 MMHG

## 2020-07-02 DIAGNOSIS — E66.9 OBESITY, CLASS I, BMI 30-34.9: Primary | ICD-10-CM

## 2020-07-02 DIAGNOSIS — Z98.84 S/P LAPAROSCOPIC SLEEVE GASTRECTOMY: ICD-10-CM

## 2020-07-02 DIAGNOSIS — Z71.3 DIETARY COUNSELING: ICD-10-CM

## 2020-07-02 PROBLEM — E66.01 MORBIDLY OBESE: Status: ACTIVE | Noted: 2020-07-02

## 2020-07-02 PROBLEM — E66.812 OBESITY, CLASS II, BMI 35-39.9: Status: RESOLVED | Noted: 2019-12-05 | Resolved: 2020-07-02

## 2020-07-02 PROBLEM — E66.811 OBESITY, CLASS I, BMI 30-34.9: Status: ACTIVE | Noted: 2020-07-02

## 2020-07-02 PROBLEM — E44.1 MILD PROTEIN-CALORIE MALNUTRITION: Status: RESOLVED | Noted: 2020-07-02 | Resolved: 2020-07-02

## 2020-07-02 PROBLEM — E44.1 MILD PROTEIN-CALORIE MALNUTRITION (HCC): Status: ACTIVE | Noted: 2020-07-02

## 2020-07-02 PROBLEM — E66.01 MORBIDLY OBESE: Status: RESOLVED | Noted: 2020-07-02 | Resolved: 2020-07-02

## 2020-07-02 PROCEDURE — 99213 OFFICE O/P EST LOW 20 MIN: CPT | Performed by: NURSE PRACTITIONER

## 2020-07-02 RX ORDER — TRAZODONE HYDROCHLORIDE 100 MG/1
100 TABLET ORAL NIGHTLY
COMMUNITY
Start: 2020-05-01 | End: 2022-02-21 | Stop reason: SDUPTHER

## 2020-07-02 RX ORDER — CARVEDILOL 3.12 MG/1
TABLET ORAL
COMMUNITY
Start: 2020-04-07 | End: 2021-10-19 | Stop reason: SDUPTHER

## 2020-07-02 NOTE — PROGRESS NOTES
[facility] BARIATRIC [facility] BARIATRIC SURGERY  [address] [facility] [address]  [facility] [id number] [phone]  [address] [facility] [address]  [facility] [address] [phone]  Dept: [phone]  7/2/2020      Ro Connor.  [id number]  [id number]  1955  female      Chief Complaint   Patient presents with   • Follow-up     6 month sleeve       BH Post-Op Bariatric Surgery:   Ro Connor is status post Laparoscopic Sleeve procedure, performed on 10/30/19     HPI:   Today's weight is 77.1 kg (170 lb) pounds, today's BMI is Body mass index is 33.2 kg/m²., she has a  loss of 16 pounds since the last visit and her weight loss since surgery is 46 pounds. The patient reports a decreased portion size and loss of appetite.      Ro Connor denies n/v/d/regurg/reflux/pain and reports alopecia. Says she is struggling with her protein intake because she doesn't really like meat and is wanting to know how to get more protein in through plant based     Diet and Exercise: Diet history reviewed and discussed with the patient. Weight loss/gains to date discussed with the patient. The patient states they are eating 55 grams of protein per day. She reports eating 3 meals per day, a typical portion size of 1 cup, eating 1 snacks per day, drinking 5 or more 8-oz. glasses of water per day, no carbonated beverage consumption and exercising regularly- walking a couple days a week.     Supplements: BA MVI and tums for calcium.     Review of Systems   Constitutional:        Alopecia   All other systems reviewed and are negative.      Patient Active Problem List   Diagnosis   • Chronic fatigue   • Ankle swelling   • Essential hypertension   • Hyperlipidemia   • SEVEN (obstructive sleep apnea)   • GERD (gastroesophageal reflux disease)   • Multiple joint pain   • Anxiety   • Renal failure, chronic, stage 4 (severe) (CMS/HCC)   • Other kyphoscoliosis and scoliosis   • Vitamin D deficiency   •  Secondary hyperparathyroidism (CMS/HCC)   • Dietary counseling   • S/P laparoscopic sleeve gastrectomy   • Obesity, Class I, BMI 30-34.9       Past Medical History:   Diagnosis Date   • Acid reflux    • Anemia    • Arthritis     OSTEO   • CKD (chronic kidney disease) stage 4, GFR 15-29 ml/min (CMS/HCC)    • DDD (degenerative disc disease), cervical    • DDD (degenerative disc disease), lumbosacral    • DDD (degenerative disc disease), thoracolumbar    • Hyperlipidemia    • Hypertension    • Sleep apnea     CPAP       The following portions of the patient's history were reviewed and updated as appropriate: allergies, current medications, past family history, past medical history, past social history, past surgical history and problem list.    Vitals:    07/02/20 1424   BP: 112/77   Pulse: 68   Resp: 18   Temp: 98 °F (36.7 °C)       Physical Exam   Constitutional: She is oriented to person, place, and time. She appears well-developed and well-nourished.   HENT:   Head: Normocephalic and atraumatic.   Eyes: EOM are normal.   Cardiovascular: Normal rate, regular rhythm and normal heart sounds.   Pulmonary/Chest: Effort normal and breath sounds normal.   Abdominal: Soft. Bowel sounds are normal. She exhibits no distension. There is no tenderness.   Musculoskeletal: Normal range of motion.   Neurological: She is alert and oriented to person, place, and time.   Skin: Skin is warm and dry.   Psychiatric: She has a normal mood and affect. Her behavior is normal. Judgment and thought content normal.   Vitals reviewed.      Assessment:   Post-op, the patient is doing well.     Encounter Diagnoses   Name Primary?   • Obesity, Class I, BMI 30-34.9 Yes   • S/P laparoscopic sleeve gastrectomy    • Dietary counseling        Plan:     Encouraged patient to be sure to get plenty of lean protein per day through small frequent meals all with a protein source. Continue with healthy dietary choices, water intake and vitamins. Her  nephrologist just checked her labs. Increase exercise as tolerated. Will meet with dietician today to discuss protein.   Activity restrictions: none.   Recommended patient be sure to get at least 70 grams of protein per day by eating small, frequent meals all with high lean protein choices. Be sure to limit/cut back on daily carbohydrate intake. Discussed with the patient the recommended amount of water per day to intake- half of body weight in ounces. Reviewed vitamin requirements. Be sure to do routine exercise, 150 minutes per week minimum, including both cardio and strength training.     Instructions / Recommendations: dietary counseling recommended, recommended a daily protein intake of  grams, vitamin supplement(s) recommended, recommended exercising at least 150 minutes per week, behavior modifications recommended and instructed to call the office for concerns, questions, or problems.     The patient was instructed to follow up in 3 months.     The patient was counseled regarding. Total time spent face to face was 20 minutes and 15 minutes was spent counseling.

## 2020-07-07 NOTE — PROGRESS NOTES
Provided patient with information on plant-based protein. She is getting in about 45g protein. Renal MD agreed on protein goal of 60g per CKD 4. She's getting in 90oz fluid, taking BA MVI with iron in addition to extra iron and tums for iron per renal. Provided desired information on plant-based protein and the potential benefit of plant vs animal protein for kidney function.

## 2020-07-13 ENCOUNTER — HOSPITAL ENCOUNTER (OUTPATIENT)
Dept: URGENT CARE | Facility: CLINIC | Age: 65
Discharge: HOME | End: 2020-07-13
Attending: NURSE PRACTITIONER

## 2020-07-16 LAB — SARS-COV-2 RNA SPEC QL NAA+PROBE: NOT DETECTED

## 2020-10-02 ENCOUNTER — OFFICE VISIT (OUTPATIENT)
Dept: BARIATRICS/WEIGHT MGMT | Facility: CLINIC | Age: 65
End: 2020-10-02

## 2020-10-02 ENCOUNTER — RESULTS ENCOUNTER (OUTPATIENT)
Dept: BARIATRICS/WEIGHT MGMT | Facility: CLINIC | Age: 65
End: 2020-10-02

## 2020-10-02 VITALS
HEART RATE: 70 BPM | TEMPERATURE: 98.2 F | BODY MASS INDEX: 31.61 KG/M2 | WEIGHT: 161 LBS | RESPIRATION RATE: 18 BRPM | SYSTOLIC BLOOD PRESSURE: 126 MMHG | DIASTOLIC BLOOD PRESSURE: 81 MMHG | HEIGHT: 60 IN

## 2020-10-02 DIAGNOSIS — E78.5 HYPERLIPIDEMIA, UNSPECIFIED HYPERLIPIDEMIA TYPE: ICD-10-CM

## 2020-10-02 DIAGNOSIS — G47.33 OSA (OBSTRUCTIVE SLEEP APNEA): ICD-10-CM

## 2020-10-02 DIAGNOSIS — E55.9 VITAMIN D DEFICIENCY: ICD-10-CM

## 2020-10-02 DIAGNOSIS — E66.9 OBESITY, CLASS I, BMI 30-34.9: Primary | ICD-10-CM

## 2020-10-02 DIAGNOSIS — I10 ESSENTIAL HYPERTENSION: ICD-10-CM

## 2020-10-02 DIAGNOSIS — Z98.84 S/P LAPAROSCOPIC SLEEVE GASTRECTOMY: ICD-10-CM

## 2020-10-02 DIAGNOSIS — K21.9 GASTROESOPHAGEAL REFLUX DISEASE WITHOUT ESOPHAGITIS: ICD-10-CM

## 2020-10-02 DIAGNOSIS — E66.9 OBESITY, CLASS I, BMI 30-34.9: ICD-10-CM

## 2020-10-02 DIAGNOSIS — M25.473 ANKLE SWELLING, UNSPECIFIED LATERALITY: ICD-10-CM

## 2020-10-02 PROCEDURE — 99213 OFFICE O/P EST LOW 20 MIN: CPT | Performed by: NURSE PRACTITIONER

## 2020-10-02 NOTE — PROGRESS NOTES
[facility] BARIATRIC [facility] BARIATRIC SURGERY  [address] [facility] [address]  [facility] [address]  [phone]  [address] [id number] [phone]  Dept: [phone]  10/2/2020      Ro Connor.  [id number]  [id number]  1955  female      Chief Complaint   Patient presents with   • Follow-up     1 year sleeve       BH Post-Op Bariatric Surgery:   Ro Connor is status post Laparoscopic Sleeve procedure, performed on 10/30/19     HPI:   Today's weight is 73 kg (161 lb) pounds, today's BMI is Body mass index is 31.44 kg/m²., she has a  loss of 9 pounds since the last visit and her weight loss since surgery is 55 pounds. The patient reports a decreased portion size and loss of appetite.      Ro Connor denies nausea vomiting reflux regurgitation dysphagia and reports that she is doing well     Diet and Exercise: Diet history reviewed and discussed with the patient. Weight loss/gains to date discussed with the patient. The patient states they are eating 60-70 grams of protein per day. She reports eating 3 meals per day, a typical portion size of 1/2 cup, eating 1 snacks per day, drinking 5-6 or more 8-oz. glasses of water per day, no carbonated beverage consumption and exercising regularly- she has been getting her walking in. She does have a treadmill.     Supplements: BA MTV OS, ferrous sulfate, calcium.     Review of Systems   Constitutional: Positive for appetite change. Negative for fatigue and unexpected weight change.   HENT: Negative.    Eyes: Negative.    Respiratory: Negative.    Cardiovascular: Negative.  Negative for leg swelling.   Gastrointestinal: Negative for abdominal distention, abdominal pain, constipation, diarrhea, nausea and vomiting.   Genitourinary: Negative for difficulty urinating, frequency and urgency.   Musculoskeletal: Negative for back pain.   Skin: Negative.    Psychiatric/Behavioral: Negative.    All other  systems reviewed and are negative.      Patient Active Problem List   Diagnosis   • Chronic fatigue   • Ankle swelling   • Essential hypertension   • Hyperlipidemia   • SEVEN (obstructive sleep apnea)   • GERD (gastroesophageal reflux disease)   • Multiple joint pain   • Anxiety   • Renal failure, chronic, stage 4 (severe) (CMS/[facility])   • Other kyphoscoliosis and scoliosis   • Vitamin D deficiency   • Secondary hyperparathyroidism (CMS/[facility])   • Dietary counseling   • S/P laparoscopic sleeve gastrectomy   • Obesity, Class I, BMI 30-34.9       Past Medical History:   Diagnosis Date   • Acid reflux    • Anemia    • Arthritis     OSTEO   • CKD (chronic kidney disease) stage 4, GFR 15-29 ml/min (CMS/[facility])    • DDD (degenerative disc disease), cervical    • DDD (degenerative disc disease), lumbosacral    • DDD (degenerative disc disease), thoracolumbar    • Hyperlipidemia    • Hypertension    • Sleep apnea     CPAP       The following portions of the patient's history were reviewed and updated as appropriate: allergies, current medications, past family history, past medical history, past social history, past surgical history and problem list.    Vitals:    10/02/20 1435   BP: 126/81   Pulse: 70   Resp: 18   Temp: 98.2 °F (36.8 °C)       Physical Exam  Vitals signs and nursing note reviewed.   Constitutional:       Appearance: She is well-developed.   Neck:      Thyroid: No thyromegaly.   Cardiovascular:      Rate and Rhythm: Normal rate and regular rhythm.      Heart sounds: Normal heart sounds.   Pulmonary:      Effort: Pulmonary effort is normal. No respiratory distress.      Breath sounds: Normal breath sounds. No wheezing.   Abdominal:      General: Bowel sounds are normal. There is no distension.      Palpations: Abdomen is soft.      Tenderness: There is no abdominal tenderness. There is no guarding.      Hernia: No hernia is present.   Musculoskeletal:         General: No tenderness.   Skin:     General: Skin is warm  and dry.      Findings: No erythema or rash.   Neurological:      Mental Status: She is alert.   Psychiatric:         Behavior: Behavior normal.         Assessment:   Post-op, the patient is doing well.     Encounter Diagnoses   Name Primary?   • Obesity, Class I, BMI 30-34.9 Yes   • S/P laparoscopic sleeve gastrectomy    • Ankle swelling, unspecified laterality    • Essential hypertension    • Hyperlipidemia, unspecified hyperlipidemia type    • SEVEN (obstructive sleep apnea)    • Vitamin D deficiency    • Gastroesophageal reflux disease without esophagitis        Plan:   Encouraged patient to be sure to get plenty of lean protein per day through small frequent meals all with a protein source.   Activity restrictions: none.   Recommended patient be sure to get at least 70 grams of protein per day by eating small, frequent meals all with high lean protein choices. Be sure to limit/cut back on daily carbohydrate intake. Discussed with the patient the recommended amount of water per day to intake- half of body weight in ounces. Reviewed vitamin requirements. Be sure to do routine exercise, 150 minutes per week minimum, including both cardio and strength training.     Instructions / Recommendations: dietary counseling recommended, recommended a daily protein intake of  grams, vitamin supplement(s) recommended, recommended exercising at least 150 minutes per week, behavior modifications recommended and instructed to call the office for concerns, questions, or problems.     The patient was instructed to follow up in 3 months .     Total time spent face to face was 20 minutes and 15 minutes was spent counseling.

## 2020-10-05 ENCOUNTER — OFFICE VISIT CONVERTED (OUTPATIENT)
Dept: INTERNAL MEDICINE | Facility: CLINIC | Age: 65
End: 2020-10-05
Attending: NURSE PRACTITIONER

## 2020-10-05 ENCOUNTER — HOSPITAL ENCOUNTER (OUTPATIENT)
Dept: OTHER | Facility: HOSPITAL | Age: 65
Discharge: HOME | End: 2020-10-05
Attending: NURSE PRACTITIONER

## 2020-10-05 LAB
ALBUMIN SERPL-MCNC: 4.1 G/DL (ref 3.5–5)
ALBUMIN/GLOB SERPL: 1.4 {RATIO} (ref 1.4–2.6)
ALP SERPL-CCNC: 86 U/L (ref 43–160)
ALT SERPL-CCNC: 58 U/L (ref 10–40)
ANION GAP SERPL CALC-SCNC: 17 MMOL/L (ref 8–19)
AST SERPL-CCNC: 55 U/L (ref 15–50)
BASOPHILS # BLD AUTO: 0.08 10*3/UL (ref 0–0.2)
BASOPHILS NFR BLD AUTO: 0.7 % (ref 0–3)
BILIRUB SERPL-MCNC: 0.25 MG/DL (ref 0.2–1.3)
BUN SERPL-MCNC: 54 MG/DL (ref 5–25)
BUN/CREAT SERPL: 26 {RATIO} (ref 6–20)
CALCIUM SERPL-MCNC: 9.5 MG/DL (ref 8.7–10.4)
CHLORIDE SERPL-SCNC: 106 MMOL/L (ref 99–111)
CHOLEST SERPL-MCNC: 153 MG/DL (ref 107–200)
CHOLEST/HDLC SERPL: 2.4 {RATIO} (ref 3–6)
CONV ABS IMM GRAN: 0.03 10*3/UL (ref 0–0.2)
CONV CO2: 22 MMOL/L (ref 22–32)
CONV IMMATURE GRAN: 0.3 % (ref 0–1.8)
CONV TOTAL PROTEIN: 7.1 G/DL (ref 6.3–8.2)
CREAT UR-MCNC: 2.07 MG/DL (ref 0.5–0.9)
DEPRECATED RDW RBC AUTO: 47.9 FL (ref 36.4–46.3)
EOSINOPHIL # BLD AUTO: 0.37 10*3/UL (ref 0–0.7)
EOSINOPHIL # BLD AUTO: 3.4 % (ref 0–7)
ERYTHROCYTE [DISTWIDTH] IN BLOOD BY AUTOMATED COUNT: 14.1 % (ref 11.7–14.4)
FOLATE SERPL-MCNC: >20 NG/ML (ref 4.8–20)
GFR SERPLBLD BASED ON 1.73 SQ M-ARVRAT: 24 ML/MIN/{1.73_M2}
GLOBULIN UR ELPH-MCNC: 3 G/DL (ref 2–3.5)
GLUCOSE SERPL-MCNC: 106 MG/DL (ref 65–99)
HCT VFR BLD AUTO: 37.2 % (ref 37–47)
HDLC SERPL-MCNC: 63 MG/DL (ref 40–60)
HGB BLD-MCNC: 11.9 G/DL (ref 12–16)
LDLC SERPL CALC-MCNC: 69 MG/DL (ref 70–100)
LYMPHOCYTES # BLD AUTO: 3.28 10*3/UL (ref 1–5)
LYMPHOCYTES NFR BLD AUTO: 29.8 % (ref 20–45)
MCH RBC QN AUTO: 29.5 PG (ref 27–31)
MCHC RBC AUTO-ENTMCNC: 32 G/DL (ref 33–37)
MCV RBC AUTO: 92.3 FL (ref 81–99)
MONOCYTES # BLD AUTO: 0.83 10*3/UL (ref 0.2–1.2)
MONOCYTES NFR BLD AUTO: 7.6 % (ref 3–10)
NEUTROPHILS # BLD AUTO: 6.4 10*3/UL (ref 2–8)
NEUTROPHILS NFR BLD AUTO: 58.2 % (ref 30–85)
NRBC CBCN: 0 % (ref 0–0.7)
OSMOLALITY SERPL CALC.SUM OF ELEC: 307 MOSM/KG (ref 273–304)
PLATELET # BLD AUTO: 264 10*3/UL (ref 130–400)
PMV BLD AUTO: 11.7 FL (ref 9.4–12.3)
POTASSIUM SERPL-SCNC: 3.8 MMOL/L (ref 3.5–5.3)
PREALB SERPL-MCNC: 21.6 MG/DL (ref 20–40)
RBC # BLD AUTO: 4.03 10*6/UL (ref 4.2–5.4)
SODIUM SERPL-SCNC: 141 MMOL/L (ref 135–147)
T4 FREE SERPL-MCNC: 1.1 NG/DL (ref 0.9–1.8)
TRIGL SERPL-MCNC: 105 MG/DL (ref 40–150)
TSH SERPL-ACNC: 0.22 M[IU]/L (ref 0.27–4.2)
VLDLC SERPL-MCNC: 21 MG/DL (ref 5–37)
WBC # BLD AUTO: 10.99 10*3/UL (ref 4.8–10.8)

## 2020-10-06 LAB
25(OH)D3 SERPL-MCNC: 98.5 NG/ML (ref 30–100)
FERRITIN SERPL-MCNC: 562 NG/ML (ref 10–200)
IRON SATN MFR SERPL: 20 % (ref 20–55)
IRON SERPL-MCNC: 48 UG/DL (ref 60–170)
TIBC SERPL-MCNC: 239 UG/DL (ref 245–450)
TRANSFERRIN SERPL-MCNC: 167 MG/DL (ref 250–380)

## 2020-10-11 LAB — CONV VITAMIN B-1 (THIAMINE): 236.3 NMOL/L (ref 66.5–200)

## 2020-10-12 LAB — METHYLMALONATE SERPL-SCNC: 293 NMOL/L (ref 0–378)

## 2020-10-26 ENCOUNTER — TELEPHONE (OUTPATIENT)
Dept: BARIATRICS/WEIGHT MGMT | Facility: CLINIC | Age: 65
End: 2020-10-26

## 2020-11-25 ENCOUNTER — HOSPITAL ENCOUNTER (OUTPATIENT)
Dept: MAMMOGRAPHY | Facility: HOSPITAL | Age: 65
Discharge: HOME | End: 2020-11-25
Attending: NURSE PRACTITIONER

## 2021-01-05 ENCOUNTER — OFFICE VISIT CONVERTED (OUTPATIENT)
Dept: INTERNAL MEDICINE | Facility: CLINIC | Age: 66
End: 2021-01-05
Attending: NURSE PRACTITIONER

## 2021-01-05 ENCOUNTER — HOSPITAL ENCOUNTER (OUTPATIENT)
Dept: OTHER | Facility: HOSPITAL | Age: 66
Discharge: HOME | End: 2021-01-05
Attending: NURSE PRACTITIONER

## 2021-01-05 ENCOUNTER — CONVERSION ENCOUNTER (OUTPATIENT)
Dept: INTERNAL MEDICINE | Facility: CLINIC | Age: 66
End: 2021-01-05

## 2021-01-05 LAB
ALBUMIN SERPL-MCNC: 4.3 G/DL (ref 3.5–5)
ALBUMIN/GLOB SERPL: 1.3 {RATIO} (ref 1.4–2.6)
ALP SERPL-CCNC: 109 U/L (ref 43–160)
ALT SERPL-CCNC: 78 U/L (ref 10–40)
ANION GAP SERPL CALC-SCNC: 16 MMOL/L (ref 8–19)
AST SERPL-CCNC: 71 U/L (ref 15–50)
BASOPHILS # BLD AUTO: 0.09 10*3/UL (ref 0–0.2)
BASOPHILS NFR BLD AUTO: 1 % (ref 0–3)
BILIRUB SERPL-MCNC: 0.34 MG/DL (ref 0.2–1.3)
BUN SERPL-MCNC: 53 MG/DL (ref 5–25)
BUN/CREAT SERPL: 22 {RATIO} (ref 6–20)
CALCIUM SERPL-MCNC: 9.6 MG/DL (ref 8.7–10.4)
CHLORIDE SERPL-SCNC: 103 MMOL/L (ref 99–111)
CHOLEST SERPL-MCNC: 172 MG/DL (ref 107–200)
CHOLEST/HDLC SERPL: 2.4 {RATIO} (ref 3–6)
CONV ABS IMM GRAN: 0.02 10*3/UL (ref 0–0.2)
CONV CO2: 25 MMOL/L (ref 22–32)
CONV IMMATURE GRAN: 0.2 % (ref 0–1.8)
CONV TOTAL PROTEIN: 7.5 G/DL (ref 6.3–8.2)
CREAT UR-MCNC: 2.39 MG/DL (ref 0.5–0.9)
DEPRECATED RDW RBC AUTO: 47.4 FL (ref 36.4–46.3)
EOSINOPHIL # BLD AUTO: 0.37 10*3/UL (ref 0–0.7)
EOSINOPHIL # BLD AUTO: 3.9 % (ref 0–7)
ERYTHROCYTE [DISTWIDTH] IN BLOOD BY AUTOMATED COUNT: 14 % (ref 11.7–14.4)
GFR SERPLBLD BASED ON 1.73 SQ M-ARVRAT: 21 ML/MIN/{1.73_M2}
GLOBULIN UR ELPH-MCNC: 3.2 G/DL (ref 2–3.5)
GLUCOSE SERPL-MCNC: 80 MG/DL (ref 65–99)
HCT VFR BLD AUTO: 37.9 % (ref 37–47)
HDLC SERPL-MCNC: 73 MG/DL (ref 40–60)
HGB BLD-MCNC: 12 G/DL (ref 12–16)
LDLC SERPL CALC-MCNC: 82 MG/DL (ref 70–100)
LYMPHOCYTES # BLD AUTO: 3.74 10*3/UL (ref 1–5)
LYMPHOCYTES NFR BLD AUTO: 39.7 % (ref 20–45)
MCH RBC QN AUTO: 29.4 PG (ref 27–31)
MCHC RBC AUTO-ENTMCNC: 31.7 G/DL (ref 33–37)
MCV RBC AUTO: 92.9 FL (ref 81–99)
MONOCYTES # BLD AUTO: 0.71 10*3/UL (ref 0.2–1.2)
MONOCYTES NFR BLD AUTO: 7.5 % (ref 3–10)
NEUTROPHILS # BLD AUTO: 4.5 10*3/UL (ref 2–8)
NEUTROPHILS NFR BLD AUTO: 47.7 % (ref 30–85)
NRBC CBCN: 0 % (ref 0–0.7)
OSMOLALITY SERPL CALC.SUM OF ELEC: 303 MOSM/KG (ref 273–304)
PLATELET # BLD AUTO: 233 10*3/UL (ref 130–400)
PMV BLD AUTO: 11.9 FL (ref 9.4–12.3)
POTASSIUM SERPL-SCNC: 4.4 MMOL/L (ref 3.5–5.3)
RBC # BLD AUTO: 4.08 10*6/UL (ref 4.2–5.4)
SODIUM SERPL-SCNC: 140 MMOL/L (ref 135–147)
T4 FREE SERPL-MCNC: 1 NG/DL (ref 0.9–1.8)
TRIGL SERPL-MCNC: 87 MG/DL (ref 40–150)
TSH SERPL-ACNC: 0.5 M[IU]/L (ref 0.27–4.2)
VLDLC SERPL-MCNC: 17 MG/DL (ref 5–37)
WBC # BLD AUTO: 9.43 10*3/UL (ref 4.8–10.8)

## 2021-01-06 LAB
FERRITIN SERPL-MCNC: 581 NG/ML (ref 10–200)
IRON SATN MFR SERPL: 28 % (ref 20–55)
IRON SERPL-MCNC: 83 UG/DL (ref 60–170)
TIBC SERPL-MCNC: 300 UG/DL (ref 245–450)
TRANSFERRIN SERPL-MCNC: 210 MG/DL (ref 250–380)

## 2021-01-08 ENCOUNTER — HOSPITAL ENCOUNTER (OUTPATIENT)
Dept: ULTRASOUND IMAGING | Facility: HOSPITAL | Age: 66
Discharge: HOME | End: 2021-01-08
Attending: NURSE PRACTITIONER

## 2021-04-05 ENCOUNTER — OFFICE VISIT CONVERTED (OUTPATIENT)
Dept: INTERNAL MEDICINE | Facility: CLINIC | Age: 66
End: 2021-04-05
Attending: NURSE PRACTITIONER

## 2021-04-05 ENCOUNTER — HOSPITAL ENCOUNTER (OUTPATIENT)
Dept: OTHER | Facility: HOSPITAL | Age: 66
Discharge: HOME | End: 2021-04-05
Attending: NURSE PRACTITIONER

## 2021-05-13 NOTE — PROGRESS NOTES
Progress Note      Patient Name: Ro Blum   Patient ID: [id number]   Sex: Female   YOB: 1955    Primary Care Provider: Maria Elena MERCHANT   Referring Provider: Maria Elena MERCHANT    Visit Date: October 5, 2020    Provider: MAYUR Nelson   Location: [facility]   Location Address: [address] [place] [address]  [id number]   Location Phone: [phone]          Chief Complaint  · follow up, no particular concerns  · Would like flu shot today      History Of Present Illness  Ro Blum is a 65 year old /White female who presents for evaluation and treatment of:      Depression/anxiety-  Well controlled with escitalopram. Denies SI/HI.    GERD-  Well controlled with pantoprazole.    HLD-  Managed with statin. Well tolerated, denies leg cramping. Most recent LDL 63.    Hyperthyroid-  Previously managed with methimazole, has been off of medication x 2 years. Last thyroid ultrasound 7/2020.    CKD-  Managed by Dr. Baum. Improved GFR with bariatric surgery, patient was removed from transplant list. Total weight loss 97 pounds. Most recent GFR 25.     HTN-  Managed with Coreg and Lasix. Reports home blood pressure readings 120s/80s. Denies chest pain, leg swelling, blurry vision, headache.     Vaginal dryness-  Requesting topical estrogen cream for vaginal dryness. States she has tried all of the OTC vaginal creams for many years without relief. States she is very uncomfortable and often unable to have sexual intercourse because of this.     Due for mammogram and DEXA, rescheduled for 11/2020.  Due for influenza vaccination.  Due for labs, would like labs drawn in clinic requested by bariatric surgeon.       Past Medical History  Disease Name Date Onset Notes   Bariatric surgery status 11/13/2019 --    Gastric reflux --  --    Hiatal hernia with gastroesophageal reflux 10/14/2019 --    High blood pressure --  --    Kidney Disease --  --     Sleep apnea --  --    Stage 4 chronic kidney disease 10/14/2019 --    Status post laparoscopic sleeve gastrectomy 11/13/2019 --    Thyroid Problems --  --          Past Surgical History  Procedure Name Date Notes   Colonoscopy 2013/2019 --    EGD 2013 --    Gastrectomy, laparoscopic --  --    Hernia Repair --  --          Medication List  Name Date Started Instructions   Coreg 3.125 mg oral tablet 10/05/2020 take 1 tablet (3.125 mg) by oral route 2 times per day with food   escitalopram oxalate 20 mg oral tablet 10/05/2020 TAKE 1 TABLET EVERY DAY   ferrous sulfate 325 mg (65 mg iron) oral tablet 10/05/2020 TAKE 1 TABLET EVERY DAY   Fiber Gummies 2 gram oral tablet,chewable  --    Folbee 2.5-25-1 mg oral tablet  take 1 tablet by oral route once daily   furosemide 20 mg oral tablet 10/05/2020 take 1 tablet (20 mg) by oral route once daily for 90 days   hydrocodone-acetaminophen 7.5-325 mg oral tablet  3 times per day as needed   hydroxyzine HCl 50 mg oral tablet 10/05/2020 take 1 tablet by oral route 2 times a day for 90 days   melatonin 5 mg oral tablet  take 1 tablet by oral route once a day (at bedtime)   simvastatin 20 mg oral tablet 10/05/2020 TAKE 1 TABLET EVERY EVENING   trazodone 100 mg oral tablet 10/05/2020 TAKE 1 TABLET AT BEDTIME   Ultra Choice Multivit with Min oral  --          Allergy List  Allergen Name Date Reaction Notes   NO KNOWN DRUG ALLERGIES --  --  --        Allergies Reconciled  Family Medical History  Disease Name Relative/Age Notes   Chronic Obstructive Pulmonary Disease  --    Heart Disease  --    - No Family History of Colorectal Cancer  --    Brain tumor  --          Social History  Finding Status Start/Stop Quantity Notes   Alcohol Current some day --/-- seldom --    Tobacco Never --/-- --  --          Immunizations  NameDate Admin Mfg Trade Name Lot Number Route Inj VIS Given VIS Publication   [id number]/05/2020 PMC FLUZONE-HIGH DOSE [id number] IM LD 10/05/2020    Comments: Patient  tolerated well and left office in stable condition.   Prevnar 1310/14/2019 [facility] PREVNAR 13 [id number] IM LD 10/14/2019    Comments: Pt tolerated well and left office in stable condition         Review of Systems  · Constitutional  o Admits  o : weight loss  o Denies  o : fever, fatigue, weight gain  · Cardiovascular  o Denies  o : lower extremity edema, chest pressure, palpitations  · Respiratory  o Denies  o : shortness of breath, wheezing, cough, dyspnea on exertion  · Gastrointestinal  o Denies  o : nausea, vomiting, diarrhea, constipation, abdominal pain  · Genitourinary  o Admits  o : dyspareunia, vaginal dryness  · Psychiatric  o Admits  o : anxiety, depression  o Denies  o : suicidal ideation, homicidal ideation      Vitals  Date Time BP Position Site L\R Cuff Size HR RR TEMP (F) WT  HT  BMI kg/m2 BSA m2 O2 Sat FR L/min FiO2 HC       01/13/2020 03:34 /80 Sitting    71 - R 16 98.4 189lbs 4oz 5'   36.96 1.91 98 %  21%    02/14/2020 02:51 /78 Sitting    65 - R 16 97.6 185lbs 6oz 5'   36.2 1.89 100 %  21%    10/05/2020 02:44 /62 Sitting    81 - R  98 161lbs 2oz 5'   31.47 1.76 96 %  21%          Physical Examination  · Constitutional  o Appearance  o : no acute distress, well-nourished  · Head and Face  o Head  o :   § Inspection  § : atraumatic, normocephalic  · Eyes  o Eyes  o : extraocular movements intact, no scleral icterus, no conjunctival injection  · Ears, Nose, Mouth and Throat  o Ears  o :   § External Ears  § : normal  o Nose  o :   § Intranasal Exam  § : nares patent  o Oral Cavity  o :   § Oral Mucosa  § : moist mucous membranes  · Neck  o Thyroid  o : gland size normal, nontender, no nodules or masses present on palpation, symmetric  · Respiratory  o Respiratory Effort  o : breathing comfortably, symmetric chest rise  o Auscultation of Lungs  o : clear to asculatation bilaterally, no wheezes, rales, or rhonchii  · Cardiovascular  o Heart  o :   § Auscultation of Heart  § : regular rate  and rhythm, no murmurs, rubs, or gallops  o Peripheral Vascular System  o :   § Extremities  § : no edema  · Neurologic  o Mental Status Examination  o :   § Orientation  § : grossly oriented to person, place and time  o Gait and Station  o :   § Gait Screening  § : normal gait  · Psychiatric  o General  o : normal mood and affect              Assessment  · Screening for depression     V79.0/Z13.89  PHQ9 score of 2.  · Need for influenza vaccination     V04.81/Z23  Influenza vaccination in clinic today.  · Depression     311/F32.9  Anxiety/depression well controlled, continue escitalopram. Patient to continue to monitor, will seek medical attention immediately if she feels that her mental health is deteriorating. Denies SI/HI. Will continue to monitor.  · Essential hypertension     401.9/I10  Well controlled, continue Coreg and Lasix. Continue to monitor blood pressure at home and call with consistent elevations. Labs in clinic today.   · GERD (gastroesophageal reflux disease)     530.81/K21.9  Well controlled, continue pantoprazole.   · Hyperlipidemia     272.4/E78.5  Well controlled, continue statin. Most recent LDL 63. Lipid panel in clinic today.  · Anxiety     300.00/F41.9  · Hyperthyroidism     242.90/E05.90  Thyroid profile in clinic today. Order repeat thyroid ultrasound at next visit, postponed per patient request.   · CKD (chronic kidney disease) stage 4, GFR 15-29 ml/min     585.4/N18.4  Follow up with nephrology as scheduled. CMP in clinic today.   · Vaginal dryness     625.8/N89.8  Will trial Estrace. Discussed risks associated with hormone replacement, including increased risk of breast cancer, endometrial cancer, PE/DVT, and goal of short term therapy. Patient voices understanding, acknowledges risks and would like to proceed with treatment. She will call or return to clinic with concerns.      Plan  · Orders  o Immunization Admin Fee (Single) ([facility]) (78167) - V04.81/Z23 - 10/05/2020  o Fluzone High  Dose Flu Vaccine (30060) - V04.81/Z23 - 10/05/2020   Vaccine - Influenza; Dose: 0.5; Site: Left Deltoid; Route: Intramuscular; Date: 10/05/2020 15:45:00; Exp: 06/30/2021; Lot: [id number]; Mfg: sanofi pasteur; TradeName: FLUZONE-HIGH DOSE; Administered By: Gayle Fry RN; Comment: Patient tolerated well and left office in stable condition.  o CBC with Auto Diff [facility] (51521) - 401.9/I10, 242.90/E05.90, 585.4/N18.4, 272.4/E78.5 - 10/05/2020  o CMP [facility] (65352) - 401.9/I10, 242.90/E05.90, 585.4/N18.4, 272.4/E78.5 - 10/05/2020  o Vitamin D (25-Hydroxy) Level (86348) - 401.9/I10, 242.90/E05.90, 585.4/N18.4, 272.4/E78.5 - 10/05/2020  o ACO-39: Current medications updated and reviewed (1159F, ) - - 10/05/2020  o ACO-18: Negative screen for clinical depression using a standardized tool () - - 10/05/2020  o ACO-14: Influenza immunization administered or previously received [facility] () - - 10/05/2020  o Vitamin B-1, whole blood (20721) - 242.90/E05.90 - 10/05/2020  o Prealbumin (74313) - 401.9/I10, 242.90/E05.90, 585.4/N18.4, 272.4/E78.5 - 10/05/2020  o Methylmalonic Acid (Serum) (16514) - 401.9/I10, 242.90/E05.90, 585.4/N18.4, 272.4/E78.5 - 10/05/2020  o Iron Profile (Iron 38851 TIBC 05013 and Transferrin 44167) (IRONP) - 401.9/I10, 242.90/E05.90, 585.4/N18.4, 272.4/E78.5 - 10/05/2020  o Folate level (83678) - 401.9/I10, 242.90/E05.90, 585.4/N18.4, 272.4/E78.5 - 10/05/2020  o Ferritin (34743) - 401.9/I10, 242.90/E05.90, 585.4/N18.4, 272.4/E78.5 - 10/05/2020  o Thyroid Profile (TSH, FT4) (50366, THYII, 29120) - 401.9/I10, 242.90/E05.90, 585.4/N18.4, 272.4/E78.5 - 10/05/2020  o Lipid Panel [facility] (55427) - 401.9/I10, 242.90/E05.90, 585.4/N18.4, 272.4/E78.5 - 10/05/2020  · Medications  o Estrace 0.01 % (0.1 mg/gram) vaginal cream   SIG: insert 2 grams daily x 1 week; 1 gram daily x 1 week; followed by 1 gram weekly   DISP: (1) Applicatorful with 1 refills  Prescribed on 10/05/2020     o Medications have been  Reconciled  o Transition of Care or Provider Policy  · Instructions  o Depression Screen completed and scanned into the EMR under the designated folder within the patient's documents.  o Today's PHQ-9 result is 2.  o Patient advised to monitor blood pressure (B/P) at home and journal readings. Patient informed that a B/P reading at home of more than 130/80 is considered hypertension. For readings greater fefu608/90 or higher patient is advised to follow up in the office with readings for management. Patient advised to limit sodium intake.  o Advised that cheeses and other sources of dairy fats, animal fats, fast food, and the extras (candy, pastries, pies, doughnuts and cookies) all contain LDL raising nutrients. Advised to increase fruits, vegetables, whole grains, and to monitor portion sizes.   o Take all medications as prescribed/directed.  o Patient was educated/instructed on their diagnosis, treatment and medications prior to discharge from the clinic today.  o Patient instructed to seek medical attention urgently for new or worsening symptoms.  o Call the office with any concerns or questions.  o Chronic conditions reviewed and taken into consideration for today's treatment plan.  · Disposition  o Call or Return if symptoms worsen or persist.  o Follow up in 3 months  o Prescriptions sent to pharmacy  o Labs drawn in clinic  o Will call patient with results of labs            Electronically Signed by: MAYUR Nelson -Author on October 5, 2020 04:10:50 PM

## 2021-05-13 NOTE — PROGRESS NOTES
Progress Note      Patient Name: Ro Blum   Patient ID: [id number]   Sex: Female   YOB: 1955    Primary Care Provider: Maria Elena MERCHANT   Referring Provider: Maria Elena MERCHNAT    Visit Date: Leyda 15, 2020    Provider: MAYUR Nelson   Location: [facility]   Location Address: [address] [place] [address]  [id number]   Location Phone: [phone]          Chief Complaint  · f/u       History Of Present Illness  TELEHEALTH TELEPHONE VISIT  Chief Complaint: Telehealth; Phone call   Ro Blum is a 64 year old /White female who is presenting for evaluation via telehealth telephone visit. Verbal consent obtained before beginning visit.   Provider spent 13 minutes with the patient during the telehealth visit.   The following staff were present during this visit: Roxana Carl MA; MAYUR Nelson   Past Medical History/ Overview of Patient Symptoms     Informed patient that as visit is being performed as a Telehealth visit there will be no opportunity to obtain vital signs or perform a physical exam. Due to this there is unfortunately a possibility that things may be missed that would typically be noticed during a traditional visit. Patient is aware of this possibility and agrees to proceed with Telehealth visit. Patient states there is no other person present for this phone call.     Phone call started: 1547  Phone call ended: 1600    Depression-  Well controlled with escitalopram. Denies SI/HI.    GERD-  Well controlled with pantoprazole.    HLD-  Managed with statin. Well tolerated, denies leg cramping. Most recent LDL 63.    Hyperthyroid-  Previously managed with methimazole, patient has been off of medication x 2 years. Recent labs WNL.    CKD-  Patient previously pursuing renal transplant. Had bariatric surgery, GFR improved significantly and she was no longer a candidate for transplant. GFR stable at 25. Followed by  nephrology, Dr. Baum. Patient states she has lost 16 more pounds since her last visit in clinic.     HTN-  Managed with Coreg and Lasix. Patient reports home readings 120s/70s. Denies chest pain, blurry vision, headache, leg swelling.     Reviewed recent labs with patient.  Due for mammogram and DEXA scan.       Past Medical History  Disease Name Date Onset Notes   Bariatric surgery status 11/13/2019 --    Gastric reflux --  --    Hiatal hernia with gastroesophageal reflux 10/14/2019 --    High blood pressure --  --    Kidney Disease --  --    Sleep apnea --  --    Stage 4 chronic kidney disease 10/14/2019 --    Status post laparoscopic sleeve gastrectomy 11/13/2019 --    Thyroid Problems --  --          Past Surgical History  Procedure Name Date Notes   Colonoscopy 2013/2019 --    EGD 2013 --    Gastrectomy, laparoscopic --  --    Hernia Repair --  --          Medication List  Name Date Started Instructions   Coreg 3.125 mg oral tablet  take 1 tablet (3.125 mg) by oral route 2 times per day with food   escitalopram oxalate 20 mg oral tablet 04/22/2020 TAKE 1 TABLET EVERY DAY   ferrous sulfate 325 mg (65 mg iron) oral tablet 04/22/2020 TAKE 1 TABLET EVERY DAY   Fiber Gummies 2 gram oral tablet,chewable  --    Folbee 2.5-25-1 mg oral tablet  take 1 tablet by oral route once daily   furosemide 20 mg oral tablet 05/05/2020 take 1 tablet (20 mg) by oral route once daily for 90 days   hydrocodone-acetaminophen 7.5-325 mg/15 mL oral solution  take 15 milliliters by oral route every 4 hours as needed for pain   hydroxyzine HCl 50 mg oral tablet 06/15/2020 take 1 tablet by oral route 2 times a day for 90 days   melatonin 5 mg oral tablet  take 1 tablet by oral route once a day (at bedtime)   simvastatin 20 mg oral tablet 04/22/2020 TAKE 1 TABLET EVERY EVENING   trazodone 100 mg oral tablet 02/14/2020 take 1 tablet by oral route once a day (at bedtime) for 90 days   Ultra Choice Multivit with Min oral  --          Allergy  List  Allergen Name Date Reaction Notes   NO KNOWN DRUG ALLERGIES --  --  --          Family Medical History  Disease Name Relative/Age Notes   Chronic Obstructive Pulmonary Disease  --    Heart Disease  --    - No Family History of Colorectal Cancer  --    Brain tumor  --          Social History  Finding Status Start/Stop Quantity Notes   Alcohol Current some day --/-- seldom --    Tobacco Never --/-- --  --          Immunizations  NameDate Admin Mfg Trade Name Lot Number Route Inj VIS Given VIS Publication   Prevnar 1310/14/2019 [facility] PREVNAR 13 [id number] IM LD 10/14/2019    Comments: Pt tolerated well and left office in stable condition   Prevnar 1310/14/2019 WAL PREVNAR 13 [id number] IM LD 10/14/2019    Comments: Pt tolerated well and left office in stable condition         Physical Examination                Assessment  · CKD (chronic kidney disease) stage 4, GFR 15-29 ml/min     585.4/N18.4  GFR stable. Continue to follow with nephrology as scheduled. Will refill hydroxyzine per patient request.  · Essential hypertension     401.9/I10  Well controlled, continue Coreg and Lasix. Encouraged patient to continue to monitor blood pressure at home and to call or return to clinic with consistent elevations/lows. Recent labs without concern, will repeat in three months.  · Hyperlipidemia     272.4/E78.5  Well controlled, continue statin. Most recent LDL 63.  · Hyperthyroidism     242.90/E05.90  Well controlled without medication management, recent labs WNL. Will repeat in three months, sooner if concerns arise.  · GERD (gastroesophageal reflux disease)     530.81/K21.9  Well controlled, continue pantoprazole.   · Visit for screening mammogram     V76.12/Z12.31  Order placed for mammogram.   · Osteopenia     733.90/M85.80  Order placed for DEXA scan.  · Depression     311/F32.9  Well controlled, continue escitalopram. Encouraged patient to continue to monitor and to seek medical attention immediately if she feels that her  mental health is deteriorating. Denies SI/HI. Will continue to monitor.       Plan  · Orders  o Physician Telephone Evaluation, 11-20 minutes (03007) - - 06/15/2020  o DEXA Bone Density, 1 or more sites, axial skeleton [facility] (84002) - 733.90/M85.80 - 06/15/2020   Please schedule after 8/1/2020  o Mammogram breast screening 3D digital bilateral (12177, 39807, ) - V76.12/Z12.31 - 06/15/2020   Please schedule after 8/1/2020  · Medications  o hydroxyzine HCl 50 mg oral tablet   SIG: take 1 tablet by oral route 2 times a day for 90 days   DISP: (180) tablet with 1 refills  Refilled on 06/15/2020     · Instructions  o Chronic conditions reviewed and taken into consideration for today's treatment plan.  o Take all medications as prescribed/directed.  o Call the office with any concerns or questions.  · Disposition  o Call or Return if symptoms worsen or persist.  o Follow up in 3 months  o Prescriptions sent to pharmacy            Electronically Signed by: MAYUR Nelson -Author on Leyda 15, 2020 05:16:44 PM

## 2021-05-14 VITALS
TEMPERATURE: 97.4 F | SYSTOLIC BLOOD PRESSURE: 120 MMHG | HEIGHT: 60 IN | BODY MASS INDEX: 32.39 KG/M2 | HEART RATE: 63 BPM | DIASTOLIC BLOOD PRESSURE: 70 MMHG | WEIGHT: 165 LBS | OXYGEN SATURATION: 98 %

## 2021-05-14 VITALS
DIASTOLIC BLOOD PRESSURE: 62 MMHG | BODY MASS INDEX: 31.63 KG/M2 | TEMPERATURE: 98 F | OXYGEN SATURATION: 96 % | HEART RATE: 81 BPM | HEIGHT: 60 IN | SYSTOLIC BLOOD PRESSURE: 112 MMHG | WEIGHT: 161.12 LBS

## 2021-05-14 VITALS
SYSTOLIC BLOOD PRESSURE: 118 MMHG | BODY MASS INDEX: 32.98 KG/M2 | HEIGHT: 60 IN | WEIGHT: 168 LBS | DIASTOLIC BLOOD PRESSURE: 76 MMHG | OXYGEN SATURATION: 98 % | HEART RATE: 72 BPM | TEMPERATURE: 97.7 F

## 2021-05-14 NOTE — PROGRESS NOTES
"   Progress Note      Patient Name: Ro Blum   Patient ID: [id number]   Sex: Female   YOB: 1955    Primary Care Provider: Maria Elena MERCHANT   Referring Provider: Maria Elena MERCHANT    Visit Date: April 5, 2021    Provider: MAYUR Nelson   Location: [facility]   Location Address: [address] [place] [address]  [id number]   Location Phone: [phone]          Chief Complaint  · Follow-up  · \"pt states she is having some fatigue\"      History Of Present Illness  Ro Blum is a 65 year old /White female who presents for evaluation and treatment of:      CKD-  Most recent GFR. Managed by Dr. Baum, nephrology with Lasix. Hydroxyzine for itching. Has not followed up in over one year. Most recent GFR 21. Stable after steady increase, post bariatric surgery and weight loss.    Insomnia-  Patient reports intermittent nightmares with trazodone, takes PRN and alternates with Tylenol PM.    Hyperthyroid-  Previously managed with methimazole. Off of medication x 2 years. Stable goiter on thyroid ultrasound. Patient reports a recent increase in fatigue.     SEVEN-  Managed with CPAP, recently had adjustments with new supplies.    Anxiety/depression-  Well controlled with escitalopram. Denies SI/HI.    HTN-  Well controlled with Coreg and Lasix. Reports home readings 120s/80s. Denies chest pain, blurry vision, headache, leg swelling.    Iron deficiency-  Managed with oral supplement.    HLD-  Managed with statin. Well tolerated, denies leg cramping. Most recent LDL 82.    GERD-  Well controlled with pantoprazole.    Up to date on mammogram, colonoscopy, DEXA and PAP smear.       Past Medical History  Disease Name Date Onset Notes   Bariatric surgery status 11/13/2019 --    Gastric reflux --  --    Hiatal hernia with gastroesophageal reflux 10/14/2019 --    High blood pressure --  --    Kidney Disease --  --    Sleep apnea --  --    Stage 4 " chronic kidney disease 10/14/2019 --    Status post laparoscopic sleeve gastrectomy 11/13/2019 --    Thyroid Problems --  --          Past Surgical History  Procedure Name Date Notes   Colonoscopy 2013/2019 --    EGD 2013 --    Gastrectomy, laparoscopic --  --    Hernia Repair --  --          Medication List  Name Date Started Instructions   Calcium 600 + D(3) 600 mg(1,500mg) -400 unit oral tablet 12/04/2020 take 2 tablets by oral route daily for 90 days   Coreg 3.125 mg oral tablet 02/25/2021 take 1 tablet (3.125 mg) by oral route 2 times per day with food   escitalopram oxalate 20 mg oral tablet 02/25/2021 TAKE 1 TABLET EVERY DAY   ferrous sulfate 325 mg (65 mg iron) oral tablet 02/25/2021 TAKE 1 TABLET EVERY DAY   Fiber Gummies 2 gram oral tablet,chewable  --    [place] multi vitamin one oral  one daily.   Folbee 2.5-25-1 mg oral tablet  take 1 tablet by oral route once daily   furosemide 20 mg oral tablet 02/25/2021 take 1 tablet (20 mg) by oral route once daily for 90 days   hydrocodone-acetaminophen 7.5-325 mg oral tablet  3 times per day as needed   hydroxyzine HCl 50 mg oral tablet 03/29/2021 take 1 tablet by oral route 2 times a day for 90 days   melatonin 5 mg oral tablet  take 1 tablet by oral route once a day (at bedtime)   simvastatin 20 mg oral tablet 04/05/2021 TAKE 1 TABLET EVERY EVENING   trazodone 100 mg oral tablet 10/23/2020 TAKE 1 TABLET AT BEDTIME         Allergy List  Allergen Name Date Reaction Notes   NO KNOWN DRUG ALLERGIES --  --  --          Family Medical History  Disease Name Relative/Age Notes   Chronic Obstructive Pulmonary Disease  --    Heart Disease  --    - No Family History of Colorectal Cancer  --    Brain tumor  --          Social History  Finding Status Start/Stop Quantity Notes   Alcohol Current some day --/-- seldom --    Tobacco Never --/-- --  --          Immunizations  NameDate Admin Mfg Trade Name Lot Number Route Inj VIS Given VIS Publication   [id number]/05/2020  PMC FLUZONE-HIGH DOSE [id number] IM LD 10/05/2020    Comments: Patient tolerated well and left office in stable condition.   Qquxlmzvt1654/05/2021 MSD PNEUMOVAX 23 [id number] IM LD 01/05/2021    Comments: Pt tolerated well. Left the office in stable condition. TITO PERRY.   Prevnar 1310/14/2019 WAL PREVNAR 13 [id number] IM LD 10/14/2019    Comments: Pt tolerated well and left office in stable condition         Review of Systems  · Constitutional  o Admits  o : fatigue  o Denies  o : fever, weight loss, weight gain  · Eyes  o Denies  o : discharge from eye, impaired vision, blurred vision  · HENT  o Denies  o : headaches, vertigo, lightheadedness  · Cardiovascular  o Denies  o : lower extremity edema, chest pressure, palpitations  · Respiratory  o Denies  o : shortness of breath, wheezing, cough, dyspnea on exertion  · Gastrointestinal  o Denies  o : nausea, vomiting, diarrhea, constipation, abdominal pain  · Integument  o Admits  o : itching  o Denies  o : rash, new skin lesions  · Psychiatric  o Admits  o : anxiety, depression, difficulty sleeping  o Denies  o : suicidal ideation, homicidal ideation      Vitals  Date Time BP Position Site L\R Cuff Size HR RR TEMP (F) WT  HT  BMI kg/m2 BSA m2 O2 Sat FR L/min FiO2 HC       01/05/2021 02:29 /70 Sitting    63 - R  97.4 165lbs 0oz 5'   32.22 1.78 98 %  21%    04/05/2021 02:27 /76 Sitting    72 - R  97.7 168lbs 0oz 5'   32.81 1.8 98 %  21%          Physical Examination  · Constitutional  o Appearance  o : no acute distress, well-nourished  · Head and Face  o Head  o :   § Inspection  § : atraumatic, normocephalic  · Eyes  o Eyes  o : extraocular movements intact, no scleral icterus, no conjunctival injection  · Ears, Nose, Mouth and Throat  o Ears  o :   § External Ears  § : normal  o Nose  o :   § Intranasal Exam  § : nares patent  o Oral Cavity  o :   § Oral Mucosa  § : moist mucous membranes  · Neck  o Thyroid  o : gland size normal, nontender, no nodules or masses  present on palpation, symmetric  · Respiratory  o Respiratory Effort  o : breathing comfortably, symmetric chest rise  o Auscultation of Lungs  o : clear to asculatation bilaterally, no wheezes, rales, or rhonchii  · Cardiovascular  o Heart  o :   § Auscultation of Heart  § : regular rate and rhythm, no murmurs, rubs, or gallops  o Peripheral Vascular System  o :   § Extremities  § : no edema  · Lymphatic  o Neck  o : no lymphadenopathy present  o Supraclavicular Nodes  o : no supraclavicular nodes  · Skin and Subcutaneous Tissue  o General Inspection  o : no lesions present, no areas of discoloration, skin turgor normal  · Neurologic  o Mental Status Examination  o :   § Orientation  § : grossly oriented to person, place and time  o Gait and Station  o :   § Gait Screening  § : normal gait  · Psychiatric  o General  o : normal mood and affect              Assessment  · Depression     311/F32.9  Anxiety/depression well controlled, continue escitalopram. Encouraged patient to continue to monitor, will seek medical attention immediately if she feels that her mental health is deteriorating. Denies SI/HI. Will continue to monitor.   · Essential hypertension     401.9/I10  Well controlled, continue Coreg and Lasix. Encouraged patient to continue to monitor blood pressure at home and to call or return to clinic with consistent elevations greater than 130s/80s. Labs in clinic today.  · Fatigue     780.79/R53.83  Will check routine labs as patient has many underlying conditions that may impact fatigue, including anemia, CKD, thyroid dysfunction, vitamin D deficiency. Continue CPAP. Will determine if further intervention is warranted based on results of labs.   · GERD (gastroesophageal reflux disease)     530.81/K21.9  Well controlled, continue pantoprazole.   · Hyperlipidemia     272.4/E78.5  Well controlled, continue statin. Most recent LDL 82. Lipid panel in clinic today.   · Vitamin D deficiency     268.9/E55.9  History  of supplementation. Vitamin D level in clinic today.   · CKD (chronic kidney disease) stage 4, GFR 15-29 ml/min     585.4/N18.4  Continue Lasix and hydroxyzine. Most recent GFR 21. CMP in clinic today. Encouraged patient to schedule routine visit with nephrology. Will continue to monitor.  · Hyperthyroidism     242.90/E05.90  History of medication management. Most recent thyroid ultrasound stable. Thyroid profile in clinic today.   · Insomnia     780.52/G47.00  Continue PRN trazodone. Will monitor liver enzymes due to Tylenol use. Patient to call or return to clinic with concerns.  · Anxiety     300.00/F41.9  · Iron deficiency anemia     280.9/D50.9  Continue iron supplement. CBC, iron profile and ferritin in clinic today.   · SEVEN (obstructive sleep apnea)     327.23/G47.33  Continue CPAP.      Plan  · Orders  o CMP H (89334) - 272.4/E78.5 - 04/05/2021  o Lipid Panel [facility] (74017) - 272.4/E78.5 - 04/05/2021  o Vitamin D Level (71848) - 268.9/E55.9 - 04/05/2021  o CBC with Auto Diff [facility] (05679) - 280.9/D50.9, 780.79/R53.83 - 04/05/2021  o Thyroid Profile (20916, 56390, THYII) - 242.90/E05.90 - 04/05/2021  o ACO-20: Screening Mammography documented and reviewed [facility] () - - 04/05/2021  o ACO-19: Colorectal cancer screening results documented and reviewed (3017F) - - 04/05/2021  o ACO-39: Current medications updated and reviewed (, 1159F) - - 04/05/2021  o Ferritin ser/plas (65363) - 280.9/D50.9 - 04/05/2021  o Iron Profile (Iron 79303 TIBC 09078 and Transferrin 43070) (IRONP) - 280.9/D50.9 - 04/05/2021  o B12 Folate levels (B12FO) - 780.79/R53.83 - 04/05/2021  · Instructions  o Patient advised to monitor blood pressure (B/P) at home and journal readings. Patient informed that a B/P reading at home of more than 130/80 is considered hypertension. For readings greater ycjw479/90 or higher patient is advised to follow up in the office with readings for management. Patient advised to limit sodium intake.  o Advised  that cheeses and other sources of dairy fats, animal fats, fast food, and the extras (candy, pastries, pies, doughnuts and cookies) all contain LDL raising nutrients. Advised to increase fruits, vegetables, whole grains, and to monitor portion sizes.   o Take all medications as prescribed/directed.  o Patient was educated/instructed on their diagnosis, treatment and medications prior to discharge from the clinic today.  o Patient instructed to seek medical attention urgently for new or worsening symptoms.  o Call the office with any concerns or questions.  o Chronic conditions reviewed and taken into consideration for today's treatment plan.  · Disposition  o Call or Return if symptoms worsen or persist.  o Prescriptions sent to pharmacy  o Labs drawn in clinic  o Will call patient with results of labs  o Return Visit Request in/on 4 months +/- 2 days (52620).            Electronically Signed by: MAYUR Nelson -Author on April 5, 2021 04:45:19 PM

## 2021-05-14 NOTE — PROGRESS NOTES
"   Progress Note      Patient Name: oR Blum   Patient ID: [id number]   Sex: Female   YOB: 1955    Primary Care Provider: Maria Elena MERCHANT   Referring Provider: Maria Elena MERCHANT    Visit Date: January 5, 2021    Provider: MAYUR Nelson   Location: [facility]   Location Address: [address] [place] [address]  [id number]   Location Phone: [phone]          Chief Complaint  · Follow-up   · \"I need my thyroid checked\"  · \"I need my second pneumonia shot\"   · \"I could not afford the Estrace\"       History Of Present Illness  Ro Blum is a 65 year old /White female who presents for evaluation and treatment of:      CKD-  Most recent GFR 24, patient has not followed up with nephrology, Dr. Baum, since 3/2020. Managed with Lasix and hydroxyzine for itching. Patient with close to 100 pound weight loss since bariatric surgery, GFR has continued to improve since operation.     Insomnia-  Well controlled with trazodone.    Hyperthyroid-  Previously managed with methimazole, has been off of medication x 2 years. Recent labs slightly abnormal, due for repeat today. Patient has never been evaluated by endocrinology. Thyroid ultrasound 2018 with evidence of stable goiter, due for repeat imaging.     Anxiety/depression-  Well controlled with escitalopram. Denies SI/HI.    HTN-  Well controlled with Coreg and Lasix. Reports home blood pressure readings 120s/80s. Denies chest pain, blurry vision, headache and leg swelling.    Iron deficiency-  Managed with oral iron supplement.    HLD-  Managed with statin, well tolerated. Denies leg cramping. Most recent LDL 69.    GERD-  Well controlled with pantoprazole.     Up to date on mammogram, DEXA and PAP.   Due for pneumonia vaccination.       Past Medical History  Disease Name Date Onset Notes   Bariatric surgery status 11/13/2019 --    Gastric reflux --  --    Hiatal hernia with " gastroesophageal reflux 10/14/2019 --    High blood pressure --  --    Kidney Disease --  --    Sleep apnea --  --    Stage 4 chronic kidney disease 10/14/2019 --    Status post laparoscopic sleeve gastrectomy 11/13/2019 --    Thyroid Problems --  --          Past Surgical History  Procedure Name Date Notes   Colonoscopy 2013/2019 --    EGD 2013 --    Gastrectomy, laparoscopic --  --    Hernia Repair --  --          Medication List  Name Date Started Instructions   Calcium 600 + D(3) 600 mg(1,500mg) -400 unit oral tablet 12/04/2020 take 2 tablets by oral route daily for 90 days   Coreg 3.125 mg oral tablet 10/23/2020 take 1 tablet (3.125 mg) by oral route 2 times per day with food   escitalopram oxalate 20 mg oral tablet 10/23/2020 TAKE 1 TABLET EVERY DAY   ferrous sulfate 325 mg (65 mg iron) oral tablet 10/23/2020 TAKE 1 TABLET EVERY DAY   Fiber Gummies 2 gram oral tablet,chewable  --    [place] multi vitamin one oral  one daily.   Folbee 2.5-25-1 mg oral tablet  take 1 tablet by oral route once daily   furosemide 20 mg oral tablet 10/23/2020 take 1 tablet (20 mg) by oral route once daily for 90 days   hydrocodone-acetaminophen 7.5-325 mg oral tablet  3 times per day as needed   hydroxyzine HCl 50 mg oral tablet 10/23/2020 take 1 tablet by oral route 2 times a day for 90 days   melatonin 5 mg oral tablet  take 1 tablet by oral route once a day (at bedtime)   simvastatin 20 mg oral tablet 10/23/2020 TAKE 1 TABLET EVERY EVENING   trazodone 100 mg oral tablet 10/23/2020 TAKE 1 TABLET AT BEDTIME         Allergy List  Allergen Name Date Reaction Notes   NO KNOWN DRUG ALLERGIES --  --  --        Allergies Reconciled  Family Medical History  Disease Name Relative/Age Notes   Chronic Obstructive Pulmonary Disease  --    Heart Disease  --    - No Family History of Colorectal Cancer  --    Brain tumor  --          Social History  Finding Status Start/Stop Quantity Notes   Alcohol Current some day --/-- seldom --     Tobacco Never --/-- --  --          Immunizations  NameDate Admin Mfg Trade Name Lot Number Route Inj VIS Given VIS Publication   [id number]/05/2020 PMC FLUZONE-HIGH DOSE [id number] [facility] 10/05/2020    Comments: Patient tolerated well and left office in stable condition.   Qkxtksyek2931/05/2021 MSD PNEUMOVAX 23 [id number]  LD 01/05/2021    Comments: Pt tolerated well. Left the office in stable condition. TITO PERRY.   Prevnar 1310/14/2019 WAL PREVNAR 13 [id number] [facility] 10/14/2019    Comments: Pt tolerated well and left office in stable condition         Review of Systems  · Constitutional  o Denies  o : fever, fatigue, weight loss, weight gain  · Eyes  o Denies  o : discharge from eye, impaired vision, blurred vision  · HENT  o Denies  o : headaches, vertigo, lightheadedness  · Cardiovascular  o Denies  o : lower extremity edema, chest pressure, palpitations  · Respiratory  o Denies  o : shortness of breath, wheezing, cough, dyspnea on exertion  · Gastrointestinal  o Denies  o : nausea, vomiting, diarrhea, constipation, abdominal pain  · Genitourinary  o Denies  o : urgency, frequency, dysuria  · Integument  o Admits  o : itching  o Denies  o : rash, new skin lesions  · Psychiatric  o Admits  o : anxiety, depression  o Denies  o : suicidal ideation, homicidal ideation      Vitals  Date Time BP Position Site L\R Cuff Size HR RR TEMP (F) WT  HT  BMI kg/m2 BSA m2 O2 Sat FR L/min FiO2        02/14/2020 02:51 /78 Sitting    65 - R 16 97.6 185lbs 6oz 5'   36.2 1.89 100 %  21%    10/05/2020 02:44 /62 Sitting    81 - R  98 161lbs 2oz 5'   31.47 1.76 96 %  21%    01/05/2021 02:29 /70 Sitting    63 - R  97.4 165lbs 0oz 5'   32.22 1.78 98 %  21%          Physical Examination  · Constitutional  o Appearance  o : no acute distress, well-nourished  · Head and Face  o Head  o :   § Inspection  § : atraumatic, normocephalic  · Eyes  o Eyes  o : extraocular movements intact, no scleral icterus, no conjunctival  injection  · Ears, Nose, Mouth and Throat  o Ears  o :   § External Ears  § : normal  o Nose  o :   § Intranasal Exam  § : nares patent  o Oral Cavity  o :   § Oral Mucosa  § : moist mucous membranes  · Neck  o Thyroid  o : gland size normal, nontender, no nodules or masses present on palpation, symmetric  · Respiratory  o Respiratory Effort  o : breathing comfortably, symmetric chest rise  o Auscultation of Lungs  o : clear to asculatation bilaterally, no wheezes, rales, or rhonchii  · Cardiovascular  o Heart  o :   § Auscultation of Heart  § : regular rate and rhythm, no murmurs, rubs, or gallops  o Peripheral Vascular System  o :   § Extremities  § : no edema  · Lymphatic  o Neck  o : no lymphadenopathy present  o Supraclavicular Nodes  o : no supraclavicular nodes  · Skin and Subcutaneous Tissue  o General Inspection  o : no lesions present, no areas of discoloration, skin turgor normal  · Neurologic  o Mental Status Examination  o :   § Orientation  § : grossly oriented to person, place and time  o Gait and Station  o :   § Gait Screening  § : normal gait  · Psychiatric  o General  o : normal mood and affect              Assessment  · Depression     311/F32.9  Anxiety/depression well controlled, continue escitalopram. Patient to continue to monitor, will seek medical attention immediately if she feels that her mental health is deteriorating. Denies SI/HI.  · Essential hypertension     401.9/I10  Well controlled, continue Coreg and Lasix. Encouraged patient to continue to monitor blood pressure at home and to call with consistent elevations. Labs in clinic today.  · GERD (gastroesophageal reflux disease)     530.81/K21.9  Well controlled, continue pantoprazole.   · Hyperlipidemia     272.4/E78.5  Well controlled, continue statin. Most recent LDL 69. Repeat lipid panel in clinic today.  · Need for pneumococcal vaccination     V03.82/Z23  Pneumonia vaccination in clinic  today.  · Hyperthyroidism     242.90/E05.90  Repeat thyroid profile and thyroid ultrasound. Will determine if further intervention, including medication management, is warranted based on results.  · Goiter     240.9/E04.9  · CKD (chronic kidney disease) stage 4, GFR 15-29 ml/min     585.4/N18.4  CMP in clinic today. Patient to follow up with nephrology as scheduled. Will continue to monitor. Continue hydroxyzine for itching.  · Insomnia     780.52/G47.00  Well controlled, continue trazodone.   · Anxiety     300.00/F41.9  · Iron deficiency     280.9/E61.1  Continue iron supplement, CBC, iron profile and ferritin in clinic today.     Problems Reconciled  Plan  · Orders  o CBC with Auto Diff [facility] (54134) - 401.9/I10 - 01/05/2021  o CMP [facility] (08819) - 401.9/I10 - 01/05/2021  o Lipid Panel [facility] (85347) - 272.4/E78.5 - 01/05/2021  o Immunization Admin Fee (Single) ([facility]) (15897) - V03.82/Z23 - 01/05/2021  o Pneumococcal Vaccine, 23 valent, adult (27012) - V03.82/Z23 - 01/05/2021   Vaccine - [id number]; Dose: 0.5; Site: Left Deltoid; Route: Intramuscular; Date: 01/05/2021 15:58:00; Exp: 05/08/2022; Lot: [id number]; Mfg: [employer] TradeName: PNEUMOVAX 23; Administered By: Yocasta Talley MA; Comment: Pt tolerated well. Left the office in stable condition. TITO PERRY.  o Administration of Pneumococcal Vaccine - Medicare () - V03.82/Z23 - 01/05/2021  o ACO-15: Pneumococcal Vaccine Administered or Previously Received (4040F) - V03.82/Z23 - 01/05/2021  o Thyroid Profile (THYII, 36816, 17253) - 242.90/E05.90 - 01/05/2021  o ACO-15: Pneumococcal Vaccine Administered or Previously Received [facility] (4040F) - - 01/05/2021  o ACO-39: Current medications updated and reviewed (1159F, ) - - 01/05/2021  o Thyroid Ultrasound. (18045) - 242.90/E05.90, 240.9/E04.9 - 01/05/2021  o Iron Profile (Iron, TIBC, and Transferrin) (IRONP) - 280.9/E61.1 - 01/05/2021  o Ferritin (15987) - 280.9/E61.1 - 01/05/2021  · Medications  o Medications  have been Reconciled  o Transition of Care or Provider Policy  · Instructions  o Patient advised to monitor blood pressure (B/P) at home and journal readings. Patient informed that a B/P reading at home of more than 130/80 is considered hypertension. For readings greater bnds341/90 or higher patient is advised to follow up in the office with readings for management. Patient advised to limit sodium intake.  o Advised that cheeses and other sources of dairy fats, animal fats, fast food, and the extras (candy, pastries, pies, doughnuts and cookies) all contain LDL raising nutrients. Advised to increase fruits, vegetables, whole grains, and to monitor portion sizes.   o Take all medications as prescribed/directed.  o Patient was educated/instructed on their diagnosis, treatment and medications prior to discharge from the clinic today.  o Patient instructed to seek medical attention urgently for new or worsening symptoms.  o Call the office with any concerns or questions.  o Chronic conditions reviewed and taken into consideration for today's treatment plan.  · Disposition  o Call or Return if symptoms worsen or persist.  o Follow up in 3 months  o Labs drawn in clinic  o Will call patient with results of labs  o Will call patient with results of imaging  o Imaging to be performed outside of clinic            Electronically Signed by: MAYUR Nelson -Author on January 5, 2021 06:23:47 PM

## 2021-05-15 VITALS
SYSTOLIC BLOOD PRESSURE: 128 MMHG | WEIGHT: 219.12 LBS | TEMPERATURE: 96.4 F | HEART RATE: 77 BPM | DIASTOLIC BLOOD PRESSURE: 74 MMHG | BODY MASS INDEX: 43.02 KG/M2 | OXYGEN SATURATION: 95 % | HEIGHT: 60 IN

## 2021-05-15 VITALS
BODY MASS INDEX: 43.81 KG/M2 | DIASTOLIC BLOOD PRESSURE: 72 MMHG | SYSTOLIC BLOOD PRESSURE: 132 MMHG | HEIGHT: 60 IN | TEMPERATURE: 97.3 F | WEIGHT: 223.12 LBS | OXYGEN SATURATION: 96 % | HEART RATE: 74 BPM

## 2021-05-15 VITALS
RESPIRATION RATE: 16 BRPM | WEIGHT: 185.37 LBS | OXYGEN SATURATION: 100 % | BODY MASS INDEX: 36.39 KG/M2 | SYSTOLIC BLOOD PRESSURE: 110 MMHG | TEMPERATURE: 97.6 F | HEIGHT: 60 IN | HEART RATE: 65 BPM | DIASTOLIC BLOOD PRESSURE: 78 MMHG

## 2021-05-15 VITALS
TEMPERATURE: 96.8 F | DIASTOLIC BLOOD PRESSURE: 70 MMHG | HEIGHT: 60 IN | OXYGEN SATURATION: 97 % | HEART RATE: 88 BPM | WEIGHT: 207.25 LBS | SYSTOLIC BLOOD PRESSURE: 114 MMHG | BODY MASS INDEX: 40.69 KG/M2

## 2021-05-15 VITALS
HEART RATE: 87 BPM | OXYGEN SATURATION: 97 % | DIASTOLIC BLOOD PRESSURE: 84 MMHG | WEIGHT: 228.5 LBS | SYSTOLIC BLOOD PRESSURE: 110 MMHG | HEIGHT: 60 IN | RESPIRATION RATE: 16 BRPM | BODY MASS INDEX: 44.86 KG/M2 | TEMPERATURE: 97.5 F

## 2021-05-15 VITALS
TEMPERATURE: 97 F | BODY MASS INDEX: 46.13 KG/M2 | RESPIRATION RATE: 16 BRPM | SYSTOLIC BLOOD PRESSURE: 126 MMHG | HEART RATE: 72 BPM | WEIGHT: 235 LBS | HEIGHT: 60 IN | DIASTOLIC BLOOD PRESSURE: 82 MMHG | OXYGEN SATURATION: 98 %

## 2021-05-15 VITALS
WEIGHT: 220.12 LBS | BODY MASS INDEX: 43.21 KG/M2 | HEIGHT: 60 IN | SYSTOLIC BLOOD PRESSURE: 120 MMHG | HEART RATE: 77 BPM | OXYGEN SATURATION: 97 % | TEMPERATURE: 97.7 F | DIASTOLIC BLOOD PRESSURE: 74 MMHG

## 2021-05-15 VITALS
SYSTOLIC BLOOD PRESSURE: 124 MMHG | WEIGHT: 242 LBS | RESPIRATION RATE: 12 BRPM | OXYGEN SATURATION: 99 % | BODY MASS INDEX: 47.51 KG/M2 | HEART RATE: 73 BPM | DIASTOLIC BLOOD PRESSURE: 65 MMHG | HEIGHT: 60 IN

## 2021-05-15 VITALS
BODY MASS INDEX: 45.06 KG/M2 | TEMPERATURE: 97.6 F | HEIGHT: 60 IN | HEART RATE: 86 BPM | DIASTOLIC BLOOD PRESSURE: 86 MMHG | WEIGHT: 229.5 LBS | OXYGEN SATURATION: 97 % | SYSTOLIC BLOOD PRESSURE: 118 MMHG

## 2021-05-15 VITALS
HEIGHT: 60 IN | SYSTOLIC BLOOD PRESSURE: 120 MMHG | OXYGEN SATURATION: 98 % | BODY MASS INDEX: 37.15 KG/M2 | HEART RATE: 71 BPM | RESPIRATION RATE: 16 BRPM | DIASTOLIC BLOOD PRESSURE: 80 MMHG | TEMPERATURE: 98.4 F | WEIGHT: 189.25 LBS

## 2021-05-15 VITALS
HEIGHT: 60 IN | WEIGHT: 215.12 LBS | TEMPERATURE: 98.5 F | DIASTOLIC BLOOD PRESSURE: 78 MMHG | HEART RATE: 80 BPM | OXYGEN SATURATION: 94 % | BODY MASS INDEX: 42.24 KG/M2 | SYSTOLIC BLOOD PRESSURE: 124 MMHG

## 2021-05-16 VITALS
SYSTOLIC BLOOD PRESSURE: 116 MMHG | HEART RATE: 80 BPM | TEMPERATURE: 97.5 F | BODY MASS INDEX: 49.08 KG/M2 | RESPIRATION RATE: 18 BRPM | OXYGEN SATURATION: 99 % | HEIGHT: 60 IN | DIASTOLIC BLOOD PRESSURE: 76 MMHG | WEIGHT: 250 LBS

## 2021-08-23 RX ORDER — ESCITALOPRAM OXALATE 20 MG/1
TABLET ORAL
Qty: 90 TABLET | Refills: 0 | Status: SHIPPED | OUTPATIENT
Start: 2021-08-23 | End: 2021-10-19 | Stop reason: SDUPTHER

## 2021-08-23 RX ORDER — FERROUS SULFATE 325(65) MG
TABLET ORAL
Qty: 90 TABLET | Refills: 0 | Status: SHIPPED | OUTPATIENT
Start: 2021-08-23 | End: 2021-10-19 | Stop reason: SDUPTHER

## 2021-08-23 RX ORDER — FUROSEMIDE 20 MG/1
TABLET ORAL
Qty: 90 TABLET | Refills: 0 | Status: SHIPPED | OUTPATIENT
Start: 2021-08-23 | End: 2021-10-19 | Stop reason: SDUPTHER

## 2021-09-24 ENCOUNTER — HOSPITAL ENCOUNTER (OUTPATIENT)
Dept: GENERAL RADIOLOGY | Facility: HOSPITAL | Age: 66
Discharge: HOME | End: 2021-09-24
Admitting: NURSE PRACTITIONER

## 2021-09-24 ENCOUNTER — TRANSCRIBE ORDERS (OUTPATIENT)
Dept: ADMINISTRATIVE | Facility: HOSPITAL | Age: 66
End: 2021-09-24

## 2021-09-24 DIAGNOSIS — M25.552 PAIN IN LEFT HIP: Primary | ICD-10-CM

## 2021-09-24 DIAGNOSIS — M25.552 PAIN IN LEFT HIP: ICD-10-CM

## 2021-09-24 PROCEDURE — 73502 X-RAY EXAM HIP UNI 2-3 VIEWS: CPT

## 2021-10-04 ENCOUNTER — TELEPHONE (OUTPATIENT)
Dept: INTERNAL MEDICINE | Facility: CLINIC | Age: 66
End: 2021-10-04

## 2021-10-04 RX ORDER — SIMVASTATIN 20 MG
TABLET ORAL
Qty: 90 TABLET | Refills: 1 | OUTPATIENT
Start: 2021-10-04

## 2021-10-04 RX ORDER — SIMVASTATIN 20 MG
20 TABLET ORAL NIGHTLY
Qty: 90 TABLET | Refills: 1 | Status: SHIPPED | OUTPATIENT
Start: 2021-10-04 | End: 2022-01-13 | Stop reason: SDUPTHER

## 2021-10-04 NOTE — TELEPHONE ENCOUNTER
Caller: Ro Adorno    Relationship: Self    Best call back number: [phone]     What medication are you requesting:   SIMVASTATIN 20 MG TABLET       If a prescription is needed, what is your preferred pharmacy and phone number: [id number] - [place], [place] - [address]  [phone] [facility] [phone] FX     Additional notes: PLEASE CALL AND ADVISE

## 2021-10-19 ENCOUNTER — OFFICE VISIT (OUTPATIENT)
Dept: INTERNAL MEDICINE | Facility: CLINIC | Age: 66
End: 2021-10-19

## 2021-10-19 VITALS
WEIGHT: 175 LBS | OXYGEN SATURATION: 99 % | BODY MASS INDEX: 34.36 KG/M2 | TEMPERATURE: 97 F | DIASTOLIC BLOOD PRESSURE: 72 MMHG | SYSTOLIC BLOOD PRESSURE: 108 MMHG | HEIGHT: 60 IN | HEART RATE: 72 BPM

## 2021-10-19 DIAGNOSIS — F33.0 MAJOR DEPRESSIVE DISORDER, RECURRENT, MILD (HCC): ICD-10-CM

## 2021-10-19 DIAGNOSIS — G47.33 OSA (OBSTRUCTIVE SLEEP APNEA): ICD-10-CM

## 2021-10-19 DIAGNOSIS — E05.90 HYPERTHYROIDISM: Primary | ICD-10-CM

## 2021-10-19 DIAGNOSIS — I10 ESSENTIAL HYPERTENSION: ICD-10-CM

## 2021-10-19 DIAGNOSIS — F41.9 ANXIETY: ICD-10-CM

## 2021-10-19 DIAGNOSIS — Z12.31 VISIT FOR SCREENING MAMMOGRAM: ICD-10-CM

## 2021-10-19 DIAGNOSIS — Z23 FLU VACCINE NEED: ICD-10-CM

## 2021-10-19 DIAGNOSIS — E55.9 VITAMIN D DEFICIENCY: ICD-10-CM

## 2021-10-19 DIAGNOSIS — E78.5 HYPERLIPIDEMIA, UNSPECIFIED HYPERLIPIDEMIA TYPE: ICD-10-CM

## 2021-10-19 DIAGNOSIS — E61.1 IRON DEFICIENCY: ICD-10-CM

## 2021-10-19 DIAGNOSIS — K21.9 GASTROESOPHAGEAL REFLUX DISEASE WITHOUT ESOPHAGITIS: ICD-10-CM

## 2021-10-19 DIAGNOSIS — N18.4 STAGE 4 CHRONIC KIDNEY DISEASE (HCC): ICD-10-CM

## 2021-10-19 LAB
25(OH)D3 SERPL-MCNC: 72.8 NG/ML (ref 30–100)
ALBUMIN SERPL-MCNC: 4.5 G/DL (ref 3.5–5.2)
ALBUMIN/GLOB SERPL: 1.5 G/DL
ALP SERPL-CCNC: 90 U/L (ref 39–117)
ALT SERPL W P-5'-P-CCNC: 117 U/L (ref 1–33)
ANION GAP SERPL CALCULATED.3IONS-SCNC: 11.1 MMOL/L (ref 5–15)
AST SERPL-CCNC: 83 U/L (ref 1–32)
BASOPHILS # BLD AUTO: 0.08 10*3/MM3 (ref 0–0.2)
BASOPHILS NFR BLD AUTO: 0.9 % (ref 0–1.5)
BILIRUB SERPL-MCNC: 0.3 MG/DL (ref 0–1.2)
BILIRUB UR QL STRIP: NEGATIVE
BUN SERPL-MCNC: 48 MG/DL (ref 8–23)
BUN/CREAT SERPL: 25.7 (ref 7–25)
CALCIUM SPEC-SCNC: 9.4 MG/DL (ref 8.6–10.5)
CHLORIDE SERPL-SCNC: 104 MMOL/L (ref 98–107)
CHOLEST SERPL-MCNC: 159 MG/DL (ref 0–200)
CLARITY UR: CLEAR
CO2 SERPL-SCNC: 24.9 MMOL/L (ref 22–29)
COLOR UR: YELLOW
CREAT SERPL-MCNC: 1.87 MG/DL (ref 0.57–1)
DEPRECATED RDW RBC AUTO: 43.9 FL (ref 37–54)
EOSINOPHIL # BLD AUTO: 0.43 10*3/MM3 (ref 0–0.4)
EOSINOPHIL NFR BLD AUTO: 5 % (ref 0.3–6.2)
ERYTHROCYTE [DISTWIDTH] IN BLOOD BY AUTOMATED COUNT: 12.7 % (ref 12.3–15.4)
FERRITIN SERPL-MCNC: 583 NG/ML (ref 13–150)
GFR SERPL CREATININE-BSD FRML MDRD: 27 ML/MIN/1.73
GLOBULIN UR ELPH-MCNC: 3 GM/DL
GLUCOSE SERPL-MCNC: 93 MG/DL (ref 65–99)
GLUCOSE UR STRIP-MCNC: NEGATIVE MG/DL
HCT VFR BLD AUTO: 37.2 % (ref 34–46.6)
HDLC SERPL-MCNC: 71 MG/DL (ref 40–60)
HGB BLD-MCNC: 12.1 G/DL (ref 12–15.9)
HGB UR QL STRIP.AUTO: NEGATIVE
IMM GRANULOCYTES # BLD AUTO: 0.03 10*3/MM3 (ref 0–0.05)
IMM GRANULOCYTES NFR BLD AUTO: 0.3 % (ref 0–0.5)
IRON 24H UR-MRATE: 92 MCG/DL (ref 37–145)
IRON SATN MFR SERPL: 28 % (ref 20–50)
KETONES UR QL STRIP: NEGATIVE
LDLC SERPL CALC-MCNC: 70 MG/DL (ref 0–100)
LDLC/HDLC SERPL: 0.95 {RATIO}
LEUKOCYTE ESTERASE UR QL STRIP.AUTO: NEGATIVE
LYMPHOCYTES # BLD AUTO: 2.63 10*3/MM3 (ref 0.7–3.1)
LYMPHOCYTES NFR BLD AUTO: 30.5 % (ref 19.6–45.3)
MCH RBC QN AUTO: 30.5 PG (ref 26.6–33)
MCHC RBC AUTO-ENTMCNC: 32.5 G/DL (ref 31.5–35.7)
MCV RBC AUTO: 93.7 FL (ref 79–97)
MONOCYTES # BLD AUTO: 0.7 10*3/MM3 (ref 0.1–0.9)
MONOCYTES NFR BLD AUTO: 8.1 % (ref 5–12)
NEUTROPHILS NFR BLD AUTO: 4.76 10*3/MM3 (ref 1.7–7)
NEUTROPHILS NFR BLD AUTO: 55.2 % (ref 42.7–76)
NITRITE UR QL STRIP: NEGATIVE
NRBC BLD AUTO-RTO: 0 /100 WBC (ref 0–0.2)
PH UR STRIP.AUTO: <=5 [PH] (ref 5–8)
PLATELET # BLD AUTO: 240 10*3/MM3 (ref 140–450)
PMV BLD AUTO: 11.8 FL (ref 6–12)
POTASSIUM SERPL-SCNC: 4.4 MMOL/L (ref 3.5–5.2)
PROT SERPL-MCNC: 7.5 G/DL (ref 6–8.5)
PROT UR QL STRIP: NEGATIVE
RBC # BLD AUTO: 3.97 10*6/MM3 (ref 3.77–5.28)
SODIUM SERPL-SCNC: 140 MMOL/L (ref 136–145)
SP GR UR STRIP: 1.01 (ref 1–1.03)
T-UPTAKE NFR SERPL: 1.06 TBI (ref 0.8–1.3)
T4 SERPL-MCNC: 4.83 MCG/DL (ref 4.5–11.7)
TIBC SERPL-MCNC: 331 MCG/DL (ref 298–536)
TRANSFERRIN SERPL-MCNC: 222 MG/DL (ref 200–360)
TRIGL SERPL-MCNC: 103 MG/DL (ref 0–150)
TSH SERPL DL<=0.05 MIU/L-ACNC: 1.01 UIU/ML (ref 0.27–4.2)
UROBILINOGEN UR QL STRIP: NORMAL
VLDLC SERPL-MCNC: 18 MG/DL (ref 5–40)
WBC # BLD AUTO: 8.63 10*3/MM3 (ref 3.4–10.8)

## 2021-10-19 PROCEDURE — 84436 ASSAY OF TOTAL THYROXINE: CPT | Performed by: NURSE PRACTITIONER

## 2021-10-19 PROCEDURE — 36415 COLL VENOUS BLD VENIPUNCTURE: CPT | Performed by: NURSE PRACTITIONER

## 2021-10-19 PROCEDURE — 80061 LIPID PANEL: CPT | Performed by: NURSE PRACTITIONER

## 2021-10-19 PROCEDURE — 90662 IIV NO PRSV INCREASED AG IM: CPT | Performed by: NURSE PRACTITIONER

## 2021-10-19 PROCEDURE — 82728 ASSAY OF FERRITIN: CPT | Performed by: NURSE PRACTITIONER

## 2021-10-19 PROCEDURE — 83540 ASSAY OF IRON: CPT | Performed by: NURSE PRACTITIONER

## 2021-10-19 PROCEDURE — 84466 ASSAY OF TRANSFERRIN: CPT | Performed by: NURSE PRACTITIONER

## 2021-10-19 PROCEDURE — 84479 ASSAY OF THYROID (T3 OR T4): CPT | Performed by: NURSE PRACTITIONER

## 2021-10-19 PROCEDURE — G0438 PPPS, INITIAL VISIT: HCPCS | Performed by: NURSE PRACTITIONER

## 2021-10-19 PROCEDURE — 80053 COMPREHEN METABOLIC PANEL: CPT | Performed by: NURSE PRACTITIONER

## 2021-10-19 PROCEDURE — G0008 ADMIN INFLUENZA VIRUS VAC: HCPCS | Performed by: NURSE PRACTITIONER

## 2021-10-19 PROCEDURE — 1159F MED LIST DOCD IN RCRD: CPT | Performed by: NURSE PRACTITIONER

## 2021-10-19 PROCEDURE — 99214 OFFICE O/P EST MOD 30 MIN: CPT | Performed by: NURSE PRACTITIONER

## 2021-10-19 PROCEDURE — 1170F FXNL STATUS ASSESSED: CPT | Performed by: NURSE PRACTITIONER

## 2021-10-19 PROCEDURE — 82306 VITAMIN D 25 HYDROXY: CPT | Performed by: NURSE PRACTITIONER

## 2021-10-19 PROCEDURE — 84443 ASSAY THYROID STIM HORMONE: CPT | Performed by: NURSE PRACTITIONER

## 2021-10-19 PROCEDURE — 85025 COMPLETE CBC W/AUTO DIFF WBC: CPT | Performed by: NURSE PRACTITIONER

## 2021-10-19 PROCEDURE — 81003 URINALYSIS AUTO W/O SCOPE: CPT | Performed by: NURSE PRACTITIONER

## 2021-10-19 RX ORDER — HYDROXYZINE PAMOATE 25 MG/1
25 CAPSULE ORAL 4 TIMES DAILY PRN
Qty: 180 CAPSULE | Refills: 1 | Status: SHIPPED | OUTPATIENT
Start: 2021-10-19 | End: 2022-02-21

## 2021-10-19 RX ORDER — FERROUS SULFATE 325(65) MG
1 TABLET ORAL DAILY
Qty: 90 TABLET | Refills: 1 | Status: SHIPPED | OUTPATIENT
Start: 2021-10-19 | End: 2022-02-21 | Stop reason: SDUPTHER

## 2021-10-19 RX ORDER — CARVEDILOL 3.12 MG/1
3.12 TABLET ORAL 2 TIMES DAILY WITH MEALS
Qty: 180 TABLET | Refills: 0 | Status: SHIPPED | OUTPATIENT
Start: 2021-10-19 | End: 2022-02-21 | Stop reason: SDUPTHER

## 2021-10-19 RX ORDER — PEDI MV NO.227/FERROUS SULFATE 10 MG
TABLET,CHEWABLE ORAL
COMMUNITY

## 2021-10-19 RX ORDER — ESCITALOPRAM OXALATE 20 MG/1
20 TABLET ORAL DAILY
Qty: 90 TABLET | Refills: 1 | Status: SHIPPED | OUTPATIENT
Start: 2021-10-19 | End: 2022-02-21 | Stop reason: SDUPTHER

## 2021-10-19 RX ORDER — FUROSEMIDE 20 MG/1
20 TABLET ORAL DAILY
Qty: 90 TABLET | Refills: 0 | Status: SHIPPED | OUTPATIENT
Start: 2021-10-19 | End: 2022-01-04 | Stop reason: SDUPTHER

## 2021-10-19 RX ORDER — CYCLOBENZAPRINE HCL 10 MG
10 TABLET ORAL 3 TIMES DAILY PRN
COMMUNITY
Start: 2021-09-14

## 2021-10-19 RX ORDER — HYDROXYZINE 50 MG/1
50 TABLET, FILM COATED ORAL 3 TIMES DAILY PRN
Qty: 270 TABLET | Refills: 1 | Status: SHIPPED | OUTPATIENT
Start: 2021-10-19 | End: 2022-02-21 | Stop reason: SDUPTHER

## 2021-10-19 NOTE — ASSESSMENT & PLAN NOTE
Well controlled, continue carvedilol and Lasix.  Encouraged patient to continue to monitor blood pressure at home and to call or return to clinic with consistent elevations greater than 130/80. Labs in clinic today to include CBC, CMP.

## 2021-10-19 NOTE — PROGRESS NOTES
"Chief Complaint  Med Refill and Flu Vaccine    Subjective     {Problem List  Visit Diagnosis   Encounters  Notes  Medications  Labs  Result Review Imaging  Media :23}     Ro Adorno presents to [facility]  Last gfr 27  haulk    Insomnia  Going to the gym swimming  Hasn't been taking meds      Labs  ua    Flu shot would like  Pneumonia had  Shingles would like         Call pharmacy      Objective   Vital Signs:   /72   Pulse 72   Temp 97 °F (36.1 °C)   Ht 152.4 cm (60\")   Wt 79.4 kg (175 lb)   SpO2 99%   BMI 34.18 kg/m²     Physical Exam   Result Review :{Labs  Result Review  Imaging  Med Tab  Media  Procedures :23}   {The following data was reviewed by (Optional):55227}  {Ambulatory Labs (Optional):23594}  {Data reviewed (Optional):79427:::1}          Assessment and Plan {CC Problem List  Visit Diagnosis   ROS  Review (Popup)  Health Maintenance  Quality  BestPractice  Medications  SmartSets  SnapShot Encounters  Media :23}   There are no diagnoses linked to this encounter.  {Time Spent (Optional):62115}  Follow Up {Instructions Charge Capture  Follow-up Communications :23}  No follow-ups on file.  Patient was given instructions and counseling regarding her condition or for health maintenance advice. Please see specific information pulled into the AVS if appropriate.       "

## 2021-10-19 NOTE — ASSESSMENT & PLAN NOTE
Anxiety/depression well controlled, continue Lexapro. Encouraged patient to continue to monitor. She will seek medical attention immediately if she feels that her mental health is deteriorating. Denies SI/HI. Will continue to monitor.

## 2021-10-19 NOTE — ASSESSMENT & PLAN NOTE
CMP in clinic today to assess GFR, UA per patient request since she has not been seeing nephrologist routinely.  Will determine if appointment is necessary based on results of labs.

## 2021-10-19 NOTE — PROGRESS NOTES
The ABCs of the Annual Wellness Visit  Subsequent Medicare Wellness Visit    Chief Complaint   Patient presents with   • Med Refill   • Flu Vaccine      Subjective    History of Present Illness:  Ro Adorno is a 66 y.o. female who presents for a Subsequent Medicare Wellness Visit.    The following portions of the patient's history were reviewed and   updated as appropriate: allergies, current medications, past family history, past medical history, past social history, past surgical history and problem list.    Compared to one year ago, the patient feels her physical   health is better.    Compared to one year ago, the patient feels her mental   health is better.    CKD-  Most recent GFR 27 per patient. Due for repeat labs. Has not seen Dr. Baum, nephrology, in quite some time.  Patient continues to take hydroxyzine for itching, states she tried to stop this and itching returned.  GFR has remained stable since bariatric surgery and associated weight loss.    HLD-  Managed with statin.  Well-tolerated, denies leg cramping.  Most recent LDL 82.    GERD-  Well-controlled with pantoprazole.    Anxiety/depression-  Well-controlled with Lexapro.  Patient states she is doing well on this dosage, does not feel like she needs medication changes.  She does not see a therapist.  Denies SI/HI.  Patient is no longer taking trazodone for insomnia, states she has been exercising and swimming more at the gym and has not needed medication.    Hyperthyroid-  Previously managed with methimazole, patient has been off of medication for greater than 2 years.  Thyroid ultrasound 1/2021, stable.    SEVEN-  Patient previously managed with CPAP, is awaiting new equipment since recall.    Iron deficiency-  Patient continues to take oral iron supplement once daily.    HTN-  Managed with Coreg and Lasix.  Patient reports home readings 110s/70s.  Denies chest pain, blurry vision, headache, leg swelling.    Patient up-to-date on  colonoscopy, DEXA and Pap.  Due for mammogram.      Recent Hospitalizations:  She was not admitted to the hospital during the last year.       Current Medical Providers:  Patient Care Team:  Maria Elena Mullins APRN as PCP - General (Nurse Practitioner)  John Baum MD as Consulting Physician (Nephrology)    Outpatient Medications Prior to Visit   Medication Sig Dispense Refill   • [employer] FIBER GUMMIES PO Take  by mouth.     • cyclobenzaprine (FLEXERIL) 10 MG tablet Take 10 mg by mouth 3 (Three) Times a Day As Needed.     • HYDROcodone-acetaminophen (NORCO) 7.5-325 MG per tablet Take 1 tablet by mouth Every 6 (Six) Hours As Needed.     • Multiple Vitamins-Minerals (MULTIVITAMIN ADULTS PO) Take 1 tablet by mouth Daily.     • Pediatric Multivitamins-Iron (Flintstones Complete) 18 MG chewable tablet chewable tablet [place] multi vitamin one oral one daily.   Active     • simvastatin (ZOCOR) 20 MG tablet Take 1 tablet by mouth Every Night. 90 tablet 1   • traZODone (DESYREL) 100 MG tablet      • carvedilol (COREG) 3.125 MG tablet      • escitalopram (LEXAPRO) 20 MG tablet TAKE 1 TABLET BY MOUTH EVERY DAY 90 tablet 0   • ferrous sulfate 325 (65 FE) MG tablet TAKE 1 TABLET BY MOUTH EVERY DAY 90 tablet 0   • furosemide (LASIX) 20 MG tablet TAKE 1 TABLET BY MOUTH EVERY DAY 90 tablet 0   • hydrOXYzine (ATARAX) 50 MG tablet Take 50 mg by mouth 3 (Three) Times a Day As Needed for Itching.     • hydrOXYzine pamoate (VISTARIL) 25 MG capsule hydroxyzine pamoate 25 mg capsule   TAKE 1 CAPSULE BY MOUTH EVERY 8 HOURS AS NEEDED PRURITUS     • calcium carbonate (TUMS) 500 MG chewable tablet Chew 1 tablet Daily.     • Inulin 2 g chewable tablet        No facility-administered medications prior to visit.       Opioid medication/s are on active medication list.  and I have evaluated her active treatment plan and pain score trends (see table).  There were no vitals filed for this visit.  I have reviewed the chart for potential of high risk  "medication and harmful drug interactions in the elderly.            Aspirin is not on active medication list.  Aspirin use is not indicated based on review of current medical condition/s. Risk of harm outweighs potential benefits.  .    Patient Active Problem List   Diagnosis   • Chronic fatigue   • Ankle swelling   • Essential hypertension   • Hyperlipidemia   • SEVEN (obstructive sleep apnea)   • GERD (gastroesophageal reflux disease)   • Multiple joint pain   • Anxiety   • Renal failure, chronic, stage 4 (severe) (HCC)   • Other kyphoscoliosis and scoliosis   • Vitamin D deficiency   • Secondary hyperparathyroidism (HCC)   • Dietary counseling   • S/P laparoscopic sleeve gastrectomy   • Obesity, Class I, BMI 30-34.9     Advance Care Planning  Advance Directive is not on file.  ACP discussion was held with the patient during this visit. Patient has an advance directive (not in EMR), copy requested.          Objective    Vitals:    10/19/21 1141   BP: 108/72   Pulse: 72   Temp: 97 °F (36.1 °C)   SpO2: 99%   Weight: 79.4 kg (175 lb)   Height: 152.4 cm (60\")     BMI Readings from Last 1 Encounters:   10/19/21 34.18 kg/m²   BMI is above normal parameters. Recommendations include: exercise counseling and nutrition counseling    Does the patient have evidence of cognitive impairment? No    Physical Exam  Constitutional:       Appearance: Normal appearance. She is normal weight.   HENT:      Head: Normocephalic and atraumatic.      Nose: Nose normal.      Mouth/Throat:      Mouth: Mucous membranes are moist.      Pharynx: Oropharynx is clear.   Eyes:      Extraocular Movements: Extraocular movements intact.      Conjunctiva/sclera: Conjunctivae normal.      Pupils: Pupils are equal, round, and reactive to light.   Neck:      Thyroid: No thyroid mass, thyromegaly or thyroid tenderness.   Cardiovascular:      Rate and Rhythm: Normal rate and regular rhythm.      Heart sounds: Normal heart sounds.   Pulmonary:      Effort: " Pulmonary effort is normal.      Breath sounds: Normal breath sounds.   Skin:     General: Skin is warm and dry.   Neurological:      General: No focal deficit present.      Mental Status: She is alert and oriented to person, place, and time.   Psychiatric:         Mood and Affect: Mood normal.         Behavior: Behavior normal.         Thought Content: Thought content normal.                 HEALTH RISK ASSESSMENT    Smoking Status:  Social History     Tobacco Use   Smoking Status Never Smoker   Smokeless Tobacco Never Used     Alcohol Consumption:  Social History     Substance and Sexual Activity   Alcohol Use Yes    Comment: HOLIDAYS AND VACATION     Fall Risk Screen:    [facility] Fall Risk Assessment was completed, and patient is at LOW risk for falls.Assessment completed on:10/19/2021    Depression Screening:  PHQ-2/PHQ-9 Depression Screening 10/19/2021   Little interest or pleasure in doing things 0   Feeling down, depressed, or hopeless 0   Total Score 0       Health Habits and Functional and Cognitive Screening:  Functional & Cognitive Status 10/19/2021   Do you have difficulty preparing food and eating? No   Do you have difficulty bathing yourself, getting dressed or grooming yourself? No   Do you have difficulty using the toilet? No   Do you have difficulty moving around from place to place? No   Do you have trouble with steps or getting out of a bed or a chair? No   Current Diet Well Balanced Diet   Dental Exam Up to date   Eye Exam Up to date   Exercise (times per week) 3 times per week   Current Exercises Include (No Data)        Exercise Comment swimming   Do you need help using the phone?  No   Are you deaf or do you have serious difficulty hearing?  No   Do you need help with transportation? No   Do you need help shopping? No   Do you need help preparing meals?  No   Do you need help with housework?  No   Do you need help with laundry? No   Do you need help taking your medications? No   Do you need help  managing money? No   Do you ever drive or ride in a car without wearing a seat belt? No   Have you felt unusual stress, anger or loneliness in the last month? No   Who do you live with? Spouse   If you need help, do you have trouble finding someone available to you? No   Do you have difficulty concentrating, remembering or making decisions? No       Age-appropriate Screening Schedule:  Refer to the list below for future screening recommendations based on patient's age, sex and/or medical conditions. Orders for these recommended tests are listed in the plan section. The patient has been provided with a written plan.    Health Maintenance   Topic Date Due   • ZOSTER VACCINE (1 of 2) Never done   • PAP SMEAR  Never done   • LIPID PANEL  01/05/2022   • MAMMOGRAM  11/25/2022   • DXA SCAN  11/25/2022   • TDAP/TD VACCINES (2 - Td or Tdap) 12/29/2027   • INFLUENZA VACCINE  Completed              Assessment/Plan   CMS Preventative Services Quick Reference  Risk Factors Identified During Encounter  Obesity/Overweight   The above risks/problems have been discussed with the patient.  Follow up actions/plans if indicated are seen below in the Assessment/Plan Section.  Pertinent information has been shared with the patient in the After Visit Summary.    Diagnoses and all orders for this visit:    1. Hyperthyroidism (Primary)  -     CBC & Differential  -     Thyroid Panel With TSH    2. SEVEN (obstructive sleep apnea)    3. Anxiety    4. Major depressive disorder, recurrent, mild (HCC)    5. Essential hypertension  -     Comprehensive Metabolic Panel  -     CBC & Differential    6. Gastroesophageal reflux disease without esophagitis    7. Hyperlipidemia, unspecified hyperlipidemia type  -     Lipid Panel    8. Stage 4 chronic kidney disease (HCC)  -     Urinalysis With Culture If Indicated - Urine, Clean Catch    9. Vitamin D deficiency  -     Vitamin D 25 hydroxy    10. Iron deficiency  -     Iron and TIBC  -     Ferritin    11.  Visit for screening mammogram  -     Mammo Screening Bilateral With CAD; Future    12. Flu vaccine need  -     Fluzone High-Dose 65+yrs ([id number])    Other orders  -     furosemide (LASIX) 20 MG tablet; Take 1 tablet by mouth Daily.  Dispense: 90 tablet; Refill: 0  -     carvedilol (COREG) 3.125 MG tablet; Take 1 tablet by mouth 2 (Two) Times a Day With Meals.  Dispense: 180 tablet; Refill: 0  -     hydrOXYzine pamoate (VISTARIL) 25 MG capsule; Take 1 capsule by mouth 4 (Four) Times a Day As Needed for Itching.  Dispense: 180 capsule; Refill: 1  -     ferrous sulfate 325 (65 FE) MG tablet; Take 1 tablet by mouth Daily.  Dispense: 90 tablet; Refill: 1  -     escitalopram (LEXAPRO) 20 MG tablet; Take 1 tablet by mouth Daily.  Dispense: 90 tablet; Refill: 1  -     hydrOXYzine (ATARAX) 50 MG tablet; Take 1 tablet by mouth 3 (Three) Times a Day As Needed for Itching.  Dispense: 270 tablet; Refill: 1        Follow Up:   Return in about 4 months (around 2/19/2022).     An After Visit Summary and PPPS were made available to the patient.

## 2021-10-26 ENCOUNTER — PRIOR AUTHORIZATION (OUTPATIENT)
Dept: INTERNAL MEDICINE | Facility: CLINIC | Age: 66
End: 2021-10-26

## 2021-12-09 ENCOUNTER — OFFICE VISIT (OUTPATIENT)
Dept: INTERNAL MEDICINE | Facility: CLINIC | Age: 66
End: 2021-12-09

## 2021-12-09 ENCOUNTER — HOSPITAL ENCOUNTER (EMERGENCY)
Facility: HOSPITAL | Age: 66
Discharge: HOME | End: 2021-12-09
Attending: EMERGENCY MEDICINE | Admitting: EMERGENCY MEDICINE

## 2021-12-09 ENCOUNTER — APPOINTMENT (OUTPATIENT)
Dept: GENERAL RADIOLOGY | Facility: HOSPITAL | Age: 66
End: 2021-12-09

## 2021-12-09 VITALS
DIASTOLIC BLOOD PRESSURE: 100 MMHG | HEART RATE: 78 BPM | OXYGEN SATURATION: 93 % | BODY MASS INDEX: 34.18 KG/M2 | SYSTOLIC BLOOD PRESSURE: 140 MMHG | TEMPERATURE: 97.8 F | WEIGHT: 175 LBS | RESPIRATION RATE: 16 BRPM

## 2021-12-09 VITALS
RESPIRATION RATE: 16 BRPM | HEIGHT: 60 IN | DIASTOLIC BLOOD PRESSURE: 98 MMHG | WEIGHT: 180 LBS | HEART RATE: 78 BPM | BODY MASS INDEX: 35.34 KG/M2 | SYSTOLIC BLOOD PRESSURE: 184 MMHG | OXYGEN SATURATION: 94 % | TEMPERATURE: 97.9 F

## 2021-12-09 DIAGNOSIS — R07.9 CHEST PAIN, UNSPECIFIED TYPE: ICD-10-CM

## 2021-12-09 DIAGNOSIS — U07.1 COVID-19 VIRUS DETECTED: Primary | ICD-10-CM

## 2021-12-09 DIAGNOSIS — I10 ESSENTIAL HYPERTENSION: ICD-10-CM

## 2021-12-09 DIAGNOSIS — B34.2 CORONAVIRUS INFECTION: Primary | ICD-10-CM

## 2021-12-09 DIAGNOSIS — R50.9 FEVER, UNSPECIFIED FEVER CAUSE: ICD-10-CM

## 2021-12-09 DIAGNOSIS — R05.9 COUGH: ICD-10-CM

## 2021-12-09 DIAGNOSIS — R11.2 NAUSEA AND VOMITING, INTRACTABILITY OF VOMITING NOT SPECIFIED, UNSPECIFIED VOMITING TYPE: ICD-10-CM

## 2021-12-09 DIAGNOSIS — R53.1 WEAKNESS: ICD-10-CM

## 2021-12-09 LAB
ALBUMIN SERPL-MCNC: 4.1 G/DL (ref 3.5–5.2)
ALBUMIN/GLOB SERPL: 1.3 G/DL
ALP SERPL-CCNC: 101 U/L (ref 39–117)
ALT SERPL W P-5'-P-CCNC: 75 U/L (ref 1–33)
ANION GAP SERPL CALCULATED.3IONS-SCNC: 12.5 MMOL/L (ref 5–15)
AST SERPL-CCNC: 78 U/L (ref 1–32)
BASOPHILS # BLD AUTO: 0.04 10*3/MM3 (ref 0–0.2)
BASOPHILS NFR BLD AUTO: 0.5 % (ref 0–1.5)
BILIRUB SERPL-MCNC: 0.2 MG/DL (ref 0–1.2)
BILIRUB UR QL STRIP: NEGATIVE
BUN SERPL-MCNC: 35 MG/DL (ref 8–23)
BUN/CREAT SERPL: 15.6 (ref 7–25)
CALCIUM SPEC-SCNC: 9.4 MG/DL (ref 8.6–10.5)
CHLORIDE SERPL-SCNC: 98 MMOL/L (ref 98–107)
CLARITY UR: CLEAR
CO2 SERPL-SCNC: 24.5 MMOL/L (ref 22–29)
COLOR UR: YELLOW
CREAT SERPL-MCNC: 2.25 MG/DL (ref 0.57–1)
DEPRECATED RDW RBC AUTO: 43 FL (ref 37–54)
EOSINOPHIL # BLD AUTO: 0.03 10*3/MM3 (ref 0–0.4)
EOSINOPHIL NFR BLD AUTO: 0.4 % (ref 0.3–6.2)
ERYTHROCYTE [DISTWIDTH] IN BLOOD BY AUTOMATED COUNT: 13.1 % (ref 12.3–15.4)
EXPIRATION DATE: ABNORMAL
FLUAV AG UPPER RESP QL IA.RAPID: NOT DETECTED
FLUBV AG UPPER RESP QL IA.RAPID: NOT DETECTED
GFR SERPL CREATININE-BSD FRML MDRD: 22 ML/MIN/1.73
GLOBULIN UR ELPH-MCNC: 3.1 GM/DL
GLUCOSE SERPL-MCNC: 121 MG/DL (ref 65–99)
GLUCOSE UR STRIP-MCNC: NEGATIVE MG/DL
HCT VFR BLD AUTO: 37.3 % (ref 34–46.6)
HGB BLD-MCNC: 12.4 G/DL (ref 12–15.9)
HGB UR QL STRIP.AUTO: NEGATIVE
HOLD SPECIMEN: NORMAL
HOLD SPECIMEN: NORMAL
IMM GRANULOCYTES # BLD AUTO: 0.04 10*3/MM3 (ref 0–0.05)
IMM GRANULOCYTES NFR BLD AUTO: 0.5 % (ref 0–0.5)
INTERNAL CONTROL: ABNORMAL
KETONES UR QL STRIP: NEGATIVE
LEUKOCYTE ESTERASE UR QL STRIP.AUTO: NEGATIVE
LIPASE SERPL-CCNC: 49 U/L (ref 13–60)
LYMPHOCYTES # BLD AUTO: 1.36 10*3/MM3 (ref 0.7–3.1)
LYMPHOCYTES NFR BLD AUTO: 17.6 % (ref 19.6–45.3)
Lab: ABNORMAL
MCH RBC QN AUTO: 30 PG (ref 26.6–33)
MCHC RBC AUTO-ENTMCNC: 33.2 G/DL (ref 31.5–35.7)
MCV RBC AUTO: 90.1 FL (ref 79–97)
MONOCYTES # BLD AUTO: 0.72 10*3/MM3 (ref 0.1–0.9)
MONOCYTES NFR BLD AUTO: 9.3 % (ref 5–12)
NEUTROPHILS NFR BLD AUTO: 5.53 10*3/MM3 (ref 1.7–7)
NEUTROPHILS NFR BLD AUTO: 71.7 % (ref 42.7–76)
NITRITE UR QL STRIP: NEGATIVE
NRBC BLD AUTO-RTO: 0 /100 WBC (ref 0–0.2)
PH UR STRIP.AUTO: 5.5 [PH] (ref 5–8)
PLATELET # BLD AUTO: 186 10*3/MM3 (ref 140–450)
PMV BLD AUTO: 10.6 FL (ref 6–12)
POTASSIUM SERPL-SCNC: 4.1 MMOL/L (ref 3.5–5.2)
PROT SERPL-MCNC: 7.2 G/DL (ref 6–8.5)
PROT UR QL STRIP: NEGATIVE
RBC # BLD AUTO: 4.14 10*6/MM3 (ref 3.77–5.28)
SARS-COV-2 AG UPPER RESP QL IA.RAPID: DETECTED
SODIUM SERPL-SCNC: 135 MMOL/L (ref 136–145)
SP GR UR STRIP: 1.01 (ref 1–1.03)
UROBILINOGEN UR QL STRIP: NORMAL
WBC NRBC COR # BLD: 7.72 10*3/MM3 (ref 3.4–10.8)
WHOLE BLOOD HOLD SPECIMEN: NORMAL
WHOLE BLOOD HOLD SPECIMEN: NORMAL

## 2021-12-09 PROCEDURE — 81003 URINALYSIS AUTO W/O SCOPE: CPT | Performed by: EMERGENCY MEDICINE

## 2021-12-09 PROCEDURE — 96375 TX/PRO/DX INJ NEW DRUG ADDON: CPT

## 2021-12-09 PROCEDURE — 99283 EMERGENCY DEPT VISIT LOW MDM: CPT

## 2021-12-09 PROCEDURE — 80053 COMPREHEN METABOLIC PANEL: CPT

## 2021-12-09 PROCEDURE — 25010000002 DEXAMETHASONE PER 1 MG: Performed by: EMERGENCY MEDICINE

## 2021-12-09 PROCEDURE — 93000 ELECTROCARDIOGRAM COMPLETE: CPT | Performed by: PHYSICIAN ASSISTANT

## 2021-12-09 PROCEDURE — 71045 X-RAY EXAM CHEST 1 VIEW: CPT

## 2021-12-09 PROCEDURE — 25010000002 FENTANYL CITRATE (PF) 50 MCG/ML SOLUTION: Performed by: EMERGENCY MEDICINE

## 2021-12-09 PROCEDURE — 85025 COMPLETE CBC W/AUTO DIFF WBC: CPT

## 2021-12-09 PROCEDURE — 83690 ASSAY OF LIPASE: CPT

## 2021-12-09 PROCEDURE — 96374 THER/PROPH/DIAG INJ IV PUSH: CPT

## 2021-12-09 PROCEDURE — 36415 COLL VENOUS BLD VENIPUNCTURE: CPT

## 2021-12-09 PROCEDURE — 87428 SARSCOV & INF VIR A&B AG IA: CPT | Performed by: PHYSICIAN ASSISTANT

## 2021-12-09 PROCEDURE — 25010000002 ONDANSETRON PER 1 MG: Performed by: EMERGENCY MEDICINE

## 2021-12-09 PROCEDURE — 99214 OFFICE O/P EST MOD 30 MIN: CPT | Performed by: PHYSICIAN ASSISTANT

## 2021-12-09 PROCEDURE — 94640 AIRWAY INHALATION TREATMENT: CPT

## 2021-12-09 RX ORDER — ALBUTEROL SULFATE 90 UG/1
2 AEROSOL, METERED RESPIRATORY (INHALATION) EVERY 4 HOURS PRN
Qty: 1 G | Refills: 0 | Status: SHIPPED | OUTPATIENT
Start: 2021-12-09 | End: 2022-02-21

## 2021-12-09 RX ORDER — SODIUM CHLORIDE 0.9 % (FLUSH) 0.9 %
10 SYRINGE (ML) INJECTION AS NEEDED
Status: DISCONTINUED | OUTPATIENT
Start: 2021-12-09 | End: 2021-12-09 | Stop reason: HOSPADM

## 2021-12-09 RX ORDER — ONDANSETRON 2 MG/ML
4 INJECTION INTRAMUSCULAR; INTRAVENOUS ONCE
Status: COMPLETED | OUTPATIENT
Start: 2021-12-09 | End: 2021-12-09

## 2021-12-09 RX ORDER — IPRATROPIUM BROMIDE AND ALBUTEROL SULFATE 2.5; .5 MG/3ML; MG/3ML
3 SOLUTION RESPIRATORY (INHALATION) ONCE
Status: COMPLETED | OUTPATIENT
Start: 2021-12-09 | End: 2021-12-09

## 2021-12-09 RX ORDER — PREDNISONE 20 MG/1
TABLET ORAL
Qty: 15 TABLET | Refills: 0 | Status: SHIPPED | OUTPATIENT
Start: 2021-12-09 | End: 2022-02-21

## 2021-12-09 RX ORDER — ONDANSETRON 4 MG/1
4 TABLET, ORALLY DISINTEGRATING ORAL EVERY 8 HOURS PRN
Qty: 10 TABLET | Refills: 0 | Status: SHIPPED | OUTPATIENT
Start: 2021-12-09 | End: 2022-02-21

## 2021-12-09 RX ORDER — DEXAMETHASONE SODIUM PHOSPHATE 10 MG/ML
10 INJECTION INTRAMUSCULAR; INTRAVENOUS ONCE
Status: COMPLETED | OUTPATIENT
Start: 2021-12-09 | End: 2021-12-09

## 2021-12-09 RX ORDER — FENTANYL CITRATE 50 UG/ML
50 INJECTION, SOLUTION INTRAMUSCULAR; INTRAVENOUS ONCE
Status: COMPLETED | OUTPATIENT
Start: 2021-12-09 | End: 2021-12-09

## 2021-12-09 RX ADMIN — SODIUM CHLORIDE 1000 ML: 9 INJECTION, SOLUTION INTRAVENOUS at 19:29

## 2021-12-09 RX ADMIN — IPRATROPIUM BROMIDE AND ALBUTEROL SULFATE 3 ML: .5; 2.5 SOLUTION RESPIRATORY (INHALATION) at 19:16

## 2021-12-09 RX ADMIN — ONDANSETRON 4 MG: 2 INJECTION INTRAMUSCULAR; INTRAVENOUS at 19:29

## 2021-12-09 RX ADMIN — DEXAMETHASONE SODIUM PHOSPHATE 10 MG: 10 INJECTION INTRAMUSCULAR; INTRAVENOUS at 19:30

## 2021-12-09 RX ADMIN — FENTANYL CITRATE 50 MCG: 50 INJECTION INTRAMUSCULAR; INTRAVENOUS at 19:30

## 2021-12-09 NOTE — ED PROVIDER NOTES
Time: 6:19 PM EST  Arrived by: private car  Chief Complaint: Covid and weakness  History provided by: patient  History is limited by: nothing  History of Present Illness:  Patient is a 66 y.o. year old female that presents to the emergency department with a recent diagnosis of COVID .  She has had upper respiratory symptoms and cough over the past few days.  She has received COVID vaccine and her main complaint today is weakness.  She has had nausea but no vomiting and she has no shortness of breath      History provided by:  Patient      Similar Symptoms Previously: yes  Recently seen:yes    Patient Care Team  Primary Care Provider: Maria Elena Mullins APRN    Past Medical History:     Allergies   Allergen Reactions   • Contrast Dye Other (See Comments)     CHRONIC KIDNEY DISEASE STAGE IV   • Nsaids Other (See Comments)     Cannot take due to kidneys     Past Medical History:   Diagnosis Date   • Acid reflux    • Anemia    • Arthritis     OSTEO   • CKD (chronic kidney disease) stage 4, GFR 15-29 ml/min ([facility])    • DDD (degenerative disc disease), cervical    • DDD (degenerative disc disease), lumbosacral    • DDD (degenerative disc disease), thoracolumbar    • Hyperlipidemia    • Hypertension    • Sleep apnea     CPAP     Past Surgical History:   Procedure Laterality Date   • BACK SURGERY      BURNED NERVE TO RELIEVE PAIN   • COLONOSCOPY  2014   • ENDOSCOPY  2009   • GASTRIC SLEEVE LAPAROSCOPIC N/A 10/30/2019    Procedure: GASTRIC SLEEVE LAPAROSCOPIC With Hiatal Hernia Repair;  Surgeon: Dane Delgado Jr., MD;  Location: [facility] OR [facility] [place];  Service: Bariatric     Family History   Problem Relation Age of Onset   • Obesity Mother    • Hypertension Mother    • Obesity Sister    • Hypertension Sister    • Stroke Sister    • Obesity Brother    • Hypertension Brother    • Heart attack Brother    • Obesity Maternal Grandmother    • Hypertension Maternal Grandmother    • Cancer Maternal Grandmother         BRAIN   • Cancer  Maternal Grandfather    • Hypertension Paternal Grandmother    • Hypertension Brother    • Hypertension Sister    • Stroke Sister    • Malig Hyperthermia Neg Hx        Home Medications:  Prior to Admission medications    Medication Sig Start Date End Date Taking? Authorizing Provider   calcium carbonate (TUMS) 500 MG chewable tablet Chew 1 tablet Daily.    Leonidas Boyd MD   carvedilol (COREG) 3.125 MG tablet Take 1 tablet by mouth 2 (Two) Times a Day With Meals. 10/19/21   Maria Elena Mullins APRN   [employer] FIBER GUMMIES PO Take  by mouth.    Leonidas Boyd MD   cyclobenzaprine (FLEXERIL) 10 MG tablet Take 10 mg by mouth 3 (Three) Times a Day As Needed. 9/14/21   Leonidas Boyd MD   escitalopram (LEXAPRO) 20 MG tablet Take 1 tablet by mouth Daily. 10/19/21   Maria Elena Mullins APRN   ferrous sulfate 325 (65 FE) MG tablet Take 1 tablet by mouth Daily. 10/19/21   Maria Elena Mullins APRN   furosemide (LASIX) 20 MG tablet Take 1 tablet by mouth Daily. 10/19/21   Maria Elena Mullins APRN   HYDROcodone-acetaminophen (NORCO) 7.5-325 MG per tablet Take 1 tablet by mouth Every 6 (Six) Hours As Needed.    Leonidas Boyd MD   hydrOXYzine (ATARAX) 50 MG tablet Take 1 tablet by mouth 3 (Three) Times a Day As Needed for Itching. 10/19/21   Maria Elena Mullins APRN   hydrOXYzine pamoate (VISTARIL) 25 MG capsule Take 1 capsule by mouth 4 (Four) Times a Day As Needed for Itching. 10/19/21   Maria Elena Mullins APRN   Inulin 2 g chewable tablet     Leonidas Boyd MD   Multiple Vitamins-Minerals (MULTIVITAMIN ADULTS PO) Take 1 tablet by mouth Daily.    Leonidas Boyd MD   Pediatric Multivitamins-Iron (Flintstones Complete) 18 MG chewable tablet chewable tablet [place] multi vitamin one oral one daily.   Active    Leonidas Boyd MD   simvastatin (ZOCOR) 20 MG tablet Take 1 tablet by mouth Every Night. 10/4/21   Maria Elena Mullins APRN   traZODone (DESYREL) 100 MG tablet  5/1/20   Leonidas Boyd MD     "    Social History:   Social History     Tobacco Use   • Smoking status: Never Smoker   • Smokeless tobacco: Never Used   Substance Use Topics   • Alcohol use: Yes     Comment: HOLIDAYS AND VACATION   • Drug use: No     Recent travel: no     Review of Systems:  Review of Systems   Constitutional: Positive for activity change, appetite change, chills, fatigue and fever. Negative for diaphoresis.   HENT: Positive for congestion and rhinorrhea. Negative for ear pain, facial swelling, hearing loss, mouth sores, sneezing, tinnitus, trouble swallowing and voice change.    Eyes: Negative.    Respiratory: Positive for cough. Negative for apnea, choking, chest tightness, shortness of breath, wheezing and stridor.    Cardiovascular: Negative.    Gastrointestinal: Negative.    Endocrine: Negative.    Genitourinary: Negative.    Musculoskeletal: Negative.    Skin: Negative.    Allergic/Immunologic: Negative.    Neurological: Negative.    Hematological: Negative.    Psychiatric/Behavioral: Negative.         Physical Exam:  BP (!) 184/98   Pulse 78   Temp 97.9 °F (36.6 °C) (Oral)   Resp 16   Ht 152.4 cm (60\")   Wt 81.6 kg (180 lb)   SpO2 94%   BMI 35.15 kg/m²     Physical Exam  Vitals and nursing note reviewed.   Constitutional:       Appearance: Normal appearance.   HENT:      Head: Normocephalic and atraumatic.      Right Ear: External ear normal.      Left Ear: External ear normal.      Nose: Nose normal.      Mouth/Throat:      Mouth: Mucous membranes are moist.      Pharynx: Oropharynx is clear.   Eyes:      Extraocular Movements: Extraocular movements intact.      Conjunctiva/sclera: Conjunctivae normal.      Pupils: Pupils are equal, round, and reactive to light.   Cardiovascular:      Rate and Rhythm: Normal rate and regular rhythm.      Pulses: Normal pulses.      Heart sounds: Normal heart sounds.   Pulmonary:      Effort: Pulmonary effort is normal.      Breath sounds: Normal breath sounds.   Abdominal:      " General: Abdomen is flat. Bowel sounds are normal.      Palpations: Abdomen is soft.   Musculoskeletal:         General: Normal range of motion.      Cervical back: Normal range of motion and neck supple.   Skin:     General: Skin is warm and dry.      Capillary Refill: Capillary refill takes less than 2 seconds.   Neurological:      General: No focal deficit present.      Mental Status: She is alert and oriented to person, place, and time. Mental status is at baseline.   Psychiatric:         Mood and Affect: Mood normal.         Behavior: Behavior normal.         Thought Content: Thought content normal.         Judgment: Judgment normal.                Medications in the Emergency Department:  Medications   sodium chloride 0.9 % bolus 1,000 mL (0 mL Intravenous Stopped 12/9/21 2051)   dexamethasone (DECADRON) injection 10 mg (10 mg Intravenous Given 12/9/21 1930)   ondansetron (ZOFRAN) injection 4 mg (4 mg Intravenous Given 12/9/21 1929)   fentaNYL citrate (PF) (SUBLIMAZE) injection 50 mcg (50 mcg Intravenous Given 12/9/21 1930)   ipratropium-albuterol (DUO-NEB) nebulizer solution 3 mL (3 mL Nebulization Given 12/9/21 1916)        Labs    Labs Reviewed   COMPREHENSIVE METABOLIC PANEL - Abnormal; Notable for the following components:       Result Value    Glucose 121 (*)     BUN 35 (*)     Creatinine 2.25 (*)     Sodium 135 (*)     ALT (SGPT) 75 (*)     AST (SGOT) 78 (*)     eGFR Non  Amer 22 (*)     All other components within normal limits    Narrative:     GFR Normal >60  Chronic Kidney Disease <60  Kidney Failure <15     CBC WITH AUTO DIFFERENTIAL - Abnormal; Notable for the following components:    Lymphocyte % 17.6 (*)     All other components within normal limits   LIPASE - Normal   URINALYSIS W/ CULTURE IF INDICATED - Normal    Narrative:     Urine microscopic not indicated.   RAINBOW DRAW    Narrative:     The following orders were created for panel order [place] Draw.  Procedure                                Abnormality         Status                     ---------                               -----------         ------                     Green Top (Gel)[[id number]]                                  Final result               Lavender Top[[id number]]                                     Final result               Gold Top - SST[[id number]]                                   Final result               Light Blue Top[[id number]]                                   Final result                 Please view results for these tests on the individual orders.   CBC AND DIFFERENTIAL    Narrative:     The following orders were created for panel order CBC & Differential.  Procedure                               Abnormality         Status                     ---------                               -----------         ------                     CBC Auto Differential[[id number]]        Abnormal            Final result                 Please view results for these tests on the individual orders.   GREEN TOP   LAVENDER TOP   GOLD TOP - SST   LIGHT BLUE TOP         XR Chest 1 View    Result Date: 12/9/2021  Narrative: PROCEDURE: XR CHEST 1 VW  COMPARISON: [facility], , CHEST AP/PA 1 VIEW, 12/20/2018, 13:44.  INDICATIONS: Fever and cough  FINDINGS:  No new consolidations or pleural effusions are observed. The cardiac silhouette and mediastinum are unchanged. No definitive acute osseous abnormalities are seen on this single view.  CONCLUSION:  1. No change from the previous study with no evidence for acute cardiopulmonary process.         CARLINE GONZALEZ MD       Electronically Signed and Approved By: CARLINE GONZALEZ MD on 12/09/2021 at 19:02               Lab Results (last 24 hours)     ** No results found for the last 24 hours. **           Imaging:  No Radiology Exams Resulted Within Past 24 Hours    Procedures:  Procedures    Progress                            Medical Decision Making:  MDM  Number of Diagnoses or  Management Options  Coronavirus infection  Diagnosis management comments: The patient is COVID positive and she had received full compliment of vaccine prior to this illness.  She is here mainly for fatigue and her vital signs/ pulse oximeter are all stable.  She will not need admission and she will be discharged home       Amount and/or Complexity of Data Reviewed  Clinical lab tests: reviewed  Tests in the radiology section of CPT®: reviewed  Decide to obtain previous medical records or to obtain history from someone other than the patient: yes  Review and summarize past medical records: yes  Independent visualization of images, tracings, or specimens: yes    Patient Progress  Patient progress: improved       Final diagnoses:   Coronavirus infection        Disposition:  ED Disposition     ED Disposition Condition Comment    Discharge Stable Pt d/c'ed stable, in wheelchair. IV cath removed and intact.           This medical record created using voice recognition software and may contain unintended errors.           Eduardo Zazueta, DO  12/11/21 2048

## 2021-12-09 NOTE — PROGRESS NOTES
Chief Complaint  Fever    Subjective          Ro Adorno presents to [facility] MEDIC[facility] & [facility]ED[facility]ATRICS  Cough x 3 days.   Cough is wet   Denies wheezing  She feels sob at rest. Feels like a ''band around chest''   Chest feels tight.  Denies chest pain that radiates to jaw or shoulder  Room spinning since yesterday. Feels like equilibrum is off. She is unable to stand due to dizziness and weakness.  Nausea started this am and she has thrown up 3 x.  HA started after vomiting. It is all over head. Denies worst ha of life, unilateral weakness, slurred speech, confusion.    Fever- low grade at home around 99F  She has taken dayquil, sudafed, delsym   She is drinking water  Appetite decreased   Urinating normally. Denies pain with urination   Denies sick contacts      Past Medical History:   Diagnosis Date   • Acid reflux    • Anemia    • Arthritis     OSTEO   • CKD (chronic kidney disease) stage 4, GFR 15-29 ml/min ([facility])    • DDD (degenerative disc disease), cervical    • DDD (degenerative disc disease), lumbosacral    • DDD (degenerative disc disease), thoracolumbar    • Hyperlipidemia    • Hypertension    • Sleep apnea     CPAP        Past Surgical History:   Procedure Laterality Date   • BACK SURGERY      BURNED NERVE TO RELIEVE PAIN   • COLONOSCOPY  2014   • ENDOSCOPY  2009   • GASTRIC SLEEVE LAPAROSCOPIC N/A 10/30/2019    Procedure: GASTRIC SLEEVE LAPAROSCOPIC With Hiatal Hernia Repair;  Surgeon: Dane Delgado Jr., MD;  [place] [facility] [place] [facility] [place]  Service: Bariatric        Current Outpatient Medications on File Prior to Visit   Medication Sig Dispense Refill   • calcium carbonate (TUMS) 500 MG chewable tablet Chew 1 tablet Daily.     • carvedilol (COREG) 3.125 MG tablet Take 1 tablet by mouth 2 (Two) Times a Day With Meals. 180 tablet 0   • [employer] FIBER GUMMIES PO Take  by mouth.     • cyclobenzaprine (FLEXERIL) 10 MG tablet Take 10 mg by mouth 3 (Three) Times a Day As  Needed.     • escitalopram (LEXAPRO) 20 MG tablet Take 1 tablet by mouth Daily. 90 tablet 1   • ferrous sulfate 325 (65 FE) MG tablet Take 1 tablet by mouth Daily. 90 tablet 1   • furosemide (LASIX) 20 MG tablet Take 1 tablet by mouth Daily. 90 tablet 0   • HYDROcodone-acetaminophen (NORCO) 7.5-325 MG per tablet Take 1 tablet by mouth Every 6 (Six) Hours As Needed.     • hydrOXYzine (ATARAX) 50 MG tablet Take 1 tablet by mouth 3 (Three) Times a Day As Needed for Itching. 270 tablet 1   • Inulin 2 g chewable tablet      • Multiple Vitamins-Minerals (MULTIVITAMIN ADULTS PO) Take 1 tablet by mouth Daily.     • simvastatin (ZOCOR) 20 MG tablet Take 1 tablet by mouth Every Night. 90 tablet 1   • traZODone (DESYREL) 100 MG tablet      • hydrOXYzine pamoate (VISTARIL) 25 MG capsule Take 1 capsule by mouth 4 (Four) Times a Day As Needed for Itching. 180 capsule 1   • Pediatric Multivitamins-Iron (Flintstones Complete) 18 MG chewable tablet chewable tablet [place] multi vitamin one oral one daily.   Active       No current facility-administered medications on file prior to visit.        Allergies   Allergen Reactions   • Contrast Dye Other (See Comments)     CHRONIC KIDNEY DISEASE STAGE IV   • Nsaids Other (See Comments)     Cannot take due to kidneys       Social History     Tobacco Use   Smoking Status Never Smoker   Smokeless Tobacco Never Used          Objective   Vital Signs:   /100   Pulse 78   Temp 97.8 °F (36.6 °C)   Resp 16   Wt 79.4 kg (175 lb)   SpO2 93%   BMI 34.18 kg/m²     Physical Exam  Vitals reviewed.   Constitutional:       Appearance: Normal appearance. She is ill-appearing.   HENT:      Head: Normocephalic and atraumatic.      Nose: Nose normal.      Mouth/Throat:      Mouth: Mucous membranes are moist.   Eyes:      Extraocular Movements: Extraocular movements intact.      Conjunctiva/sclera: Conjunctivae normal.      Pupils: Pupils are equal, round, and reactive to light.   Cardiovascular:       Rate and Rhythm: Normal rate and regular rhythm.      Pulses: Normal pulses.      Heart sounds: Normal heart sounds.   Pulmonary:      Effort: Pulmonary effort is normal.      Breath sounds: Normal breath sounds.   Abdominal:      General: Abdomen is flat. Bowel sounds are normal.      Palpations: Abdomen is soft.   Musculoskeletal:         General: Normal range of motion.   Neurological:      General: No focal deficit present.      Mental Status: She is alert and oriented to person, place, and time.      Motor: Weakness (unable to stand) present.   Psychiatric:         Mood and Affect: Mood normal.        Result Review :            ECG 12 Lead    Date/Time: 12/9/2021 11:51 AM  Performed by: Candace Dyson PA-C  Authorized by: Candace Dyson PA-C   Comparison: compared with previous ECG from 10/22/2019  Similar to previous ECG  Rhythm: sinus rhythm  Rate: normal  BPM: 71  ST Segments: ST segments normal  T Waves: T waves normal    Clinical impression: non-specific ECG              Assessment and Plan    Diagnoses and all orders for this visit:    1. COVID-19 virus detected (Primary)  Assessment & Plan:  Discussed covid positive. Pt sent to ER. Pt declined ER transport,  agreed to take her to ER. Report given to ER RN.      2. Fever, unspecified fever cause  -     POCT SARS-CoV-2 Antigen GALILEA    3. Cough  -     XR Chest PA & Lateral    4. Essential hypertension    5. Chest pain, unspecified type  Comments:  EKG WNL. Discussed concern for need for CT chest with covid positive and severe sx, pt sent to ER.    6. Weakness    7. Nausea and vomiting, intractability of vomiting not specified, unspecified vomiting type    Other orders  -     ECG 12 Lead    I spent 40 minutes caring for Ro on this date of service. This time includes time spent by me in the following activities:reviewing tests, obtaining and/or reviewing a separately obtained history, performing a medically appropriate examination and/or  evaluation , counseling and educating the patient/family/caregiver, ordering medications, tests, or procedures, documenting information in the medical record and care coordination  Follow Up   Return if symptoms worsen or fail to improve.  Patient was given instructions and counseling regarding her condition or for health maintenance advice. Please see specific information pulled into the AVS if appropriate.

## 2021-12-09 NOTE — ASSESSMENT & PLAN NOTE
Discussed covid positive. Pt sent to ER. Pt declined ER transport,  agreed to take her to ER. Report given to ER RN.

## 2021-12-10 NOTE — DISCHARGE INSTRUCTIONS
Drink plenty of fluids.  Take medications as directed.  Take Tylenol ibuprofen as needed for fever.  Return for worsening symptoms.  Follow-up with your doctor in 2 days if no better.

## 2021-12-18 ENCOUNTER — HOSPITAL ENCOUNTER (EMERGENCY)
Facility: HOSPITAL | Age: 66
Discharge: HOME | End: 2021-12-18
Attending: EMERGENCY MEDICINE | Admitting: EMERGENCY MEDICINE

## 2021-12-18 ENCOUNTER — APPOINTMENT (OUTPATIENT)
Dept: GENERAL RADIOLOGY | Facility: HOSPITAL | Age: 66
End: 2021-12-18

## 2021-12-18 VITALS
DIASTOLIC BLOOD PRESSURE: 97 MMHG | SYSTOLIC BLOOD PRESSURE: 148 MMHG | HEIGHT: 60 IN | TEMPERATURE: 99.1 F | HEART RATE: 81 BPM | WEIGHT: 180.78 LBS | BODY MASS INDEX: 35.49 KG/M2 | RESPIRATION RATE: 21 BRPM | OXYGEN SATURATION: 100 %

## 2021-12-18 DIAGNOSIS — N18.9 CHRONIC KIDNEY DISEASE, UNSPECIFIED CKD STAGE: ICD-10-CM

## 2021-12-18 DIAGNOSIS — B34.2 CORONAVIRUS INFECTION: Primary | ICD-10-CM

## 2021-12-18 DIAGNOSIS — K29.00 ACUTE GASTRITIS WITHOUT HEMORRHAGE, UNSPECIFIED GASTRITIS TYPE: ICD-10-CM

## 2021-12-18 LAB
ALBUMIN SERPL-MCNC: 3.6 G/DL (ref 3.5–5.2)
ALBUMIN/GLOB SERPL: 1.1 G/DL
ALP SERPL-CCNC: 70 U/L (ref 39–117)
ALT SERPL W P-5'-P-CCNC: 69 U/L (ref 1–33)
ANION GAP SERPL CALCULATED.3IONS-SCNC: 14.1 MMOL/L (ref 5–15)
AST SERPL-CCNC: 46 U/L (ref 1–32)
BACTERIA UR QL AUTO: ABNORMAL /HPF
BASOPHILS # BLD AUTO: 0.03 10*3/MM3 (ref 0–0.2)
BASOPHILS NFR BLD AUTO: 0.3 % (ref 0–1.5)
BILIRUB SERPL-MCNC: 0.6 MG/DL (ref 0–1.2)
BILIRUB UR QL STRIP: NEGATIVE
BUN SERPL-MCNC: 47 MG/DL (ref 8–23)
BUN/CREAT SERPL: 20.6 (ref 7–25)
CALCIUM SPEC-SCNC: 9.3 MG/DL (ref 8.6–10.5)
CHLORIDE SERPL-SCNC: 96 MMOL/L (ref 98–107)
CLARITY UR: CLEAR
CO2 SERPL-SCNC: 25.9 MMOL/L (ref 22–29)
COLOR UR: YELLOW
CREAT SERPL-MCNC: 2.28 MG/DL (ref 0.57–1)
DEPRECATED RDW RBC AUTO: 41.1 FL (ref 37–54)
EOSINOPHIL # BLD AUTO: 0.38 10*3/MM3 (ref 0–0.4)
EOSINOPHIL NFR BLD AUTO: 3.4 % (ref 0.3–6.2)
ERYTHROCYTE [DISTWIDTH] IN BLOOD BY AUTOMATED COUNT: 12.7 % (ref 12.3–15.4)
GFR SERPL CREATININE-BSD FRML MDRD: 21 ML/MIN/1.73
GLOBULIN UR ELPH-MCNC: 3.3 GM/DL
GLUCOSE SERPL-MCNC: 105 MG/DL (ref 65–99)
GLUCOSE UR STRIP-MCNC: NEGATIVE MG/DL
HCT VFR BLD AUTO: 38.3 % (ref 34–46.6)
HGB BLD-MCNC: 13.4 G/DL (ref 12–15.9)
HGB UR QL STRIP.AUTO: NEGATIVE
HOLD SPECIMEN: NORMAL
HOLD SPECIMEN: NORMAL
HYALINE CASTS UR QL AUTO: ABNORMAL /LPF
IMM GRANULOCYTES # BLD AUTO: 0.33 10*3/MM3 (ref 0–0.05)
IMM GRANULOCYTES NFR BLD AUTO: 2.9 % (ref 0–0.5)
KETONES UR QL STRIP: NEGATIVE
LEUKOCYTE ESTERASE UR QL STRIP.AUTO: ABNORMAL
LYMPHOCYTES # BLD AUTO: 3.43 10*3/MM3 (ref 0.7–3.1)
LYMPHOCYTES NFR BLD AUTO: 30.4 % (ref 19.6–45.3)
MAGNESIUM SERPL-MCNC: 2 MG/DL (ref 1.6–2.4)
MCH RBC QN AUTO: 30.9 PG (ref 26.6–33)
MCHC RBC AUTO-ENTMCNC: 35 G/DL (ref 31.5–35.7)
MCV RBC AUTO: 88.2 FL (ref 79–97)
MONOCYTES # BLD AUTO: 1.25 10*3/MM3 (ref 0.1–0.9)
MONOCYTES NFR BLD AUTO: 11.1 % (ref 5–12)
NEUTROPHILS NFR BLD AUTO: 5.86 10*3/MM3 (ref 1.7–7)
NEUTROPHILS NFR BLD AUTO: 51.9 % (ref 42.7–76)
NITRITE UR QL STRIP: NEGATIVE
NRBC BLD AUTO-RTO: 0 /100 WBC (ref 0–0.2)
PH UR STRIP.AUTO: 7 [PH] (ref 5–8)
PLATELET # BLD AUTO: 227 10*3/MM3 (ref 140–450)
PMV BLD AUTO: 10.6 FL (ref 6–12)
POTASSIUM SERPL-SCNC: 3.7 MMOL/L (ref 3.5–5.2)
PROT SERPL-MCNC: 6.9 G/DL (ref 6–8.5)
PROT UR QL STRIP: NEGATIVE
RBC # BLD AUTO: 4.34 10*6/MM3 (ref 3.77–5.28)
RBC # UR STRIP: ABNORMAL /HPF
REF LAB TEST METHOD: ABNORMAL
SODIUM SERPL-SCNC: 136 MMOL/L (ref 136–145)
SP GR UR STRIP: 1.01 (ref 1–1.03)
SQUAMOUS #/AREA URNS HPF: ABNORMAL /HPF
TROPONIN T SERPL-MCNC: <0.01 NG/ML (ref 0–0.03)
UROBILINOGEN UR QL STRIP: ABNORMAL
WBC # UR STRIP: ABNORMAL /HPF
WBC NRBC COR # BLD: 11.28 10*3/MM3 (ref 3.4–10.8)
WHOLE BLOOD HOLD SPECIMEN: NORMAL
WHOLE BLOOD HOLD SPECIMEN: NORMAL

## 2021-12-18 PROCEDURE — 80053 COMPREHEN METABOLIC PANEL: CPT | Performed by: EMERGENCY MEDICINE

## 2021-12-18 PROCEDURE — 85025 COMPLETE CBC W/AUTO DIFF WBC: CPT

## 2021-12-18 PROCEDURE — 71045 X-RAY EXAM CHEST 1 VIEW: CPT

## 2021-12-18 PROCEDURE — 81001 URINALYSIS AUTO W/SCOPE: CPT | Performed by: EMERGENCY MEDICINE

## 2021-12-18 PROCEDURE — 93005 ELECTROCARDIOGRAM TRACING: CPT | Performed by: EMERGENCY MEDICINE

## 2021-12-18 PROCEDURE — 84484 ASSAY OF TROPONIN QUANT: CPT | Performed by: EMERGENCY MEDICINE

## 2021-12-18 PROCEDURE — 93010 ELECTROCARDIOGRAM REPORT: CPT | Performed by: INTERNAL MEDICINE

## 2021-12-18 PROCEDURE — 99283 EMERGENCY DEPT VISIT LOW MDM: CPT

## 2021-12-18 PROCEDURE — 83735 ASSAY OF MAGNESIUM: CPT | Performed by: EMERGENCY MEDICINE

## 2021-12-18 RX ORDER — ESOMEPRAZOLE MAGNESIUM 40 MG/1
40 CAPSULE, DELAYED RELEASE ORAL
Qty: 20 CAPSULE | Refills: 0 | Status: SHIPPED | OUTPATIENT
Start: 2021-12-18 | End: 2022-02-21

## 2021-12-18 RX ORDER — SODIUM CHLORIDE 0.9 % (FLUSH) 0.9 %
10 SYRINGE (ML) INJECTION AS NEEDED
Status: DISCONTINUED | OUTPATIENT
Start: 2021-12-18 | End: 2021-12-19 | Stop reason: HOSPADM

## 2021-12-18 RX ORDER — AZITHROMYCIN 250 MG/1
TABLET, FILM COATED ORAL
Qty: 6 TABLET | Refills: 0 | Status: SHIPPED | OUTPATIENT
Start: 2021-12-18 | End: 2022-02-21

## 2021-12-18 RX ORDER — SUCRALFATE 1 G/1
1 TABLET ORAL 3 TIMES DAILY
Qty: 30 TABLET | Refills: 0 | Status: SHIPPED | OUTPATIENT
Start: 2021-12-18 | End: 2022-02-21

## 2021-12-18 RX ADMIN — SODIUM CHLORIDE 1000 ML: 9 INJECTION, SOLUTION INTRAVENOUS at 21:35

## 2021-12-19 NOTE — DISCHARGE INSTRUCTIONS
Drink plenty of fluids.  Do not take your Lasix for the next week.  Take medications as directed.  Return for worsening symptoms.  Follow-up with your nephrologist and primary care doctor in 2 days if no better.

## 2021-12-19 NOTE — ED PROVIDER NOTES
Time: 7:08 PM EST  Arrived by: private car  Chief Complaint: COVID positive and weakness  History provided by: Patient  History is limited by: Nothing  History of Present Illness:  Patient is a 66 y.o. year old female that presents to the emergency department with a recent diagnosis of Covid.  The patient states that she was diagnosed 9 days ago and in fact I saw her in the emergency department that time.  Since that time the patient states that she has progressively gotten weaker.  She does complain of some shortness of breath however her oxygen saturations have been adequate.  The patient has had no nausea vomiting or diarrhea.  Patient states that she has not been eating well and now has difficulty walking because of her weakness.  HPI    Similar Symptoms Previously:yes  Recently seen: In the emergency department    Patient Care Team  Primary Care Provider: Maria Elena Mullins APRN    Past Medical History:     Allergies   Allergen Reactions   • Contrast Dye Other (See Comments)     CHRONIC KIDNEY DISEASE STAGE IV   • Nsaids Other (See Comments)     Cannot take due to kidneys     Past Medical History:   Diagnosis Date   • Acid reflux    • Anemia    • Arthritis     OSTEO   • CKD (chronic kidney disease) stage 4, GFR 15-29 ml/min ([facility])    • DDD (degenerative disc disease), cervical    • DDD (degenerative disc disease), lumbosacral    • DDD (degenerative disc disease), thoracolumbar    • Hyperlipidemia    • Hypertension    • Sleep apnea     CPAP     Past Surgical History:   Procedure Laterality Date   • BACK SURGERY      BURNED NERVE TO RELIEVE PAIN   • COLONOSCOPY  2014   • ENDOSCOPY  2009   • GASTRIC SLEEVE LAPAROSCOPIC N/A 10/30/2019    Procedure: GASTRIC SLEEVE LAPAROSCOPIC With Hiatal Hernia Repair;  Surgeon: Dane Delgado Jr., MD;  Location: [facility] OR [facility] [place];  Service: Bariatric     Family History   Problem Relation Age of Onset   • Obesity Mother    • Hypertension Mother    • Obesity Sister    • Hypertension  Sister    • Stroke Sister    • Obesity Brother    • Hypertension Brother    • Heart attack Brother    • Obesity Maternal Grandmother    • Hypertension Maternal Grandmother    • Cancer Maternal Grandmother         BRAIN   • Cancer Maternal Grandfather    • Hypertension Paternal Grandmother    • Hypertension Brother    • Hypertension Sister    • Stroke Sister    • Malig Hyperthermia Neg Hx        Home Medications:  Prior to Admission medications    Medication Sig Start Date End Date Taking? Authorizing Provider   ondansetron ODT (ZOFRAN-ODT) 4 MG disintegrating tablet Place 1 tablet on the tongue Every 8 (Eight) Hours As Needed for Nausea or Vomiting. 12/9/21  Yes Eduardo Zazueta, DO   albuterol sulfate  (90 Base) MCG/ACT inhaler Inhale 2 puffs Every 4 (Four) Hours As Needed for Wheezing. 12/9/21   Eduardo Zazueta, DO   calcium carbonate (TUMS) 500 MG chewable tablet Chew 1 tablet Daily.    ProviderLeonidas MD   carvedilol (COREG) 3.125 MG tablet Take 1 tablet by mouth 2 (Two) Times a Day With Meals. 10/19/21   Maria Elena Mullins APRN   [employer] FIBER GUMMIES PO Take  by mouth.    ProviderLeonidas MD   cyclobenzaprine (FLEXERIL) 10 MG tablet Take 10 mg by mouth 3 (Three) Times a Day As Needed. 9/14/21   Leonidas Boyd MD   escitalopram (LEXAPRO) 20 MG tablet Take 1 tablet by mouth Daily. 10/19/21   Maria Elena Mullins APRN   ferrous sulfate 325 (65 FE) MG tablet Take 1 tablet by mouth Daily. 10/19/21   Maria Elena Mullins APRN   furosemide (LASIX) 20 MG tablet Take 1 tablet by mouth Daily. 10/19/21   Maria Elena Mullins APRN   HYDROcodone-acetaminophen (NORCO) 7.5-325 MG per tablet Take 1 tablet by mouth Every 6 (Six) Hours As Needed.    Leonidas Boyd MD   hydrOXYzine (ATARAX) 50 MG tablet Take 1 tablet by mouth 3 (Three) Times a Day As Needed for Itching. 10/19/21   Maria Elena Mullins APRN   hydrOXYzine pamoate (VISTARIL) 25 MG capsule Take 1 capsule by mouth 4 (Four) Times a Day As Needed for Itching.  "10/19/21   Maria Elena Mullins APRN   Inulin 2 g chewable tablet     ProviderLeonidas MD   Multiple Vitamins-Minerals (MULTIVITAMIN ADULTS PO) Take 1 tablet by mouth Daily.    Leonidas Boyd MD   Pediatric Multivitamins-Iron (Flintstones Complete) 18 MG chewable tablet chewable tablet [place] multi vitamin one oral one daily.   Active    Provider, MD Leonidas   predniSONE (DELTASONE) 20 MG tablet Take 3 p.o. daily for 5 days. 12/9/21   Eduardo Zazueta DO   simvastatin (ZOCOR) 20 MG tablet Take 1 tablet by mouth Every Night. 10/4/21   Maria Elena Mullins APRN   traZODone (DESYREL) 100 MG tablet  5/1/20   ProviderLeonidas MD        Social History:   Social History     Tobacco Use   • Smoking status: Never Smoker   • Smokeless tobacco: Never Used   Substance Use Topics   • Alcohol use: Yes     Comment: HOLIDAYS AND VACATION   • Drug use: No     Recent travel: no     Review of Systems:  Review of Systems   Constitutional: Positive for activity change, appetite change, chills, diaphoresis, fatigue and fever.   HENT: Positive for congestion and rhinorrhea.    Eyes: Negative.    Respiratory: Positive for cough. Negative for shortness of breath, wheezing and stridor.    Cardiovascular: Negative.    Gastrointestinal: Positive for nausea. Negative for abdominal distention, abdominal pain and vomiting.   Endocrine: Negative.    Genitourinary: Negative.    Musculoskeletal: Positive for arthralgias and myalgias. Negative for gait problem, joint swelling, neck pain and neck stiffness.   Skin: Negative.    Neurological: Positive for weakness and light-headedness. Negative for dizziness, tremors, seizures, syncope, facial asymmetry, speech difficulty, numbness and headaches.   Hematological: Negative.    Psychiatric/Behavioral: Negative.         Physical Exam:  /97   Pulse 81   Temp 99.1 °F (37.3 °C) (Oral)   Resp 21   Ht 152.4 cm (60\")   Wt 82 kg (180 lb 12.4 oz)   SpO2 100%   BMI 35.31 kg/m² "     Physical Exam  Vitals and nursing note reviewed.   Constitutional:       Appearance: She is well-developed and normal weight.   HENT:      Head: Normocephalic and atraumatic.      Mouth/Throat:      Mouth: Mucous membranes are moist.      Pharynx: Oropharynx is clear.   Eyes:      Extraocular Movements: Extraocular movements intact.      Pupils: Pupils are equal, round, and reactive to light.   Cardiovascular:      Rate and Rhythm: Normal rate and regular rhythm.   Pulmonary:      Effort: Pulmonary effort is normal.      Breath sounds: Normal breath sounds.   Chest:      Chest wall: No mass, deformity, tenderness, crepitus or edema. There is no dullness to percussion.   Abdominal:      General: Bowel sounds are normal.      Palpations: Abdomen is soft.   Musculoskeletal:         General: Normal range of motion.      Cervical back: Normal range of motion and neck supple.   Skin:     General: Skin is warm and dry.      Capillary Refill: Capillary refill takes less than 2 seconds.   Neurological:      General: No focal deficit present.      Mental Status: She is alert and oriented to person, place, and time.   Psychiatric:         Mood and Affect: Mood normal.         Behavior: Behavior normal.                Medications in the Emergency Department:  Medications   sodium chloride 0.9 % bolus 1,000 mL (0 mL Intravenous Stopped 12/18/21 2822)        Labs  Lab Results (last 24 hours)     ** No results found for the last 24 hours. **           Imaging:  No Radiology Exams Resulted Within Past 24 Hours    Procedures:  Procedures    Progress                            Medical Decision Making:  MDM  Number of Diagnoses or Management Options  Acute gastritis without hemorrhage, unspecified gastritis type  Chronic kidney disease, unspecified CKD stage  Coronavirus infection  Diagnosis management comments: The patient was given IV fluids in the emergency department after which she felt improved.  I informed patient that she  appears to have a gastritis in addition to Covid pneumonia.  She does have a slightly decreased GFR and I spoke with Dr. Baum regarding this.  He felt the patient should stop her Lasix for the next week and make sure she has plenty of fluids to drink in addition to taking the prescribed medications.  The patient's vital signs are stable the entire stay in the emergency department her pulse oximeter is currently 96%.  The patient will be discharged home and follow-up with her primary care doctor and her nephrologist.       Amount and/or Complexity of Data Reviewed  Clinical lab tests: reviewed  Tests in the radiology section of CPT®: reviewed  Tests in the medicine section of CPT®: reviewed  Decide to obtain previous medical records or to obtain history from someone other than the patient: yes  Review and summarize past medical records: yes  Discuss the patient with other providers: yes  Independent visualization of images, tracings, or specimens: yes    Patient Progress  Patient progress: improved       Final diagnoses:   Coronavirus infection   Chronic kidney disease, unspecified CKD stage   Acute gastritis without hemorrhage, unspecified gastritis type        Disposition:  ED Disposition     ED Disposition Condition Comment    Discharge Stable           This medical record created using voice recognition software and may contain unintended errors.           Eduardo Zazueta, DO  12/19/21 1951

## 2021-12-21 ENCOUNTER — OFFICE VISIT (OUTPATIENT)
Dept: INTERNAL MEDICINE | Facility: CLINIC | Age: 66
End: 2021-12-21

## 2021-12-21 VITALS
WEIGHT: 173.6 LBS | TEMPERATURE: 99.2 F | DIASTOLIC BLOOD PRESSURE: 80 MMHG | OXYGEN SATURATION: 95 % | SYSTOLIC BLOOD PRESSURE: 136 MMHG | BODY MASS INDEX: 34.08 KG/M2 | HEIGHT: 60 IN | HEART RATE: 79 BPM

## 2021-12-21 DIAGNOSIS — J12.82 PNEUMONIA DUE TO COVID-19 VIRUS: Primary | ICD-10-CM

## 2021-12-21 DIAGNOSIS — U07.1 PNEUMONIA DUE TO COVID-19 VIRUS: Primary | ICD-10-CM

## 2021-12-21 PROCEDURE — 99213 OFFICE O/P EST LOW 20 MIN: CPT | Performed by: NURSE PRACTITIONER

## 2021-12-21 RX ORDER — DOXYCYCLINE HYCLATE 100 MG/1
100 CAPSULE ORAL 2 TIMES DAILY
Qty: 20 CAPSULE | Refills: 0 | Status: SHIPPED | OUTPATIENT
Start: 2021-12-21 | End: 2021-12-31

## 2021-12-21 RX ORDER — DEXTROMETHORPHAN HYDROBROMIDE AND PROMETHAZINE HYDROCHLORIDE 15; 6.25 MG/5ML; MG/5ML
2.5 SYRUP ORAL 4 TIMES DAILY PRN
Qty: 50 ML | Refills: 0 | Status: SHIPPED | OUTPATIENT
Start: 2021-12-21 | End: 2022-02-21

## 2021-12-21 RX ORDER — ALBUTEROL SULFATE 2.5 MG/3ML
2.5 SOLUTION RESPIRATORY (INHALATION) EVERY 4 HOURS PRN
Qty: 30 EACH | Refills: 1 | Status: SHIPPED | OUTPATIENT
Start: 2021-12-21 | End: 2022-12-20

## 2021-12-21 NOTE — ASSESSMENT & PLAN NOTE
Patient ill-appearing, however vital signs stable.  O2 sat 95%.  Reviewed imaging in clinic with patient, will treat with doxycycline and albuterol nebulizer solution at this time.  Discussed with patient that she should treat with nebulizer in a room without other people to prevent spread of COVID-19.  Increase fluids, monitor output.  She will proceed directly to the ED with severe/persistent cough, shortness of breath, fever, chills.  Low threshold for ED visit in this patient, she is outside window for monoclonal antibodies.  Continue quarantine, conservative care.  Will follow up in 1 week, sooner if concerns arise.

## 2021-12-21 NOTE — PROGRESS NOTES
"Chief Complaint  Cough (dx pneumonia, hx covid) and Shortness of Breath    Subjective          Ro Adorno presents to [facility]ATRICS  Patient diagnosed with COVID19 12 days ago, was sent to the ED from clinic due to profuse vomiting and dizziness.  Patient reports she was treated at that time with breathing treatments and IV fluids and sent home.  Patient states symptoms continue to worsen, was taken to the ED by EMS 2 days ago and diagnosed with Covid pneumonia.  Patient reports she was treated with azithromycin and Carafate and Nexium for gastritis.  Patient was also given more IV fluids and discharged home.  Patient states since that time she is not feeling any better, she continues to have shortness of breath, cough, weakness, decreased appetite.  She is no longer vomiting.  Denies fever, loss of taste/smell.  Decreased appetite, drinking well.  Patient reports she was initially treated with prednisone but is unsure if this was helpful.  She also has an albuterol inhaler at home but also does not remember if this was helpful.  Patient was unable to receive monoclonal antibodies because they were not available at [facility].  Reviewed imaging from the ED in clinic with patient.      Objective   Vital Signs:   /80   Pulse 79   Temp 99.2 °F (37.3 °C)   Ht 152.4 cm (60\")   Wt 78.7 kg (173 lb 9.6 oz)   SpO2 95%   BMI 33.90 kg/m²     Physical Exam  Constitutional:       Appearance: Normal appearance. She is normal weight. She is ill-appearing.   HENT:      Head: Normocephalic and atraumatic.      Nose: Nose normal.      Mouth/Throat:      Mouth: Mucous membranes are moist.      Pharynx: Oropharynx is clear.   Eyes:      Extraocular Movements: Extraocular movements intact.      Conjunctiva/sclera: Conjunctivae normal.      Pupils: Pupils are equal, round, and reactive to light.   Cardiovascular:      Rate and Rhythm: Normal rate and regular rhythm.      " Heart sounds: Normal heart sounds.   Pulmonary:      Effort: Pulmonary effort is normal.      Breath sounds: Normal breath sounds.   Skin:     General: Skin is warm and dry.   Neurological:      General: No focal deficit present.      Mental Status: She is alert and oriented to person, place, and time.   Psychiatric:         Mood and Affect: Mood normal.         Behavior: Behavior normal.         Thought Content: Thought content normal.        Result Review :                 Assessment and Plan    Diagnoses and all orders for this visit:    1. Pneumonia due to COVID-19 virus (Primary)  Assessment & Plan:  Patient ill-appearing, however vital signs stable.  O2 sat 95%.  Reviewed imaging in clinic with patient, will treat with doxycycline and albuterol nebulizer solution at this time.  Discussed with patient that she should treat with nebulizer in a room without other people to prevent spread of COVID-19.  Increase fluids, monitor output.  She will proceed directly to the ED with severe/persistent cough, shortness of breath, fever, chills.  Low threshold for ED visit in this patient, she is outside window for monoclonal antibodies.  Continue quarantine, conservative care.  Will follow up in 1 week, sooner if concerns arise.      Other orders  -     doxycycline (VIBRAMYCIN) 100 MG capsule; Take 1 capsule by mouth 2 (Two) Times a Day for 10 days.  Dispense: 20 capsule; Refill: 0  -     albuterol (PROVENTIL) (2.5 MG/3ML) 0.083% nebulizer solution; Take 2.5 mg by nebulization Every 4 (Four) Hours As Needed for Wheezing.  Dispense: 30 each; Refill: 1  -     promethazine-dextromethorphan (PROMETHAZINE-DM) 6.25-15 MG/5ML syrup; Take 2.5 mL by mouth 4 (Four) Times a Day As Needed for Cough.  Dispense: 50 mL; Refill: 0      Follow Up   Return in about 1 week (around 12/28/2021).  Patient was given instructions and counseling regarding her condition or for health maintenance advice. Please see specific information pulled into  the AVS if appropriate.

## 2021-12-27 LAB — QT INTERVAL: 409 MS

## 2021-12-28 ENCOUNTER — OFFICE VISIT (OUTPATIENT)
Dept: INTERNAL MEDICINE | Facility: CLINIC | Age: 66
End: 2021-12-28

## 2021-12-28 VITALS
HEART RATE: 78 BPM | BODY MASS INDEX: 33.96 KG/M2 | RESPIRATION RATE: 18 BRPM | TEMPERATURE: 97.6 F | WEIGHT: 173 LBS | OXYGEN SATURATION: 96 % | SYSTOLIC BLOOD PRESSURE: 122 MMHG | HEIGHT: 60 IN | DIASTOLIC BLOOD PRESSURE: 74 MMHG

## 2021-12-28 DIAGNOSIS — N18.4 STAGE 4 CHRONIC KIDNEY DISEASE (HCC): ICD-10-CM

## 2021-12-28 DIAGNOSIS — U07.1 PNEUMONIA DUE TO COVID-19 VIRUS: Primary | ICD-10-CM

## 2021-12-28 DIAGNOSIS — J12.82 PNEUMONIA DUE TO COVID-19 VIRUS: Primary | ICD-10-CM

## 2021-12-28 LAB
ALBUMIN SERPL-MCNC: 3.4 G/DL (ref 3.5–5.2)
ALBUMIN/GLOB SERPL: 1.1 G/DL
ALP SERPL-CCNC: 70 U/L (ref 39–117)
ALT SERPL W P-5'-P-CCNC: 36 U/L (ref 1–33)
ANION GAP SERPL CALCULATED.3IONS-SCNC: 7.5 MMOL/L (ref 5–15)
AST SERPL-CCNC: 38 U/L (ref 1–32)
BASOPHILS # BLD AUTO: 0.1 10*3/MM3 (ref 0–0.2)
BASOPHILS NFR BLD AUTO: 1 % (ref 0–1.5)
BILIRUB SERPL-MCNC: 0.2 MG/DL (ref 0–1.2)
BUN SERPL-MCNC: 20 MG/DL (ref 8–23)
BUN/CREAT SERPL: 9.9 (ref 7–25)
CALCIUM SPEC-SCNC: 9.2 MG/DL (ref 8.6–10.5)
CHLORIDE SERPL-SCNC: 104 MMOL/L (ref 98–107)
CO2 SERPL-SCNC: 24.5 MMOL/L (ref 22–29)
CREAT SERPL-MCNC: 2.03 MG/DL (ref 0.57–1)
DEPRECATED RDW RBC AUTO: 42.2 FL (ref 37–54)
EOSINOPHIL # BLD AUTO: 0.45 10*3/MM3 (ref 0–0.4)
EOSINOPHIL NFR BLD AUTO: 4.7 % (ref 0.3–6.2)
ERYTHROCYTE [DISTWIDTH] IN BLOOD BY AUTOMATED COUNT: 12.1 % (ref 12.3–15.4)
GFR SERPL CREATININE-BSD FRML MDRD: 25 ML/MIN/1.73
GLOBULIN UR ELPH-MCNC: 3.2 GM/DL
GLUCOSE SERPL-MCNC: 86 MG/DL (ref 65–99)
HCT VFR BLD AUTO: 32.8 % (ref 34–46.6)
HGB BLD-MCNC: 10.4 G/DL (ref 12–15.9)
IMM GRANULOCYTES # BLD AUTO: 0.04 10*3/MM3 (ref 0–0.05)
IMM GRANULOCYTES NFR BLD AUTO: 0.4 % (ref 0–0.5)
LYMPHOCYTES # BLD AUTO: 2.34 10*3/MM3 (ref 0.7–3.1)
LYMPHOCYTES NFR BLD AUTO: 24.5 % (ref 19.6–45.3)
MCH RBC QN AUTO: 30 PG (ref 26.6–33)
MCHC RBC AUTO-ENTMCNC: 31.7 G/DL (ref 31.5–35.7)
MCV RBC AUTO: 94.5 FL (ref 79–97)
MONOCYTES # BLD AUTO: 0.68 10*3/MM3 (ref 0.1–0.9)
MONOCYTES NFR BLD AUTO: 7.1 % (ref 5–12)
NEUTROPHILS NFR BLD AUTO: 5.93 10*3/MM3 (ref 1.7–7)
NEUTROPHILS NFR BLD AUTO: 62.3 % (ref 42.7–76)
NRBC BLD AUTO-RTO: 0.1 /100 WBC (ref 0–0.2)
PLATELET # BLD AUTO: 280 10*3/MM3 (ref 140–450)
PMV BLD AUTO: 11.1 FL (ref 6–12)
POTASSIUM SERPL-SCNC: 4.3 MMOL/L (ref 3.5–5.2)
PROT SERPL-MCNC: 6.6 G/DL (ref 6–8.5)
RBC # BLD AUTO: 3.47 10*6/MM3 (ref 3.77–5.28)
SODIUM SERPL-SCNC: 136 MMOL/L (ref 136–145)
WBC NRBC COR # BLD: 9.54 10*3/MM3 (ref 3.4–10.8)

## 2021-12-28 PROCEDURE — 99213 OFFICE O/P EST LOW 20 MIN: CPT | Performed by: NURSE PRACTITIONER

## 2021-12-28 PROCEDURE — 80053 COMPREHEN METABOLIC PANEL: CPT | Performed by: NURSE PRACTITIONER

## 2021-12-28 PROCEDURE — 85025 COMPLETE CBC W/AUTO DIFF WBC: CPT | Performed by: NURSE PRACTITIONER

## 2021-12-28 NOTE — PROGRESS NOTES
"Chief Complaint  Pneumonia (Feeling Better Slowly) and Follow-up    Subjective          Ro Adorno presents to [facility]  Patient in clinic for one week follow up for COVID pneumonia. Was treated with doxycycline and albuterol nebs at previous visit. Patient states she continues to have intermittent shortness of breath with walking longer distances but is tolerating daily activities well. Denies fever. Will have intermittent productive cough after albuterol nebulizer treatment, she is doing these 3 times daily.  Patient reports overall she is feeling much better and has no concerns at this time.  She would like to repeat CMP to evaluate GFR, was decreased to 21 with previous labs.      Objective   Vital Signs:   /74 (BP Location: Right arm, Patient Position: Sitting, Cuff Size: Adult)   Pulse 78   Temp 97.6 °F (36.4 °C) (Temporal)   Resp 18   Ht 152.4 cm (60\")   Wt 78.5 kg (173 lb)   SpO2 96%   BMI 33.79 kg/m²     Physical Exam  Constitutional:       Appearance: Normal appearance. She is normal weight.   HENT:      Head: Normocephalic and atraumatic.      Nose: Nose normal.      Mouth/Throat:      Mouth: Mucous membranes are moist.      Pharynx: Oropharynx is clear.   Eyes:      Extraocular Movements: Extraocular movements intact.      Conjunctiva/sclera: Conjunctivae normal.      Pupils: Pupils are equal, round, and reactive to light.   Cardiovascular:      Rate and Rhythm: Normal rate and regular rhythm.      Heart sounds: Normal heart sounds.   Pulmonary:      Effort: Pulmonary effort is normal.      Breath sounds: Normal breath sounds.   Skin:     General: Skin is warm and dry.   Neurological:      General: No focal deficit present.      Mental Status: She is alert and oriented to person, place, and time.   Psychiatric:         Mood and Affect: Mood normal.         Behavior: Behavior normal.         Thought Content: Thought content " normal.        Result Review :                 Assessment and Plan {CC Problem List  Visit Diagnosis   ROS  Review (Popup)  Health Maintenance  Quality  BestPractice  Medications  SmartSets  SnapShot Encounters  Media :23}   Diagnoses and all orders for this visit:    1. Pneumonia due to COVID-19 virus (Primary)  Assessment & Plan:  Exam reassuring, lung sounds clear, O2 sat 96%.  Patient overall feeling well since previous visit, will continue albuterol nebs as needed and complete course of antibiotics.  Labs in clinic today per patient request to evaluate GFR and WBC.  She is aware that WBC will likely still be elevated.  Patient to seek medical attention immediately with severe/persistent shortness of breath, cough, fever.  Will follow up as scheduled, sooner if concerns arise.    Orders:  -     CBC & Differential  -     Comprehensive Metabolic Panel    2. Stage 4 chronic kidney disease (HCC)  -     CBC & Differential  -     Comprehensive Metabolic Panel      Follow Up   Return for Next scheduled follow up.  Patient was given instructions and counseling regarding her condition or for health maintenance advice. Please see specific information pulled into the AVS if appropriate.

## 2021-12-28 NOTE — ASSESSMENT & PLAN NOTE
Exam reassuring, lung sounds clear, O2 sat 96%.  Patient overall feeling well since previous visit, will continue albuterol nebs as needed and complete course of antibiotics.  Labs in clinic today per patient request to evaluate GFR and WBC.  She is aware that WBC will likely still be elevated.  Patient to seek medical attention immediately with severe/persistent shortness of breath, cough, fever.  Will follow up as scheduled, sooner if concerns arise.

## 2022-01-03 DIAGNOSIS — E61.1 IRON DEFICIENCY: Primary | ICD-10-CM

## 2022-01-04 ENCOUNTER — TELEPHONE (OUTPATIENT)
Dept: INTERNAL MEDICINE | Facility: CLINIC | Age: 67
End: 2022-01-04

## 2022-01-04 RX ORDER — FUROSEMIDE 20 MG/1
20 TABLET ORAL DAILY
Qty: 90 TABLET | Refills: 0 | Status: CANCELLED | OUTPATIENT
Start: 2022-01-04

## 2022-01-04 NOTE — TELEPHONE ENCOUNTER
Caller: Ro Adorno    Relationship: Self    Best call back number: [phone]     Requested Prescriptions:   Requested Prescriptions     Pending Prescriptions Disp Refills   • furosemide (LASIX) 20 MG tablet 90 tablet 0     Sig: Take 1 tablet by mouth Daily.        Pharmacy where request should be sent: [facility] [address], [place] - [address] AT [facility] [address] - [phone] [facility] [phone] [facility]       Does the patient have less than a 3 day supply:  [x] Yes  [] No    Faby Nuñez Rep   01/04/22 10:57 EST

## 2022-01-05 ENCOUNTER — HOSPITAL ENCOUNTER (OUTPATIENT)
Dept: MAMMOGRAPHY | Facility: HOSPITAL | Age: 67
Discharge: HOME | End: 2022-01-05
Admitting: NURSE PRACTITIONER

## 2022-01-05 DIAGNOSIS — N28.9 FUNCTION KIDNEY DECREASED: Primary | ICD-10-CM

## 2022-01-05 DIAGNOSIS — Z12.31 VISIT FOR SCREENING MAMMOGRAM: ICD-10-CM

## 2022-01-05 PROCEDURE — 77067 SCR MAMMO BI INCL CAD: CPT

## 2022-01-05 PROCEDURE — 77063 BREAST TOMOSYNTHESIS BI: CPT

## 2022-01-05 RX ORDER — FUROSEMIDE 20 MG/1
20 TABLET ORAL DAILY
Qty: 90 TABLET | Refills: 0 | Status: SHIPPED | OUTPATIENT
Start: 2022-01-05 | End: 2022-06-01

## 2022-01-05 NOTE — TELEPHONE ENCOUNTER
Left detailed message on patient mobile phone.  Restart Iron supplement and come in to get CBC and CMP lab work in 1 week per Maria Elena. Rx sent to pharmacy per patient request.     Per conversation with provider; okay to send lasix at this time and have patient repeat lab work cbc and cmp in 1 week.

## 2022-01-12 ENCOUNTER — CLINICAL SUPPORT (OUTPATIENT)
Dept: INTERNAL MEDICINE | Facility: CLINIC | Age: 67
End: 2022-01-12

## 2022-01-12 DIAGNOSIS — N28.9 FUNCTION KIDNEY DECREASED: ICD-10-CM

## 2022-01-12 DIAGNOSIS — E61.1 IRON DEFICIENCY: ICD-10-CM

## 2022-01-12 LAB
ALBUMIN SERPL-MCNC: 3.9 G/DL (ref 3.5–5.2)
ALBUMIN/GLOB SERPL: 1.2 G/DL
ALP SERPL-CCNC: 75 U/L (ref 39–117)
ALT SERPL W P-5'-P-CCNC: 41 U/L (ref 1–33)
ANION GAP SERPL CALCULATED.3IONS-SCNC: 5.9 MMOL/L (ref 5–15)
AST SERPL-CCNC: 48 U/L (ref 1–32)
BASOPHILS # BLD AUTO: 0.08 10*3/MM3 (ref 0–0.2)
BASOPHILS NFR BLD AUTO: 0.8 % (ref 0–1.5)
BILIRUB SERPL-MCNC: 0.2 MG/DL (ref 0–1.2)
BUN SERPL-MCNC: 32 MG/DL (ref 8–23)
BUN/CREAT SERPL: 15.1 (ref 7–25)
CALCIUM SPEC-SCNC: 9.6 MG/DL (ref 8.6–10.5)
CHLORIDE SERPL-SCNC: 103 MMOL/L (ref 98–107)
CO2 SERPL-SCNC: 28.1 MMOL/L (ref 22–29)
CREAT SERPL-MCNC: 2.12 MG/DL (ref 0.57–1)
DEPRECATED RDW RBC AUTO: 40.6 FL (ref 37–54)
EOSINOPHIL # BLD AUTO: 0.4 10*3/MM3 (ref 0–0.4)
EOSINOPHIL NFR BLD AUTO: 3.8 % (ref 0.3–6.2)
ERYTHROCYTE [DISTWIDTH] IN BLOOD BY AUTOMATED COUNT: 12.6 % (ref 12.3–15.4)
GFR SERPL CREATININE-BSD FRML MDRD: 23 ML/MIN/1.73
GLOBULIN UR ELPH-MCNC: 3.3 GM/DL
GLUCOSE SERPL-MCNC: 85 MG/DL (ref 65–99)
HCT VFR BLD AUTO: 35.1 % (ref 34–46.6)
HGB BLD-MCNC: 11.5 G/DL (ref 12–15.9)
IMM GRANULOCYTES # BLD AUTO: 0.09 10*3/MM3 (ref 0–0.05)
IMM GRANULOCYTES NFR BLD AUTO: 0.8 % (ref 0–0.5)
LYMPHOCYTES # BLD AUTO: 3.89 10*3/MM3 (ref 0.7–3.1)
LYMPHOCYTES NFR BLD AUTO: 36.5 % (ref 19.6–45.3)
MCH RBC QN AUTO: 29.9 PG (ref 26.6–33)
MCHC RBC AUTO-ENTMCNC: 32.8 G/DL (ref 31.5–35.7)
MCV RBC AUTO: 91.2 FL (ref 79–97)
MONOCYTES # BLD AUTO: 1.13 10*3/MM3 (ref 0.1–0.9)
MONOCYTES NFR BLD AUTO: 10.6 % (ref 5–12)
NEUTROPHILS NFR BLD AUTO: 47.5 % (ref 42.7–76)
NEUTROPHILS NFR BLD AUTO: 5.07 10*3/MM3 (ref 1.7–7)
NRBC BLD AUTO-RTO: 0 /100 WBC (ref 0–0.2)
PLATELET # BLD AUTO: 282 10*3/MM3 (ref 140–450)
PMV BLD AUTO: 11.2 FL (ref 6–12)
POTASSIUM SERPL-SCNC: 5 MMOL/L (ref 3.5–5.2)
PROT SERPL-MCNC: 7.2 G/DL (ref 6–8.5)
RBC # BLD AUTO: 3.85 10*6/MM3 (ref 3.77–5.28)
SODIUM SERPL-SCNC: 137 MMOL/L (ref 136–145)
WBC NRBC COR # BLD: 10.66 10*3/MM3 (ref 3.4–10.8)

## 2022-01-12 PROCEDURE — 80053 COMPREHEN METABOLIC PANEL: CPT | Performed by: NURSE PRACTITIONER

## 2022-01-12 PROCEDURE — 36415 COLL VENOUS BLD VENIPUNCTURE: CPT | Performed by: NURSE PRACTITIONER

## 2022-01-12 PROCEDURE — 85025 COMPLETE CBC W/AUTO DIFF WBC: CPT | Performed by: NURSE PRACTITIONER

## 2022-01-13 RX ORDER — SIMVASTATIN 20 MG
20 TABLET ORAL NIGHTLY
Qty: 90 TABLET | Refills: 1 | Status: SHIPPED | OUTPATIENT
Start: 2022-01-13 | End: 2022-02-21 | Stop reason: SDUPTHER

## 2022-01-13 NOTE — TELEPHONE ENCOUNTER
Caller: Ro Adorno    Relationship: Self    Best call back number: [phone]    Requested Prescriptions:   Requested Prescriptions     Pending Prescriptions Disp Refills   • simvastatin (ZOCOR) 20 MG tablet 90 tablet 1     Sig: Take 1 tablet by mouth Every Night.        Pharmacy where request should be sent: [facility] [address], [place] - [address] AT [facility] [address] - [phone] [facility] [phone] [place]       Does the patient have less than a 3 day supply:  [] Yes  [x] No    Faby Balderas Rep   01/13/22 13:04 EST

## 2022-02-21 ENCOUNTER — OFFICE VISIT (OUTPATIENT)
Dept: INTERNAL MEDICINE | Facility: CLINIC | Age: 67
End: 2022-02-21

## 2022-02-21 VITALS
DIASTOLIC BLOOD PRESSURE: 84 MMHG | HEIGHT: 60 IN | SYSTOLIC BLOOD PRESSURE: 124 MMHG | WEIGHT: 179 LBS | TEMPERATURE: 97.9 F | OXYGEN SATURATION: 97 % | HEART RATE: 75 BPM | BODY MASS INDEX: 35.14 KG/M2

## 2022-02-21 DIAGNOSIS — G47.00 INSOMNIA, UNSPECIFIED TYPE: ICD-10-CM

## 2022-02-21 DIAGNOSIS — G47.33 OSA (OBSTRUCTIVE SLEEP APNEA): ICD-10-CM

## 2022-02-21 DIAGNOSIS — E61.1 IRON DEFICIENCY: Primary | ICD-10-CM

## 2022-02-21 DIAGNOSIS — E55.9 VITAMIN D DEFICIENCY: ICD-10-CM

## 2022-02-21 DIAGNOSIS — R05.9 COUGH: ICD-10-CM

## 2022-02-21 DIAGNOSIS — I10 ESSENTIAL HYPERTENSION: ICD-10-CM

## 2022-02-21 DIAGNOSIS — K21.9 GASTROESOPHAGEAL REFLUX DISEASE WITHOUT ESOPHAGITIS: ICD-10-CM

## 2022-02-21 DIAGNOSIS — F33.0 MAJOR DEPRESSIVE DISORDER, RECURRENT, MILD: ICD-10-CM

## 2022-02-21 DIAGNOSIS — N18.4 STAGE 4 CHRONIC KIDNEY DISEASE: ICD-10-CM

## 2022-02-21 DIAGNOSIS — E78.5 HYPERLIPIDEMIA, UNSPECIFIED HYPERLIPIDEMIA TYPE: ICD-10-CM

## 2022-02-21 DIAGNOSIS — Z86.16 HISTORY OF COVID-19: ICD-10-CM

## 2022-02-21 DIAGNOSIS — E05.90 HYPERTHYROIDISM: ICD-10-CM

## 2022-02-21 DIAGNOSIS — F41.9 ANXIETY: ICD-10-CM

## 2022-02-21 DIAGNOSIS — M25.50 MULTIPLE JOINT PAIN: ICD-10-CM

## 2022-02-21 DIAGNOSIS — Z13.9 SCREENING FOR CONDITION: ICD-10-CM

## 2022-02-21 DIAGNOSIS — Z23 NEED FOR PNEUMOCOCCAL VACCINE: ICD-10-CM

## 2022-02-21 PROBLEM — U07.1 PNEUMONIA DUE TO COVID-19 VIRUS: Status: RESOLVED | Noted: 2021-12-09 | Resolved: 2022-02-21

## 2022-02-21 PROBLEM — J12.82 PNEUMONIA DUE TO COVID-19 VIRUS: Status: RESOLVED | Noted: 2021-12-09 | Resolved: 2022-02-21

## 2022-02-21 LAB
BASOPHILS # BLD AUTO: 0.08 10*3/MM3 (ref 0–0.2)
BASOPHILS NFR BLD AUTO: 0.8 % (ref 0–1.5)
DEPRECATED RDW RBC AUTO: 45.8 FL (ref 37–54)
EOSINOPHIL # BLD AUTO: 0.38 10*3/MM3 (ref 0–0.4)
EOSINOPHIL NFR BLD AUTO: 3.9 % (ref 0.3–6.2)
ERYTHROCYTE [DISTWIDTH] IN BLOOD BY AUTOMATED COUNT: 13.3 % (ref 12.3–15.4)
HCT VFR BLD AUTO: 37.9 % (ref 34–46.6)
HGB BLD-MCNC: 12 G/DL (ref 12–15.9)
IMM GRANULOCYTES # BLD AUTO: 0.03 10*3/MM3 (ref 0–0.05)
IMM GRANULOCYTES NFR BLD AUTO: 0.3 % (ref 0–0.5)
LYMPHOCYTES # BLD AUTO: 2.66 10*3/MM3 (ref 0.7–3.1)
LYMPHOCYTES NFR BLD AUTO: 27.6 % (ref 19.6–45.3)
MCH RBC QN AUTO: 29.9 PG (ref 26.6–33)
MCHC RBC AUTO-ENTMCNC: 31.7 G/DL (ref 31.5–35.7)
MCV RBC AUTO: 94.3 FL (ref 79–97)
MONOCYTES # BLD AUTO: 0.72 10*3/MM3 (ref 0.1–0.9)
MONOCYTES NFR BLD AUTO: 7.5 % (ref 5–12)
NEUTROPHILS NFR BLD AUTO: 5.78 10*3/MM3 (ref 1.7–7)
NEUTROPHILS NFR BLD AUTO: 59.9 % (ref 42.7–76)
NRBC BLD AUTO-RTO: 0 /100 WBC (ref 0–0.2)
PLATELET # BLD AUTO: 213 10*3/MM3 (ref 140–450)
PMV BLD AUTO: 11.7 FL (ref 6–12)
RBC # BLD AUTO: 4.02 10*6/MM3 (ref 3.77–5.28)
WBC NRBC COR # BLD: 9.65 10*3/MM3 (ref 3.4–10.8)

## 2022-02-21 PROCEDURE — 86803 HEPATITIS C AB TEST: CPT | Performed by: NURSE PRACTITIONER

## 2022-02-21 PROCEDURE — G0009 ADMIN PNEUMOCOCCAL VACCINE: HCPCS | Performed by: NURSE PRACTITIONER

## 2022-02-21 PROCEDURE — 80053 COMPREHEN METABOLIC PANEL: CPT | Performed by: NURSE PRACTITIONER

## 2022-02-21 PROCEDURE — 82306 VITAMIN D 25 HYDROXY: CPT | Performed by: NURSE PRACTITIONER

## 2022-02-21 PROCEDURE — 84443 ASSAY THYROID STIM HORMONE: CPT | Performed by: NURSE PRACTITIONER

## 2022-02-21 PROCEDURE — 85025 COMPLETE CBC W/AUTO DIFF WBC: CPT | Performed by: NURSE PRACTITIONER

## 2022-02-21 PROCEDURE — 82728 ASSAY OF FERRITIN: CPT | Performed by: NURSE PRACTITIONER

## 2022-02-21 PROCEDURE — 36415 COLL VENOUS BLD VENIPUNCTURE: CPT | Performed by: NURSE PRACTITIONER

## 2022-02-21 PROCEDURE — 80061 LIPID PANEL: CPT | Performed by: NURSE PRACTITIONER

## 2022-02-21 PROCEDURE — 84466 ASSAY OF TRANSFERRIN: CPT | Performed by: NURSE PRACTITIONER

## 2022-02-21 PROCEDURE — 84439 ASSAY OF FREE THYROXINE: CPT | Performed by: NURSE PRACTITIONER

## 2022-02-21 PROCEDURE — 83540 ASSAY OF IRON: CPT | Performed by: NURSE PRACTITIONER

## 2022-02-21 PROCEDURE — 99214 OFFICE O/P EST MOD 30 MIN: CPT | Performed by: NURSE PRACTITIONER

## 2022-02-21 PROCEDURE — 90732 PPSV23 VACC 2 YRS+ SUBQ/IM: CPT | Performed by: NURSE PRACTITIONER

## 2022-02-21 RX ORDER — SIMVASTATIN 20 MG
20 TABLET ORAL NIGHTLY
Qty: 90 TABLET | Refills: 1 | Status: SHIPPED | OUTPATIENT
Start: 2022-02-21 | End: 2022-02-22

## 2022-02-21 RX ORDER — FERROUS SULFATE 325(65) MG
1 TABLET ORAL DAILY
Qty: 90 TABLET | Refills: 1 | Status: SHIPPED | OUTPATIENT
Start: 2022-02-21 | End: 2022-09-29

## 2022-02-21 RX ORDER — HYDROXYZINE 50 MG/1
50 TABLET, FILM COATED ORAL 3 TIMES DAILY PRN
Qty: 270 TABLET | Refills: 1 | Status: SHIPPED | OUTPATIENT
Start: 2022-02-21 | End: 2022-09-29

## 2022-02-21 RX ORDER — CARVEDILOL 3.12 MG/1
3.12 TABLET ORAL 2 TIMES DAILY WITH MEALS
Qty: 180 TABLET | Refills: 0 | Status: SHIPPED | OUTPATIENT
Start: 2022-02-21 | End: 2022-06-01

## 2022-02-21 RX ORDER — TRAZODONE HYDROCHLORIDE 100 MG/1
100 TABLET ORAL NIGHTLY
Qty: 90 TABLET | Refills: 1 | Status: SHIPPED | OUTPATIENT
Start: 2022-02-21

## 2022-02-21 RX ORDER — ESCITALOPRAM OXALATE 20 MG/1
20 TABLET ORAL DAILY
Qty: 90 TABLET | Refills: 1 | Status: SHIPPED | OUTPATIENT
Start: 2022-02-21 | End: 2022-08-29

## 2022-02-21 NOTE — ASSESSMENT & PLAN NOTE
Well controlled, continue Coreg.  Patient to restart Lasix if blood pressure readings increase to greater than 130/80 or lower extremity edema develops.  Will continue to monitor.

## 2022-02-21 NOTE — ASSESSMENT & PLAN NOTE
Most recent GFR 25.  CMP in clinic today.  Patient to schedule follow-up with nephrology, will continue to monitor.

## 2022-02-21 NOTE — PROGRESS NOTES
"Chief Complaint  Pneumonia (f/u covid pneumonia. \"I still get winded easily, and in the morning I have a little cough it sounds like a smoker's cough.\")    Subjective          Ro Adorno presents to [facility]  CKD-  Most recent GFR 25. Due for repeat labs, she still has not had an appointment with nephrology but plans to schedule soon to discuss Farxiga. Patient continues to take hydroxyzine for itching. GFR has remained stable since bariatric surgery and associated weight loss. Patient has gained some weight since having COVID because she hasn't been able to tolerate exercise, reports shortness of breath with activity. Patient reports she also wakes up every morning with a cough, this does not seem to be improving. She has not had repeat imaging.      HLD-  Managed with statin.  Well-tolerated, denies leg cramping.  Most recent LDL 70.     GERD-  Well-controlled with pantoprazole.     Anxiety/depression/insomnia-  Well controlled with Lexapro.  Denies SI/HI.  Patient restarted trazodone PRN, often 2 times a week. She will also take melatonin and Tylenol PM as needed.      Hyperthyroid-  Previously managed with methimazole, patient has been off of medication for greater than 2 years.  Thyroid ultrasound 1/2021, stable, repeat 2023.     SEVEN-  Patient previously managed with CPAP, patient has still not received updated equipment since recall.      Iron deficiency-  Patient continues to take oral iron supplement once daily.     HTN-  Managed with Coreg, patient stopped Lasix two weeks ago due to hypotensive episodes of 90/60.  Patient reports home readings 120s/80s.  Denies chest pain, blurry vision, headache, leg swelling.     PAP: 2019, normal   Mammogram: 1/2022; Denies family history of breast cancer  DEXA: 11/2020  Hepatitis C screening: Due   Colonoscopy: 2019; Denies family history of colon cancer  Influenza vaccination: Up to date  COVID19 " "vaccination: Up to date  Pneumonia vaccination: Up to date      Objective   Vital Signs:   /84   Pulse 75   Temp 97.9 °F (36.6 °C)   Ht 152.4 cm (60\")   Wt 81.2 kg (179 lb)   SpO2 97%   BMI 34.96 kg/m²     Physical Exam  Constitutional:       Appearance: Normal appearance. She is normal weight.   HENT:      Head: Normocephalic and atraumatic.      Nose: Nose normal.      Mouth/Throat:      Mouth: Mucous membranes are moist.      Pharynx: Oropharynx is clear.   Eyes:      Extraocular Movements: Extraocular movements intact.      Conjunctiva/sclera: Conjunctivae normal.      Pupils: Pupils are equal, round, and reactive to light.   Neck:      Thyroid: No thyroid mass, thyromegaly or thyroid tenderness.   Cardiovascular:      Rate and Rhythm: Normal rate and regular rhythm.      Heart sounds: Normal heart sounds.   Pulmonary:      Effort: Pulmonary effort is normal.      Breath sounds: Normal breath sounds.   Skin:     General: Skin is warm and dry.   Neurological:      General: No focal deficit present.      Mental Status: She is alert and oriented to person, place, and time.   Psychiatric:         Mood and Affect: Mood normal.         Behavior: Behavior normal.         Thought Content: Thought content normal.        Result Review :{Labs  Result Review  Imaging  Med Tab  Media  Procedures :23}                 Assessment and Plan    Diagnoses and all orders for this visit:    1. Iron deficiency (Primary)  Assessment & Plan:  Continue oral supplement, iron labs today.    Orders:  -     CBC & Differential  -     Iron and TIBC  -     Ferritin    2. Stage 4 chronic kidney disease (HCC)  Assessment & Plan:  Most recent GFR 25.  CMP in clinic today.  Patient to schedule follow-up with nephrology, will continue to monitor.    Orders:  -     Comprehensive Metabolic Panel    3. Essential hypertension  Assessment & Plan:  Well controlled, continue Coreg.  Patient to restart Lasix if blood pressure readings " increase to greater than 130/80 or lower extremity edema develops.  Will continue to monitor.    Orders:  -     Comprehensive Metabolic Panel    4. Vitamin D deficiency  Assessment & Plan:  Continue vitamin D supplement, vitamin D level with labs.    Orders:  -     Vitamin D 25 hydroxy    5. Hyperlipidemia, unspecified hyperlipidemia type  Assessment & Plan:  Well controlled, continue statin. Most recent LDL 70. Lipid panel with labs.      Orders:  -     Lipid Panel    6. Gastroesophageal reflux disease without esophagitis  Assessment & Plan:  Well controlled, continue pantoprazole.      7. Major depressive disorder, recurrent, mild (HCC)  Assessment & Plan:  Anxiety/depression well controlled, continue Lexapro. Encouraged patient to continue to monitor. She will seek medical attention immediately if she feels that her mental health is deteriorating. Denies SI/HI. Will continue to monitor.       8. Anxiety    9. SEVEN (obstructive sleep apnea)  Assessment & Plan:  Patient to continue to follow with sleep medicine.      10. Hyperthyroidism  Assessment & Plan:  History of management with methimazole, patient had been off medication greater than 2 years.  Repeat thyroid labs today, repeat thyroid ultrasound 2023.    Orders:  -     TSH  -     T4, Free    11. Screening for condition  Comments:  Hepatitis C screening in clinic today.  Orders:  -     Hepatitis C antibody    12. History of COVID-19  Assessment & Plan:  Exam reassuring, lung sounds clear, O2 sat 97%.  Discussed with patient that symptoms are likely normal recovery from COVID-19, however due to duration of symptoms will repeat CXR in clinic today.  Could consider chest CT and/or pulmonology referral in the future if symptoms do not continue to improve.    Orders:  -     XR Chest PA & Lateral (In Office)    13. Cough  -     XR Chest PA & Lateral (In Office)    14. Need for pneumococcal vaccine  Comments:  Pneumovax today.  Orders:  -     Pneumococcal  polysaccharide vaccine 23-valent >= 3yo subcutaneous/IM (PPSV23)    15. Multiple joint pain  Assessment & Plan:  Patient to continue to follow with pain management for management of Norco, will continue to monitor.      16. Insomnia, unspecified type  Assessment & Plan:  Continue trazodone as needed.      Other orders  -     ferrous sulfate 325 (65 FE) MG tablet; Take 1 tablet by mouth Daily.  Dispense: 90 tablet; Refill: 1  -     traZODone (DESYREL) 100 MG tablet; Take 1 tablet by mouth Every Night.  Dispense: 90 tablet; Refill: 1  -     escitalopram (LEXAPRO) 20 MG tablet; Take 1 tablet by mouth Daily.  Dispense: 90 tablet; Refill: 1  -     simvastatin (ZOCOR) 20 MG tablet; Take 1 tablet by mouth Every Night.  Dispense: 90 tablet; Refill: 1  -     carvedilol (COREG) 3.125 MG tablet; Take 1 tablet by mouth 2 (Two) Times a Day With Meals.  Dispense: 180 tablet; Refill: 0  -     hydrOXYzine (ATARAX) 50 MG tablet; Take 1 tablet by mouth 3 (Three) Times a Day As Needed for Itching.  Dispense: 270 tablet; Refill: 1      Follow Up   Return in about 4 months (around 6/21/2022).  Patient was given instructions and counseling regarding her condition or for health maintenance advice. Please see specific information pulled into the AVS if appropriate.

## 2022-02-21 NOTE — ASSESSMENT & PLAN NOTE
Patient to continue to follow with pain management for management of Norco, will continue to monitor.

## 2022-02-21 NOTE — ASSESSMENT & PLAN NOTE
History of management with methimazole, patient had been off medication greater than 2 years.  Repeat thyroid labs today, repeat thyroid ultrasound 2023.

## 2022-02-21 NOTE — ASSESSMENT & PLAN NOTE
Exam reassuring, lung sounds clear, O2 sat 97%.  Discussed with patient that symptoms are likely normal recovery from COVID-19, however due to duration of symptoms will repeat CXR in clinic today.  Could consider chest CT and/or pulmonology referral in the future if symptoms do not continue to improve.

## 2022-02-22 ENCOUNTER — TELEPHONE (OUTPATIENT)
Dept: INTERNAL MEDICINE | Facility: CLINIC | Age: 67
End: 2022-02-22

## 2022-02-22 DIAGNOSIS — E78.5 HYPERLIPIDEMIA, UNSPECIFIED HYPERLIPIDEMIA TYPE: Primary | ICD-10-CM

## 2022-02-22 DIAGNOSIS — E05.90 HYPERTHYROIDISM: Primary | ICD-10-CM

## 2022-02-22 LAB
25(OH)D3 SERPL-MCNC: 69.2 NG/ML (ref 30–100)
ALBUMIN SERPL-MCNC: 4 G/DL (ref 3.5–5.2)
ALBUMIN/GLOB SERPL: 1.1 G/DL
ALP SERPL-CCNC: 78 U/L (ref 39–117)
ALT SERPL W P-5'-P-CCNC: 46 U/L (ref 1–33)
ANION GAP SERPL CALCULATED.3IONS-SCNC: 10.3 MMOL/L (ref 5–15)
AST SERPL-CCNC: 38 U/L (ref 1–32)
BILIRUB SERPL-MCNC: 0.3 MG/DL (ref 0–1.2)
BUN SERPL-MCNC: 42 MG/DL (ref 8–23)
BUN/CREAT SERPL: 19.9 (ref 7–25)
CALCIUM SPEC-SCNC: 10 MG/DL (ref 8.6–10.5)
CHLORIDE SERPL-SCNC: 103 MMOL/L (ref 98–107)
CHOLEST SERPL-MCNC: 187 MG/DL (ref 0–200)
CO2 SERPL-SCNC: 24.7 MMOL/L (ref 22–29)
CREAT SERPL-MCNC: 2.11 MG/DL (ref 0.57–1)
FERRITIN SERPL-MCNC: 698 NG/ML (ref 13–150)
GFR SERPL CREATININE-BSD FRML MDRD: 23 ML/MIN/1.73
GLOBULIN UR ELPH-MCNC: 3.6 GM/DL
GLUCOSE SERPL-MCNC: 89 MG/DL (ref 65–99)
HCV AB SER DONR QL: NORMAL
HDLC SERPL-MCNC: 68 MG/DL (ref 40–60)
IRON 24H UR-MRATE: 92 MCG/DL (ref 37–145)
IRON SATN MFR SERPL: 29 % (ref 20–50)
LDLC SERPL CALC-MCNC: 104 MG/DL (ref 0–100)
LDLC/HDLC SERPL: 1.5 {RATIO}
POTASSIUM SERPL-SCNC: 4.9 MMOL/L (ref 3.5–5.2)
PROT SERPL-MCNC: 7.6 G/DL (ref 6–8.5)
SODIUM SERPL-SCNC: 138 MMOL/L (ref 136–145)
T4 FREE SERPL-MCNC: 0.9 NG/DL (ref 0.93–1.7)
TIBC SERPL-MCNC: 322 MCG/DL (ref 298–536)
TRANSFERRIN SERPL-MCNC: 216 MG/DL (ref 200–360)
TRIGL SERPL-MCNC: 85 MG/DL (ref 0–150)
TSH SERPL DL<=0.05 MIU/L-ACNC: 1.47 UIU/ML (ref 0.27–4.2)
VLDLC SERPL-MCNC: 15 MG/DL (ref 5–40)

## 2022-02-22 RX ORDER — SIMVASTATIN 40 MG
40 TABLET ORAL NIGHTLY
Qty: 30 TABLET | Refills: 1 | Status: SHIPPED | OUTPATIENT
Start: 2022-02-22 | End: 2022-05-10 | Stop reason: SDUPTHER

## 2022-02-22 NOTE — TELEPHONE ENCOUNTER
----- Message from MAYUR Nelson sent at 2/22/2022  7:43 AM EST -----  GFR stable at 23. Liver enzymes elevated but stable from previous labs. Please ask patient if she has ever been evaluated for this, will consider further labs and imaging of the liver in the future if she has not. LDL, bad cholesterol, elevated. Previously well controlled, has she been taking the statin as prescribed? If she has please increase dosage to 40 mg and send to pharmacy. Ferritin elevated as previously discussed. TSH normal, circulating thyroid hormone slightly decreased. This is likely a fluctuation, will repeat in 6 weeks. All other labs normal.

## 2022-02-28 ENCOUNTER — TELEPHONE (OUTPATIENT)
Dept: INTERNAL MEDICINE | Facility: CLINIC | Age: 67
End: 2022-02-28

## 2022-02-28 NOTE — TELEPHONE ENCOUNTER
Message taken off voicemail.    Pt stated the [facility] told her she needed an appt for any further refills.    LOV - 2/21/2022

## 2022-02-28 NOTE — TELEPHONE ENCOUNTER
Informed pt that the note was for the refills before the last ones she did.    No worries.      Patient verbalized understanding.

## 2022-05-10 DIAGNOSIS — E78.5 HYPERLIPIDEMIA, UNSPECIFIED HYPERLIPIDEMIA TYPE: ICD-10-CM

## 2022-05-10 RX ORDER — SIMVASTATIN 40 MG
40 TABLET ORAL NIGHTLY
Qty: 30 TABLET | Refills: 1 | Status: SHIPPED | OUTPATIENT
Start: 2022-05-10 | End: 2022-07-11

## 2022-05-10 NOTE — TELEPHONE ENCOUNTER
Caller: Ro Adorno    Relationship: Self    Best call back number: [phone]    Requested Prescriptions:   Requested Prescriptions     Pending Prescriptions Disp Refills   • simvastatin (Zocor) 40 MG tablet 30 tablet 1     Sig: Take 1 tablet by mouth Every Night.        Pharmacy where request should be sent: [facility] [address], [place] - [address]  [phone] [facility] [phone]      Additional details provided by patient:     THE PATIENT IS REQUESTING PCP ANIKA TO GIVE HER A 90 DAYS SUPPLY OF THIS MEDICATION. SHE SAID PCP USUALLY GIVE HER 90 DAYS SUPPLY WITH REFILLS, BUT ONLY RECEIVED A 30 DAYS SUPPLY.       Does the patient have less than a 3 day supply:  [] Yes  [x] No    Faby Frazier Rep   05/10/22 09:35 EDT

## 2022-06-01 RX ORDER — CARVEDILOL 3.12 MG/1
TABLET ORAL
Qty: 180 TABLET | Refills: 0 | Status: SHIPPED | OUTPATIENT
Start: 2022-06-01 | End: 2022-08-29

## 2022-06-01 RX ORDER — FUROSEMIDE 20 MG/1
TABLET ORAL
Qty: 90 TABLET | Refills: 0 | Status: SHIPPED | OUTPATIENT
Start: 2022-06-01 | End: 2022-11-28

## 2022-06-01 RX ORDER — HYDROXYZINE 50 MG/1
TABLET, FILM COATED ORAL
Qty: 270 TABLET | Refills: 1 | OUTPATIENT
Start: 2022-06-01

## 2022-06-01 NOTE — TELEPHONE ENCOUNTER
Patient was given #270 with 1 refill for the Hydroxyzine. She should have enough to last until August and the Furosemide see protocols.

## 2022-06-13 ENCOUNTER — TRANSCRIBE ORDERS (OUTPATIENT)
Dept: ADMINISTRATIVE | Facility: HOSPITAL | Age: 67
End: 2022-06-13

## 2022-06-13 DIAGNOSIS — M17.0 PRIMARY OSTEOARTHRITIS OF BOTH KNEES: Primary | ICD-10-CM

## 2022-06-16 ENCOUNTER — HOSPITAL ENCOUNTER (OUTPATIENT)
Dept: GENERAL RADIOLOGY | Facility: HOSPITAL | Age: 67
Discharge: HOME | End: 2022-06-16
Admitting: NURSE PRACTITIONER

## 2022-06-16 DIAGNOSIS — M17.0 PRIMARY OSTEOARTHRITIS OF BOTH KNEES: ICD-10-CM

## 2022-06-16 PROCEDURE — 73562 X-RAY EXAM OF KNEE 3: CPT

## 2022-06-21 ENCOUNTER — OFFICE VISIT (OUTPATIENT)
Dept: INTERNAL MEDICINE | Facility: CLINIC | Age: 67
End: 2022-06-21

## 2022-06-21 VITALS
HEIGHT: 60 IN | OXYGEN SATURATION: 98 % | DIASTOLIC BLOOD PRESSURE: 60 MMHG | SYSTOLIC BLOOD PRESSURE: 118 MMHG | TEMPERATURE: 97.1 F | WEIGHT: 188.7 LBS | BODY MASS INDEX: 37.05 KG/M2 | HEART RATE: 87 BPM

## 2022-06-21 DIAGNOSIS — E55.9 VITAMIN D DEFICIENCY: ICD-10-CM

## 2022-06-21 DIAGNOSIS — G47.00 INSOMNIA, UNSPECIFIED TYPE: ICD-10-CM

## 2022-06-21 DIAGNOSIS — G47.33 OSA (OBSTRUCTIVE SLEEP APNEA): ICD-10-CM

## 2022-06-21 DIAGNOSIS — E78.5 HYPERLIPIDEMIA, UNSPECIFIED HYPERLIPIDEMIA TYPE: ICD-10-CM

## 2022-06-21 DIAGNOSIS — E05.90 HYPERTHYROIDISM: ICD-10-CM

## 2022-06-21 DIAGNOSIS — N18.4 STAGE 4 CHRONIC KIDNEY DISEASE: ICD-10-CM

## 2022-06-21 DIAGNOSIS — M25.50 MULTIPLE JOINT PAIN: ICD-10-CM

## 2022-06-21 DIAGNOSIS — E61.1 IRON DEFICIENCY: ICD-10-CM

## 2022-06-21 DIAGNOSIS — R35.0 URINARY FREQUENCY: Primary | ICD-10-CM

## 2022-06-21 DIAGNOSIS — F33.0 MAJOR DEPRESSIVE DISORDER, RECURRENT, MILD: ICD-10-CM

## 2022-06-21 DIAGNOSIS — I10 ESSENTIAL HYPERTENSION: ICD-10-CM

## 2022-06-21 DIAGNOSIS — F41.9 ANXIETY: ICD-10-CM

## 2022-06-21 DIAGNOSIS — K21.9 GASTROESOPHAGEAL REFLUX DISEASE WITHOUT ESOPHAGITIS: ICD-10-CM

## 2022-06-21 LAB

## 2022-06-21 PROCEDURE — 99214 OFFICE O/P EST MOD 30 MIN: CPT | Performed by: NURSE PRACTITIONER

## 2022-06-21 PROCEDURE — 87086 URINE CULTURE/COLONY COUNT: CPT | Performed by: NURSE PRACTITIONER

## 2022-06-21 PROCEDURE — 81001 URINALYSIS AUTO W/SCOPE: CPT | Performed by: NURSE PRACTITIONER

## 2022-06-21 RX ORDER — UREA 10 %
1 LOTION (ML) TOPICAL
COMMUNITY

## 2022-06-21 RX ORDER — NITROFURANTOIN 25; 75 MG/1; MG/1
100 CAPSULE ORAL 2 TIMES DAILY
Qty: 10 CAPSULE | Refills: 0 | Status: SHIPPED | OUTPATIENT
Start: 2022-06-21 | End: 2022-06-26

## 2022-06-21 RX ORDER — DAPAGLIFLOZIN 10 MG/1
10 TABLET, FILM COATED ORAL DAILY
Qty: 90 TABLET | Refills: 1 | Status: SHIPPED | OUTPATIENT
Start: 2022-06-21 | End: 2022-12-20

## 2022-06-21 RX ORDER — FLUCONAZOLE 150 MG/1
150 TABLET ORAL ONCE
Qty: 2 TABLET | Refills: 0 | Status: SHIPPED | OUTPATIENT
Start: 2022-06-21 | End: 2022-06-21

## 2022-06-21 NOTE — ASSESSMENT & PLAN NOTE
Will treat with Macrobid at this time. Urine for micro and culture, will determine if antibiotic adjustment is warranted based on results. Increase water intake, monitor output. Seek medical attention immediately with nausea, vomiting, fever, chills, flank pain, hematuria. Patient will call or return to clinic with concerns.

## 2022-06-21 NOTE — PROGRESS NOTES
Chief Complaint  Follow-up (4 month /) and Urinary Tract Infection (UTI- frequency, burning, pain. )    Subjective        Ro Adorno presents to [facility]  Patient reports urinary frequency, urgency and dysuria x 4 days. Denies fever, chills, nausea, vomiting, flank pain. Last UTI has been over two years ago.     CKD-  Most recent GFR 23. Recent follow up with Dr. Baum, nephrology, Jardiance was prescribed for renal protection but this was too expensive for her. Patient states she was told Farxiga was usually more expensive but she would like to try to have this prescribed to see if her insurance would cover this instead. Patient continues to take hydroxyzine for itching. GFR has remained stable since bariatric surgery and weight loss.      HLD-  Managed with statin, increased with last labs from 70 to 104. Statin was increased at that time.  Well-tolerated, denies leg cramping.      GERD-  Previously managed with pantoprazole, stopped after bariatric surgery. Has had breakthrough symptoms recently but not enough to need medication.      Anxiety/depression/insomnia-  Well controlled with Lexapro.  Denies SI/HI.  Patient continues to alternate trazodone, melatonin and Tylenol PM as needed.      Hyperthyroid-  Previously managed with methimazole, patient has been off of medication for greater than 2 years.  Thyroid ultrasound 1/2021, stable, repeat 2023.     SEVEN-  Patient previously managed with CPAP, patient continues to use recalled machine per recommendation from sleep medicine.      Iron deficiency-  Patient continues to take oral iron supplement once daily.     HTN-  Managed with Coreg and Lasix.  Patient reports home readings 120s/80s.  Denies chest pain, blurry vision, headache, leg swelling.     Chronic pain-  Managed by Pain Management with Norco and muscle relaxer. Scheduled for thoracic nerve ablation next month and bilateral knee injections  "tomorrow. Patient states she has been less active due to the pain and has had some weight gain. States that she hasn't felt like going to the pool because of her discomfort.     PAP: 2019, normal   Mammogram: 1/2022; Denies family history of breast cancer  DEXA: 11/2020  Hepatitis C screening: Negative  Colonoscopy: 2019; Denies family history of colon cancer  COVID19 vaccination: Up to date  Pneumonia vaccination: Up to date      Objective   Vital Signs:  /60 (BP Location: Left arm, Patient Position: Sitting, Cuff Size: Adult)   Pulse 87   Temp 97.1 °F (36.2 °C) (Temporal)   Ht 152.4 cm (60\")   Wt 85.6 kg (188 lb 11.2 oz)   SpO2 98%   BMI 36.85 kg/m²   Estimated body mass index is 36.85 kg/m² as calculated from the following:    Height as of this encounter: 152.4 cm (60\").    Weight as of this encounter: 85.6 kg (188 lb 11.2 oz).          Physical Exam  Constitutional:       Appearance: Normal appearance.   HENT:      Head: Normocephalic and atraumatic.      Nose: Nose normal.      Mouth/Throat:      Mouth: Mucous membranes are moist.      Pharynx: Oropharynx is clear.   Eyes:      Extraocular Movements: Extraocular movements intact.      Conjunctiva/sclera: Conjunctivae normal.      Pupils: Pupils are equal, round, and reactive to light.   Neck:      Thyroid: No thyroid mass, thyromegaly or thyroid tenderness.   Cardiovascular:      Rate and Rhythm: Normal rate and regular rhythm.      Heart sounds: Normal heart sounds.   Pulmonary:      Effort: Pulmonary effort is normal.      Breath sounds: Normal breath sounds.   Skin:     General: Skin is warm and dry.   Neurological:      General: No focal deficit present.      Mental Status: She is alert and oriented to person, place, and time.   Psychiatric:         Mood and Affect: Mood normal.         Behavior: Behavior normal.         Thought Content: Thought content normal.        Result Review :                Assessment and Plan   Diagnoses and all orders " for this visit:    1. Urinary frequency (Primary)  Assessment & Plan:  Will treat with Macrobid at this time. Urine for micro and culture, will determine if antibiotic adjustment is warranted based on results. Increase water intake, monitor output. Seek medical attention immediately with nausea, vomiting, fever, chills, flank pain, hematuria. Patient will call or return to clinic with concerns.       Orders:  -     Urinalysis With Culture If Indicated - Urine, Clean Catch; Future  -     Urinalysis With Culture If Indicated - Urine, Clean Catch  -     Urinalysis, Microscopic Only - Urine, Clean Catch  -     Urine Culture - Urine, Urine, Clean Catch    2. Essential hypertension  Assessment & Plan:  Well controlled, continue Coreg and Lasix. Encouraged patient to continue to monitor blood pressure at home and to call or return to clinic with consistent elevations greater than 130/80. Labs ordered today, patient to return to have drawn at a later date.       Orders:  -     CBC w AUTO Differential; Future  -     Comprehensive metabolic panel; Future    3. Hyperthyroidism  Assessment & Plan:  History of medication management, repeat thyroid labs ordered.    Orders:  -     TSH; Future  -     T4, free; Future    4. Iron deficiency  Assessment & Plan:  Continue iron supplement despite elevated ferritin levels which have been contributed to CKD. Iron studies with labs.    Orders:  -     Iron and TIBC; Future  -     Ferritin; Future    5. Vitamin D deficiency  Assessment & Plan:  Continue vitamin D supplement, vitamin D level with labs today.       Orders:  -     Vitamin D 25 hydroxy; Future    6. Stage 4 chronic kidney disease (HCC)  Assessment & Plan:  Most recent GFR 23, CMP ordered. Follow up with nephrology as scheduled. Will continue to monitor.       7. Hyperlipidemia, unspecified hyperlipidemia type  Assessment & Plan:  Most recent , statin increased at that time. Lipid panel with labs.      Orders:  -     Lipid  panel; Future    8. Major depressive disorder, recurrent, mild (HCC)  Assessment & Plan:  Anxiety/depression well controlled, continue Lexapro. Encouraged patient to continue to monitor. She will seek medical attention immediately if she feels that her mental health is deteriorating. Denies SI/HI. Will continue to monitor.         9. Anxiety    10. Gastroesophageal reflux disease without esophagitis  Assessment & Plan:  Recent breakthrough symptoms, could consider restarting PPI in the future if these become more frequent.       11. SEVEN (obstructive sleep apnea)  Assessment & Plan:  Continue CPAP and follow up with sleep medicine as scheduled.      12. Insomnia, unspecified type  Assessment & Plan:  Continue trazodone PRN.      13. Multiple joint pain  Assessment & Plan:  Continue to follow with pain management for management of Norco, muscle relaxer, injections and ablation therapy. Will continue to monitor.       Other orders  -     dapagliflozin Propanediol (Farxiga) 10 MG tablet; Take 10 mg by mouth Daily.  Dispense: 90 tablet; Refill: 1  -     nitrofurantoin, macrocrystal-monohydrate, (Macrobid) 100 MG capsule; Take 1 capsule by mouth 2 (Two) Times a Day for 5 days.  Dispense: 10 capsule; Refill: 0  -     fluconazole (Diflucan) 150 MG tablet; Take 1 tablet by mouth 1 (One) Time for 1 dose. Repeat in 3 days if symptoms do not improve  Dispense: 2 tablet; Refill: 0           Follow Up   Return in about 6 months (around 12/21/2022).  Patient was given instructions and counseling regarding her condition or for health maintenance advice. Please see specific information pulled into the AVS if appropriate.

## 2022-06-21 NOTE — ASSESSMENT & PLAN NOTE
Most recent GFR 23, CMP ordered. Follow up with nephrology as scheduled. Will continue to monitor.

## 2022-06-21 NOTE — ASSESSMENT & PLAN NOTE
Continue iron supplement despite elevated ferritin levels which have been contributed to CKD. Iron studies with labs.

## 2022-06-21 NOTE — ASSESSMENT & PLAN NOTE
Recent breakthrough symptoms, could consider restarting PPI in the future if these become more frequent.

## 2022-06-21 NOTE — ASSESSMENT & PLAN NOTE
Well controlled, continue Coreg and Lasix. Encouraged patient to continue to monitor blood pressure at home and to call or return to clinic with consistent elevations greater than 130/80. Labs ordered today, patient to return to have drawn at a later date.

## 2022-06-21 NOTE — ASSESSMENT & PLAN NOTE
Continue to follow with pain management for management of Norco, muscle relaxer, injections and ablation therapy. Will continue to monitor.

## 2022-06-22 LAB — BACTERIA SPEC AEROBE CULT: NO GROWTH

## 2022-07-11 DIAGNOSIS — E78.5 HYPERLIPIDEMIA, UNSPECIFIED HYPERLIPIDEMIA TYPE: ICD-10-CM

## 2022-07-11 RX ORDER — SIMVASTATIN 40 MG
TABLET ORAL
Qty: 30 TABLET | Refills: 1 | Status: SHIPPED | OUTPATIENT
Start: 2022-07-11 | End: 2022-09-29

## 2022-08-26 ENCOUNTER — LAB (OUTPATIENT)
Dept: LAB | Facility: HOSPITAL | Age: 67
End: 2022-08-26

## 2022-08-26 ENCOUNTER — TRANSCRIBE ORDERS (OUTPATIENT)
Dept: ADMINISTRATIVE | Facility: HOSPITAL | Age: 67
End: 2022-08-26

## 2022-08-26 DIAGNOSIS — I10 ESSENTIAL HYPERTENSION: ICD-10-CM

## 2022-08-26 DIAGNOSIS — E55.9 VITAMIN D DEFICIENCY: ICD-10-CM

## 2022-08-26 DIAGNOSIS — E78.5 HYPERLIPIDEMIA, UNSPECIFIED HYPERLIPIDEMIA TYPE: ICD-10-CM

## 2022-08-26 DIAGNOSIS — N18.4 CHRONIC KIDNEY DISEASE, STAGE IV (SEVERE): ICD-10-CM

## 2022-08-26 DIAGNOSIS — E05.90 HYPERTHYROIDISM: ICD-10-CM

## 2022-08-26 DIAGNOSIS — E61.1 IRON DEFICIENCY: ICD-10-CM

## 2022-08-26 DIAGNOSIS — N18.4 CHRONIC KIDNEY DISEASE, STAGE IV (SEVERE): Primary | ICD-10-CM

## 2022-08-26 LAB
25(OH)D3 SERPL-MCNC: 90.2 NG/ML (ref 30–100)
ALBUMIN SERPL-MCNC: 4.3 G/DL (ref 3.5–5.2)
ALBUMIN/GLOB SERPL: 1.5 G/DL
ALP SERPL-CCNC: 79 U/L (ref 39–117)
ALT SERPL W P-5'-P-CCNC: 30 U/L (ref 1–33)
ANION GAP SERPL CALCULATED.3IONS-SCNC: 15 MMOL/L (ref 5–15)
AST SERPL-CCNC: 37 U/L (ref 1–32)
BACTERIA UR QL AUTO: ABNORMAL /HPF
BASOPHILS # BLD AUTO: 0.09 10*3/MM3 (ref 0–0.2)
BASOPHILS NFR BLD AUTO: 0.8 % (ref 0–1.5)
BILIRUB SERPL-MCNC: 0.3 MG/DL (ref 0–1.2)
BILIRUB UR QL STRIP: NEGATIVE
BUN SERPL-MCNC: 46 MG/DL (ref 8–23)
BUN/CREAT SERPL: 18.9 (ref 7–25)
CALCIUM SPEC-SCNC: 9.4 MG/DL (ref 8.6–10.5)
CHLORIDE SERPL-SCNC: 105 MMOL/L (ref 98–107)
CHOLEST SERPL-MCNC: 145 MG/DL (ref 0–200)
CLARITY UR: CLEAR
CO2 SERPL-SCNC: 21 MMOL/L (ref 22–29)
COLOR UR: YELLOW
CREAT SERPL-MCNC: 2.43 MG/DL (ref 0.57–1)
CREAT UR-MCNC: 34.2 MG/DL
DEPRECATED RDW RBC AUTO: 41.9 FL (ref 37–54)
EGFRCR SERPLBLD CKD-EPI 2021: 21.3 ML/MIN/1.73
EOSINOPHIL # BLD AUTO: 0.47 10*3/MM3 (ref 0–0.4)
EOSINOPHIL NFR BLD AUTO: 4.2 % (ref 0.3–6.2)
ERYTHROCYTE [DISTWIDTH] IN BLOOD BY AUTOMATED COUNT: 12.1 % (ref 12.3–15.4)
FERRITIN SERPL-MCNC: 670 NG/ML (ref 13–150)
GLOBULIN UR ELPH-MCNC: 2.8 GM/DL
GLUCOSE SERPL-MCNC: 96 MG/DL (ref 65–99)
GLUCOSE UR STRIP-MCNC: NEGATIVE MG/DL
HCT VFR BLD AUTO: 37 % (ref 34–46.6)
HDLC SERPL-MCNC: 73 MG/DL (ref 40–60)
HGB BLD-MCNC: 12.1 G/DL (ref 12–15.9)
HGB UR QL STRIP.AUTO: ABNORMAL
HYALINE CASTS UR QL AUTO: ABNORMAL /LPF
IMM GRANULOCYTES # BLD AUTO: 0.02 10*3/MM3 (ref 0–0.05)
IMM GRANULOCYTES NFR BLD AUTO: 0.2 % (ref 0–0.5)
IRON 24H UR-MRATE: 56 MCG/DL (ref 37–145)
IRON SATN MFR SERPL: 17 % (ref 20–50)
KETONES UR QL STRIP: NEGATIVE
LDLC SERPL CALC-MCNC: 51 MG/DL (ref 0–100)
LDLC/HDLC SERPL: 0.65 {RATIO}
LEUKOCYTE ESTERASE UR QL STRIP.AUTO: ABNORMAL
LYMPHOCYTES # BLD AUTO: 3.58 10*3/MM3 (ref 0.7–3.1)
LYMPHOCYTES NFR BLD AUTO: 32 % (ref 19.6–45.3)
MCH RBC QN AUTO: 30.6 PG (ref 26.6–33)
MCHC RBC AUTO-ENTMCNC: 32.7 G/DL (ref 31.5–35.7)
MCV RBC AUTO: 93.7 FL (ref 79–97)
MONOCYTES # BLD AUTO: 1.13 10*3/MM3 (ref 0.1–0.9)
MONOCYTES NFR BLD AUTO: 10.1 % (ref 5–12)
NEUTROPHILS NFR BLD AUTO: 5.91 10*3/MM3 (ref 1.7–7)
NEUTROPHILS NFR BLD AUTO: 52.7 % (ref 42.7–76)
NITRITE UR QL STRIP: NEGATIVE
NRBC BLD AUTO-RTO: 0 /100 WBC (ref 0–0.2)
PH UR STRIP.AUTO: 6 [PH] (ref 5–8)
PHOSPHATE SERPL-MCNC: 3.9 MG/DL (ref 2.5–4.5)
PLATELET # BLD AUTO: 222 10*3/MM3 (ref 140–450)
PMV BLD AUTO: 12.3 FL (ref 6–12)
POTASSIUM SERPL-SCNC: 4 MMOL/L (ref 3.5–5.2)
PROT ?TM UR-MCNC: 4.5 MG/DL
PROT SERPL-MCNC: 7.1 G/DL (ref 6–8.5)
PROT UR QL STRIP: NEGATIVE
PROT/CREAT UR: 0.13 MG/G{CREAT}
PTH-INTACT SERPL-MCNC: 73.2 PG/ML (ref 15–65)
RBC # BLD AUTO: 3.95 10*6/MM3 (ref 3.77–5.28)
RBC # UR STRIP: ABNORMAL /HPF
REF LAB TEST METHOD: ABNORMAL
SODIUM SERPL-SCNC: 141 MMOL/L (ref 136–145)
SP GR UR STRIP: 1.01 (ref 1–1.03)
SQUAMOUS #/AREA URNS HPF: ABNORMAL /HPF
T4 FREE SERPL-MCNC: 0.93 NG/DL (ref 0.93–1.7)
TIBC SERPL-MCNC: 325 MCG/DL (ref 298–536)
TRANSFERRIN SERPL-MCNC: 218 MG/DL (ref 200–360)
TRIGL SERPL-MCNC: 121 MG/DL (ref 0–150)
TSH SERPL DL<=0.05 MIU/L-ACNC: 0.95 UIU/ML (ref 0.27–4.2)
UROBILINOGEN UR QL STRIP: ABNORMAL
VLDLC SERPL-MCNC: 21 MG/DL (ref 5–40)
WBC # UR STRIP: ABNORMAL /HPF
WBC NRBC COR # BLD: 11.2 10*3/MM3 (ref 3.4–10.8)

## 2022-08-26 PROCEDURE — 85025 COMPLETE CBC W/AUTO DIFF WBC: CPT

## 2022-08-26 PROCEDURE — 83970 ASSAY OF PARATHORMONE: CPT

## 2022-08-26 PROCEDURE — 80061 LIPID PANEL: CPT

## 2022-08-26 PROCEDURE — 84156 ASSAY OF PROTEIN URINE: CPT

## 2022-08-26 PROCEDURE — 84443 ASSAY THYROID STIM HORMONE: CPT

## 2022-08-26 PROCEDURE — 82728 ASSAY OF FERRITIN: CPT

## 2022-08-26 PROCEDURE — 81001 URINALYSIS AUTO W/SCOPE: CPT

## 2022-08-26 PROCEDURE — 84466 ASSAY OF TRANSFERRIN: CPT

## 2022-08-26 PROCEDURE — 84100 ASSAY OF PHOSPHORUS: CPT

## 2022-08-26 PROCEDURE — 84439 ASSAY OF FREE THYROXINE: CPT

## 2022-08-26 PROCEDURE — 80053 COMPREHEN METABOLIC PANEL: CPT

## 2022-08-26 PROCEDURE — 82306 VITAMIN D 25 HYDROXY: CPT

## 2022-08-26 PROCEDURE — 83540 ASSAY OF IRON: CPT

## 2022-08-26 PROCEDURE — 36415 COLL VENOUS BLD VENIPUNCTURE: CPT

## 2022-08-26 PROCEDURE — 82570 ASSAY OF URINE CREATININE: CPT

## 2022-08-29 DIAGNOSIS — D72.829 LEUKOCYTOSIS, UNSPECIFIED TYPE: Primary | ICD-10-CM

## 2022-08-29 RX ORDER — CARVEDILOL 3.12 MG/1
3.12 TABLET ORAL 2 TIMES DAILY WITH MEALS
Qty: 180 TABLET | Refills: 0 | Status: SHIPPED | OUTPATIENT
Start: 2022-08-29 | End: 2022-11-28

## 2022-08-29 RX ORDER — CARVEDILOL 3.12 MG/1
TABLET ORAL
Qty: 180 TABLET | Refills: 0 | Status: SHIPPED | OUTPATIENT
Start: 2022-08-29 | End: 2022-08-29 | Stop reason: SDUPTHER

## 2022-08-29 RX ORDER — ESCITALOPRAM OXALATE 20 MG/1
TABLET ORAL
Qty: 90 TABLET | Refills: 1 | Status: SHIPPED | OUTPATIENT
Start: 2022-08-29 | End: 2022-08-29 | Stop reason: SDUPTHER

## 2022-08-29 RX ORDER — ESCITALOPRAM OXALATE 20 MG/1
20 TABLET ORAL DAILY
Qty: 90 TABLET | Refills: 1 | Status: SHIPPED | OUTPATIENT
Start: 2022-08-29 | End: 2023-02-27

## 2022-09-13 ENCOUNTER — APPOINTMENT (OUTPATIENT)
Dept: CT IMAGING | Facility: HOSPITAL | Age: 67
End: 2022-09-13

## 2022-09-13 ENCOUNTER — HOSPITAL ENCOUNTER (EMERGENCY)
Facility: HOSPITAL | Age: 67
Discharge: HOME | End: 2022-09-13
Attending: STUDENT IN AN ORGANIZED HEALTH CARE EDUCATION/TRAINING PROGRAM | Admitting: STUDENT IN AN ORGANIZED HEALTH CARE EDUCATION/TRAINING PROGRAM

## 2022-09-13 ENCOUNTER — APPOINTMENT (OUTPATIENT)
Dept: GENERAL RADIOLOGY | Facility: HOSPITAL | Age: 67
End: 2022-09-13

## 2022-09-13 VITALS
OXYGEN SATURATION: 98 % | SYSTOLIC BLOOD PRESSURE: 163 MMHG | RESPIRATION RATE: 18 BRPM | TEMPERATURE: 98.8 F | DIASTOLIC BLOOD PRESSURE: 84 MMHG | HEART RATE: 93 BPM | HEIGHT: 60 IN | BODY MASS INDEX: 37.74 KG/M2 | WEIGHT: 192.24 LBS

## 2022-09-13 DIAGNOSIS — R05.9 COUGH: Primary | ICD-10-CM

## 2022-09-13 DIAGNOSIS — M79.10 MYALGIA: ICD-10-CM

## 2022-09-13 LAB
027 TOXIN: ABNORMAL
ALBUMIN SERPL-MCNC: 3.8 G/DL (ref 3.5–5.2)
ALBUMIN/GLOB SERPL: 1.1 G/DL
ALP SERPL-CCNC: 96 U/L (ref 39–117)
ALT SERPL W P-5'-P-CCNC: 19 U/L (ref 1–33)
ANION GAP SERPL CALCULATED.3IONS-SCNC: 11.3 MMOL/L (ref 5–15)
AST SERPL-CCNC: 26 U/L (ref 1–32)
BASOPHILS # BLD AUTO: 0.06 10*3/MM3 (ref 0–0.2)
BASOPHILS NFR BLD AUTO: 0.3 % (ref 0–1.5)
BILIRUB SERPL-MCNC: 0.3 MG/DL (ref 0–1.2)
BILIRUB UR QL STRIP: NEGATIVE
BUN SERPL-MCNC: 26 MG/DL (ref 8–23)
BUN/CREAT SERPL: 10.3 (ref 7–25)
C DIFF TOX GENS STL QL NAA+PROBE: POSITIVE
CALCIUM SPEC-SCNC: 9.6 MG/DL (ref 8.6–10.5)
CHLORIDE SERPL-SCNC: 98 MMOL/L (ref 98–107)
CLARITY UR: CLEAR
CO2 SERPL-SCNC: 25.7 MMOL/L (ref 22–29)
COLOR UR: YELLOW
CREAT SERPL-MCNC: 2.53 MG/DL (ref 0.57–1)
DEPRECATED RDW RBC AUTO: 42.9 FL (ref 37–54)
EGFRCR SERPLBLD CKD-EPI 2021: 20.3 ML/MIN/1.73
EOSINOPHIL # BLD AUTO: 0.11 10*3/MM3 (ref 0–0.4)
EOSINOPHIL NFR BLD AUTO: 0.6 % (ref 0.3–6.2)
ERYTHROCYTE [DISTWIDTH] IN BLOOD BY AUTOMATED COUNT: 12.9 % (ref 12.3–15.4)
FLUAV AG NPH QL: NEGATIVE
FLUBV AG NPH QL IA: NEGATIVE
GLOBULIN UR ELPH-MCNC: 3.4 GM/DL
GLUCOSE SERPL-MCNC: 108 MG/DL (ref 65–99)
GLUCOSE UR STRIP-MCNC: NEGATIVE MG/DL
HCT VFR BLD AUTO: 35.4 % (ref 34–46.6)
HGB BLD-MCNC: 11.7 G/DL (ref 12–15.9)
HGB UR QL STRIP.AUTO: NEGATIVE
HOLD SPECIMEN: NORMAL
IMM GRANULOCYTES # BLD AUTO: 0.12 10*3/MM3 (ref 0–0.05)
IMM GRANULOCYTES NFR BLD AUTO: 0.6 % (ref 0–0.5)
KETONES UR QL STRIP: NEGATIVE
LEUKOCYTE ESTERASE UR QL STRIP.AUTO: NEGATIVE
LIPASE SERPL-CCNC: 15 U/L (ref 13–60)
LYMPHOCYTES # BLD AUTO: 2.43 10*3/MM3 (ref 0.7–3.1)
LYMPHOCYTES NFR BLD AUTO: 12.3 % (ref 19.6–45.3)
MAGNESIUM SERPL-MCNC: 2 MG/DL (ref 1.6–2.4)
MCH RBC QN AUTO: 30.2 PG (ref 26.6–33)
MCHC RBC AUTO-ENTMCNC: 33.1 G/DL (ref 31.5–35.7)
MCV RBC AUTO: 91.5 FL (ref 79–97)
MONOCYTES # BLD AUTO: 1.78 10*3/MM3 (ref 0.1–0.9)
MONOCYTES NFR BLD AUTO: 9 % (ref 5–12)
NEUTROPHILS NFR BLD AUTO: 15.3 10*3/MM3 (ref 1.7–7)
NEUTROPHILS NFR BLD AUTO: 77.2 % (ref 42.7–76)
NITRITE UR QL STRIP: NEGATIVE
NRBC BLD AUTO-RTO: 0 /100 WBC (ref 0–0.2)
NT-PROBNP SERPL-MCNC: 805.4 PG/ML (ref 0–900)
PH UR STRIP.AUTO: 6 [PH] (ref 5–8)
PLATELET # BLD AUTO: 181 10*3/MM3 (ref 140–450)
PMV BLD AUTO: 11.3 FL (ref 6–12)
POTASSIUM SERPL-SCNC: 4.1 MMOL/L (ref 3.5–5.2)
PROT SERPL-MCNC: 7.2 G/DL (ref 6–8.5)
PROT UR QL STRIP: NEGATIVE
QT INTERVAL: 375 MS
RBC # BLD AUTO: 3.87 10*6/MM3 (ref 3.77–5.28)
S PYO AG THROAT QL: NEGATIVE
SARS-COV-2 RNA PNL SPEC NAA+PROBE: NOT DETECTED
SODIUM SERPL-SCNC: 135 MMOL/L (ref 136–145)
SP GR UR STRIP: <=1.005 (ref 1–1.03)
TROPONIN I SERPL-MCNC: 0 NG/ML (ref 0–0.08)
UROBILINOGEN UR QL STRIP: NORMAL
WBC NRBC COR # BLD: 19.8 10*3/MM3 (ref 3.4–10.8)
WHOLE BLOOD HOLD COAG: NORMAL
WHOLE BLOOD HOLD SPECIMEN: NORMAL

## 2022-09-13 PROCEDURE — 25010000002 ONDANSETRON PER 1 MG: Performed by: STUDENT IN AN ORGANIZED HEALTH CARE EDUCATION/TRAINING PROGRAM

## 2022-09-13 PROCEDURE — 81003 URINALYSIS AUTO W/O SCOPE: CPT | Performed by: EMERGENCY MEDICINE

## 2022-09-13 PROCEDURE — 84484 ASSAY OF TROPONIN QUANT: CPT

## 2022-09-13 PROCEDURE — 93010 ELECTROCARDIOGRAM REPORT: CPT | Performed by: INTERNAL MEDICINE

## 2022-09-13 PROCEDURE — U0005 INFEC AGEN DETEC AMPLI PROBE: HCPCS | Performed by: EMERGENCY MEDICINE

## 2022-09-13 PROCEDURE — 87081 CULTURE SCREEN ONLY: CPT | Performed by: STUDENT IN AN ORGANIZED HEALTH CARE EDUCATION/TRAINING PROGRAM

## 2022-09-13 PROCEDURE — 87880 STREP A ASSAY W/OPTIC: CPT | Performed by: EMERGENCY MEDICINE

## 2022-09-13 PROCEDURE — 83735 ASSAY OF MAGNESIUM: CPT

## 2022-09-13 PROCEDURE — 74176 CT ABD & PELVIS W/O CONTRAST: CPT

## 2022-09-13 PROCEDURE — 87804 INFLUENZA ASSAY W/OPTIC: CPT | Performed by: EMERGENCY MEDICINE

## 2022-09-13 PROCEDURE — 36415 COLL VENOUS BLD VENIPUNCTURE: CPT

## 2022-09-13 PROCEDURE — 83690 ASSAY OF LIPASE: CPT

## 2022-09-13 PROCEDURE — 87493 C DIFF AMPLIFIED PROBE: CPT | Performed by: STUDENT IN AN ORGANIZED HEALTH CARE EDUCATION/TRAINING PROGRAM

## 2022-09-13 PROCEDURE — 83880 ASSAY OF NATRIURETIC PEPTIDE: CPT

## 2022-09-13 PROCEDURE — 93005 ELECTROCARDIOGRAM TRACING: CPT

## 2022-09-13 PROCEDURE — 85025 COMPLETE CBC W/AUTO DIFF WBC: CPT

## 2022-09-13 PROCEDURE — 71045 X-RAY EXAM CHEST 1 VIEW: CPT

## 2022-09-13 PROCEDURE — 80053 COMPREHEN METABOLIC PANEL: CPT

## 2022-09-13 PROCEDURE — 96374 THER/PROPH/DIAG INJ IV PUSH: CPT

## 2022-09-13 PROCEDURE — 93005 ELECTROCARDIOGRAM TRACING: CPT | Performed by: STUDENT IN AN ORGANIZED HEALTH CARE EDUCATION/TRAINING PROGRAM

## 2022-09-13 PROCEDURE — 87505 NFCT AGENT DETECTION GI: CPT | Performed by: STUDENT IN AN ORGANIZED HEALTH CARE EDUCATION/TRAINING PROGRAM

## 2022-09-13 PROCEDURE — 99284 EMERGENCY DEPT VISIT MOD MDM: CPT

## 2022-09-13 PROCEDURE — U0004 COV-19 TEST NON-CDC HGH THRU: HCPCS | Performed by: EMERGENCY MEDICINE

## 2022-09-13 RX ORDER — SODIUM CHLORIDE 0.9 % (FLUSH) 0.9 %
10 SYRINGE (ML) INJECTION AS NEEDED
Status: DISCONTINUED | OUTPATIENT
Start: 2022-09-13 | End: 2022-09-14 | Stop reason: HOSPADM

## 2022-09-13 RX ORDER — ONDANSETRON 2 MG/ML
4 INJECTION INTRAMUSCULAR; INTRAVENOUS ONCE
Status: COMPLETED | OUTPATIENT
Start: 2022-09-13 | End: 2022-09-13

## 2022-09-13 RX ADMIN — SODIUM CHLORIDE 1000 ML: 9 INJECTION, SOLUTION INTRAVENOUS at 19:45

## 2022-09-13 RX ADMIN — ONDANSETRON 4 MG: 2 INJECTION INTRAMUSCULAR; INTRAVENOUS at 19:46

## 2022-09-13 NOTE — ED PROVIDER NOTES
Time: 6:59 PM EDT  Arrived by: private car  Chief Complaint: diarrhea, nausea, fever,  Cough, headache  History provided by: Pt  History is limited by: N/A     History of Present Illness:  Patient is a 67 y.o. year old female who presents to the emergency department with cough, fever, headache, nausea, and diarrhea. Pt has CKD stage 4 and surgical hx of a gastric sleeve presenting with symptoms of myalgias and multiple episodes of diarrhea.  Patient states her symptoms started yesterday and is progressed through today.  She has been taking Imodium without relief.  She has had no vomiting.  She states she was recently on antibiotics due to a UTI.  She does have a history of CKD stage IV which she follows with her physician.      Similar Symptoms Previously: yes  Recently seen: yes      Patient Care Team  Primary Care Provider: Maria Elena Mullins APRN    Past Medical History:     Allergies   Allergen Reactions   • Contrast Dye Other (See Comments)     CHRONIC KIDNEY DISEASE STAGE IV   • Buprenorphine Unknown - Low Severity   • Flurbiprofen Other (See Comments)     History of renal failure   • Nsaids Other (See Comments)     Cannot take due to kidneys     Past Medical History:   Diagnosis Date   • Acid reflux    • Anemia    • Arthritis     OSTEO   • CKD (chronic kidney disease) stage 4, GFR 15-29 ml/min ([facility])    • DDD (degenerative disc disease), cervical    • DDD (degenerative disc disease), lumbosacral    • DDD (degenerative disc disease), thoracolumbar    • Hyperlipidemia    • Hypertension    • Sleep apnea     CPAP     Past Surgical History:   Procedure Laterality Date   • BACK SURGERY      BURNED NERVE TO RELIEVE PAIN   • COLONOSCOPY  2014   • ENDOSCOPY  2009   • GASTRIC SLEEVE LAPAROSCOPIC N/A 10/30/2019    Procedure: GASTRIC SLEEVE LAPAROSCOPIC With Hiatal Hernia Repair;  Surgeon: Dane Delgado Jr., MD;  Location: [facility] OR [facility] [place], [place];  Service: Bariatric     Family History   Problem Relation Age of Onset   • Obesity  Mother    • Hypertension Mother    • Obesity Sister    • Hypertension Sister    • Stroke Sister    • Obesity Brother    • Hypertension Brother    • Heart attack Brother    • Obesity Maternal Grandmother    • Hypertension Maternal Grandmother    • Cancer Maternal Grandmother         BRAIN   • Cancer Maternal Grandfather    • Hypertension Paternal Grandmother    • Hypertension Brother    • Hypertension Sister    • Stroke Sister    • Malig Hyperthermia Neg Hx        Home Medications:  Prior to Admission medications    Medication Sig Start Date End Date Taking? Authorizing Provider   albuterol (PROVENTIL) (2.5 MG/3ML) 0.083% nebulizer solution Take 2.5 mg by nebulization Every 4 (Four) Hours As Needed for Wheezing. 12/21/21   Maria Elena Mullins APRN   calcium carbonate (TUMS) 500 MG chewable tablet Chew 1 tablet Daily.    ProviderLeonidas MD   carvedilol (COREG) 3.125 MG tablet Take 1 tablet by mouth 2 (Two) Times a Day With Meals. 8/29/22   Maria Elena Mullins APRN   [facility] FIBER GUMMIES PO Take  by mouth.    Leonidas Boyd MD   cyclobenzaprine (FLEXERIL) 10 MG tablet Take 10 mg by mouth 3 (Three) Times a Day As Needed. 9/14/21   Leonidas Boyd MD   dapagliflozin Propanediol (Farxiga) 10 MG tablet Take 10 mg by mouth Daily. 6/21/22   Maria Elena Mullins APRN   escitalopram (LEXAPRO) 20 MG tablet Take 1 tablet by mouth Daily. 8/29/22   Maria Elena Mullins APRN   ferrous sulfate 325 (65 FE) MG tablet Take 1 tablet by mouth Daily. 2/21/22   Maria Elena Mullins APRN   furosemide (LASIX) 20 MG tablet TAKE ONE TABLET BY MOUTH DAILY 6/1/22   Maria Elena Mullins APRN   HYDROcodone-acetaminophen (NORCO) 7.5-325 MG per tablet Take 1 tablet by mouth Every 6 (Six) Hours As Needed.    Leonidas Boyd MD   hydrOXYzine (ATARAX) 50 MG tablet Take 1 tablet by mouth 3 (Three) Times a Day As Needed for Itching. 2/21/22   Maria Elena Mullins APRN   melatonin 1 MG tablet Take 1 tablet by mouth.    Leonidas Boyd MD   Pediatric  "Multivitamins-Iron (Flintstones Complete) 18 MG chewable tablet chewable tablet [place] multi vitamin one oral one daily.   Active    Provider, MD Leonidas   simvastatin (ZOCOR) 40 MG tablet TAKE ONE TABLET BY MOUTH ONCE NIGHTLY 7/11/22   Maria Elena Mullins APRN   traZODone (DESYREL) 100 MG tablet Take 1 tablet by mouth Every Night. 2/21/22   Maria Elena Mullins APRN        Social History:   Social History     Tobacco Use   • Smoking status: Never Smoker   • Smokeless tobacco: Never Used   Substance Use Topics   • Alcohol use: Yes     Comment: HOLIDAYS AND VACATION   • Drug use: No     Recent travel: no     Review of Systems:  Review of Systems   Constitutional: Positive for fever. Negative for chills.   HENT: Negative for congestion, rhinorrhea and sore throat.    Eyes: Negative for pain and visual disturbance.   Respiratory: Positive for cough. Negative for apnea, chest tightness and shortness of breath.    Cardiovascular: Negative for chest pain and palpitations.   Gastrointestinal: Positive for diarrhea and nausea. Negative for abdominal pain and vomiting.   Genitourinary: Negative for difficulty urinating and dysuria.   Musculoskeletal: Negative for joint swelling and myalgias.   Skin: Negative for color change.   Neurological: Positive for headaches. Negative for seizures.   Psychiatric/Behavioral: Negative.    All other systems reviewed and are negative.       Physical Exam:  /84   Pulse 93   Temp 98.8 °F (37.1 °C) (Oral)   Resp 18   Ht 152.4 cm (60\")   Wt 87.2 kg (192 lb 3.9 oz)   SpO2 98%   BMI 37.54 kg/m²     Physical Exam  Vitals and nursing note reviewed.   Constitutional:       General: She is not in acute distress.     Appearance: Normal appearance. She is not toxic-appearing.   HENT:      Head: Normocephalic and atraumatic.      Jaw: There is normal jaw occlusion.   Eyes:      General: Lids are normal.      Extraocular Movements: Extraocular movements intact.      Conjunctiva/sclera: " Conjunctivae normal.      Pupils: Pupils are equal, round, and reactive to light.   Cardiovascular:      Rate and Rhythm: Normal rate and regular rhythm.      Pulses: Normal pulses.      Heart sounds: Normal heart sounds.   Pulmonary:      Effort: Pulmonary effort is normal. No respiratory distress.      Breath sounds: Normal breath sounds. No wheezing or rhonchi.   Abdominal:      General: Abdomen is flat.      Palpations: Abdomen is soft.      Tenderness: There is generalized abdominal tenderness. There is no guarding or rebound.   Musculoskeletal:         General: Normal range of motion.      Cervical back: Normal range of motion and neck supple.      Right lower leg: No edema.      Left lower leg: No edema.   Skin:     General: Skin is warm and dry.   Neurological:      Mental Status: She is alert and oriented to person, place, and time. Mental status is at baseline.   Psychiatric:         Mood and Affect: Mood normal.                Medications in the Emergency Department:  Medications   sodium chloride 0.9 % bolus 1,000 mL (0 mL Intravenous Stopped 9/13/22 2015)   ondansetron (ZOFRAN) injection 4 mg (4 mg Intravenous Given 9/13/22 1946)        Labs  Lab Results (last 24 hours)     ** No results found for the last 24 hours. **           Imaging:  No Radiology Exams Resulted Within Past 24 Hours    Procedures:  Procedures    Progress  ED Course as of 09/17/22 0635   Tue Sep 13, 2022   2219 EKG normal sinus rhythm heart rate 98  QRS 99. [LQ]      ED Course User Index  [LQ] Cary Arzola MD                            Medical Decision Making:  MDM  Number of Diagnoses or Management Options  Cough  Myalgia  Diagnosis management comments: ddx: Renal failure, bacteremia, COVID, influenza, electrolyte abnormality, pneumonia, C. difficile    UA negative for infection.  Patient hemodynamically stable.  Influenza COVID and strep negative.  Patient has a long history of CKD and creatinine is at her baseline 2.5.   Patient does have a significant leukocytosis at 28.  Patient given fluids.  CT of the abdomen showed no stones or signs of hydronephrosis or bowel obstruction.  She had mild pain on examination.  Chest x-ray unremarkable.  Patient states she was feeling better and is comfortable with going home.  We did obtain a stool sample but still do not have back her C. difficile.  I discussed with her that we would call her with these results as it is possible she may have C. difficile.  We also discussed return precautions and follow-up.  The patient is resting comfortably and feels better, is alert, talkative and in no distress.  The repeat examination is unremarkable and benign.  Patient is neurologically intact.  The patient has no shortness of breath or respiratory distress.  Vital signs are stable.  The patient's condition is stable and appropriate for discharge.  The patient will pursue further outpatient evaluation with the primary care physician or other designated or consulting physicians as indicated in the discharge instructions.       Amount and/or Complexity of Data Reviewed  Clinical lab tests: reviewed  Tests in the radiology section of CPT®: reviewed  Decide to obtain previous medical records or to obtain history from someone other than the patient: yes  Review and summarize past medical records: yes  Independent visualization of images, tracings, or specimens: yes         Final diagnoses:   Cough   Myalgia        Disposition:  ED Disposition     ED Disposition   Discharge    Condition   Stable    Comment   --             This medical record created using voice recognition software.             Darcy Hernández  09/13/22 5035       Cary Arzola MD  09/17/22 9277

## 2022-09-14 ENCOUNTER — TELEPHONE (OUTPATIENT)
Dept: EMERGENCY DEPT | Facility: HOSPITAL | Age: 67
End: 2022-09-14

## 2022-09-14 LAB
C COLI+JEJ+UPSA DNA STL QL NAA+NON-PROBE: NOT DETECTED
EC STX1+STX2 GENES STL QL NAA+NON-PROBE: NOT DETECTED
S ENT+BONG DNA STL QL NAA+NON-PROBE: NOT DETECTED
SHIGELLA SP+EIEC IPAH ST NAA+NON-PROBE: NOT DETECTED

## 2022-09-14 NOTE — DISCHARGE INSTRUCTIONS
Please follow-up with your primary care doctor.  Return to the emergency department if you have difficulty breathing, chest pain, or other concerning symptoms.

## 2022-09-15 ENCOUNTER — TELEPHONE (OUTPATIENT)
Dept: EMERGENCY DEPT | Facility: HOSPITAL | Age: 67
End: 2022-09-15

## 2022-09-15 LAB — BACTERIA SPEC AEROBE CULT: NO GROWTH

## 2022-09-15 RX ORDER — VANCOMYCIN HYDROCHLORIDE 125 MG/1
125 CAPSULE ORAL 4 TIMES DAILY
Qty: 40 CAPSULE | Refills: 0 | Status: SHIPPED | OUTPATIENT
Start: 2022-09-15 | End: 2022-09-25

## 2022-09-25 DIAGNOSIS — E78.5 HYPERLIPIDEMIA, UNSPECIFIED HYPERLIPIDEMIA TYPE: ICD-10-CM

## 2022-09-29 RX ORDER — FERROUS SULFATE 325(65) MG
TABLET ORAL
Qty: 90 TABLET | Refills: 0 | Status: SHIPPED | OUTPATIENT
Start: 2022-09-29 | End: 2023-01-03 | Stop reason: SDUPTHER

## 2022-09-29 RX ORDER — HYDROXYZINE 50 MG/1
TABLET, FILM COATED ORAL
Qty: 270 TABLET | Refills: 0 | Status: SHIPPED | OUTPATIENT
Start: 2022-09-29 | End: 2023-01-03 | Stop reason: SDUPTHER

## 2022-09-29 RX ORDER — SIMVASTATIN 40 MG
TABLET ORAL
Qty: 30 TABLET | Refills: 2 | Status: SHIPPED | OUTPATIENT
Start: 2022-09-29 | End: 2022-11-02

## 2022-11-02 RX ORDER — SIMVASTATIN 40 MG
40 TABLET ORAL NIGHTLY
Qty: 90 TABLET | Refills: 1 | Status: SHIPPED | OUTPATIENT
Start: 2022-11-02 | End: 2023-01-03 | Stop reason: SDUPTHER

## 2022-11-28 RX ORDER — FUROSEMIDE 20 MG/1
TABLET ORAL
Qty: 90 TABLET | Refills: 1 | Status: SHIPPED | OUTPATIENT
Start: 2022-11-28

## 2022-11-28 RX ORDER — CARVEDILOL 3.12 MG/1
TABLET ORAL
Qty: 180 TABLET | Refills: 1 | Status: SHIPPED | OUTPATIENT
Start: 2022-11-28

## 2022-11-28 NOTE — TELEPHONE ENCOUNTER
Encounter Date: 9/12/2019       History     Chief Complaint   Patient presents with    Foot Pain     Reports was [place] on [place] when her L ankle twisted under her. Bruising noted to lateral foot with some swelling.      69-year-old female with history of hypertension, GERD, arthritis, obesity, presents to the ER with chief complaint of left foot pain since an injury 4 days ago.  Patient reports twisting her left ankle while walking.  She reports continued bruising and swelling to the side of her left foot since the injury.  She is using a rolling walker, but has been putting weight on the foot.  This increases her pain.  Her pain is rated 5/10, described as aching.  Patient was taking ibuprofen for her pain, but discontinued this 3 days ago due to shoulder procedure scheduled today.  Patient presented for her procedure today, but was advised to come to the ER to evaluate her foot pain 1st.  She denies additional injuries, weakness, numbness or tingling of the extremities, or any other complaints at this time.        Review of patient's allergies indicates:  No Known Allergies  Past Medical History:   Diagnosis Date    Allergy     Arthritis     Chronic lower back pain     Depression     Fever blister     GERD (gastroesophageal reflux disease)     Hemorrhoids, internal 11/5/2014    Hyperlipidemia     Hypertension     Joint pain     Morbid obesity with BMI of 40.0-44.9, adult 2/24/2015    Multiple gastric ulcers 12/14/2017    TIMMY (obstructive sleep apnea)     does not use CPAP    Skin cancer      Past Surgical History:   Procedure Laterality Date    ANTERIOR CERVICAL CORPECTOMY W/ FUSION  1988    ARTHROPLASTY-HIP Right 4/28/2016    [facility]    ARTHROPLASTY-KNEE Right 3/14/2018    Performed by Y[facility]ier Combs [facility] at [facility] OR    BILATERAL SHOULDER ARTICULAR BRANCH BLOCK Bilateral 8/1/2019    Performed by Lydia Velásquez Jr., MD at [facility] PAIN MGT    COLONOSCOPY N/A  Please advise refills  Labs done 9/13/2022, last seen 6/21/2022   11/2/2016    [facility] ENDO    COLONOSCOPY N/A 11/5/2014    Per[facility] by Pastora Olvera [facility] at [facility] ENDO    ESOPHAGOGASTRODUODENOSCOPY (EGD) N/A 12/14/2017    Performed by El [facility]itt [facility] at [facility] ENDO    ESOPHAGOGASTRODUODENOSCOPY (EGD) N/A 10/20/2017    Performed by El [facility] at [facility] ENDO    Injection-steroid-epidural-caudal N/A 1/10/2019    [facility]    Injection-steroid-epidural-lumbar-L2-3 (LEFT > RIGHT) N/A 11/8/2018    [facility] at [facility]    KNEE ARTHROSCOPY W/ MENISCAL REPAIR Right 2012    LUMBAR LAMINECTOMY  06/19/2015    SKIN CANCER EXCISION      forehead does not recall date    TOTAL HIP ARTHROPLASTY Right 04/28/2016         TUBAL LIGATION  1980     Family History   Problem Relation Age of Onset    Arthritis Mother     Cancer Mother         breast ca    Breast cancer Mother     Kidney disease Father     Alzheimer's disease Maternal Grandfather     Cancer Paternal Grandmother         colon ca    Colon cancer Paternal Grandmother      Social History     Tobacco Use    Smoking status: Never Smoker    Smokeless tobacco: Never Used   Substance Use Topics    Alcohol use: No     Alcohol/week: 0.0 oz    Drug use: No     Review of Systems   Constitutional: Negative for chills and fever.   HENT: Negative for sore throat.    Respiratory: Negative for shortness of breath.    Cardiovascular: Negative for chest pain.   Gastrointestinal: Negative for abdominal pain, nausea and vomiting.   Genitourinary: Negative for dysuria.   Musculoskeletal: Positive for arthralgias and joint swelling. Negative for back pain.   Skin: Positive for color change (bruising). Negative for rash and wound.   Neurological: Negative for weakness.   Hematological: Does not bruise/bleed easily.       Physical Exam     Initial Vitals [09/12/19 0926]   BP Pulse Resp Temp SpO2   124/65  76 19 98.5 °F (36.9 °C) 96 %      MAP       --         Physical Exam    Nursing note and vitals reviewed.  Constitutional: She appears well-developed and well-nourished.   HENT:   Head: Atraumatic.   Mouth/Throat: Oropharynx is clear and moist.   Eyes: Conjunctivae and EOM are normal. Pupils are equal, round, and reactive to light.   Neck: Normal range of motion. Neck supple.   Cardiovascular: Normal rate and regular rhythm.   Pulmonary/Chest: Breath sounds normal. No respiratory distress. She has no wheezes. She has no rhonchi. She has no rales.   Abdominal: Soft. Bowel sounds are normal. There is no tenderness.   Musculoskeletal:        Left ankle: She exhibits normal range of motion and no ecchymosis. No tenderness. No lateral malleolus, no medial malleolus and no proximal fibula tenderness found. Achilles tendon normal. Achilles tendon exhibits no pain and no defect.        Left foot: There is decreased range of motion, tenderness and swelling (with bruising to proximal lateral foot.  bruising extends to distal foot. ).        Feet:    Neurological: She is alert and oriented to person, place, and time.   Skin: Capillary refill takes less than 2 seconds. No rash noted.   Psychiatric: She has a normal mood and affect.         ED Course   Procedures  Labs Reviewed - No data to display       Imaging Results          X-Ray Foot Complete Left (Final result)  Result time 09/12/19 10:59:21    Final result by Vamshi Delarosa MD (09/12/19 10:59:21)                 Impression:      Fifth metatarsal fracture.      Electronically signed by: Vamshi Delarosa MD  Date:    09/12/2019  Time:    10:59             Narrative:    EXAMINATION:  XR FOOT COMPLETE 3 VIEW LEFT    CLINICAL HISTORY:  .  Unspecified injury of left foot, initial encounter    TECHNIQUE:  AP, lateral and oblique views of the left foot were performed.    COMPARISON:  None    FINDINGS:  Mildly displaced fracture to the base of the 5th metatarsal noted.  Prominent  midfoot degenerative changes present.  The alignment is within normal limits.  No marrow replacement process.  Mild enthesopathy at the plantar fascia origin.                               X-Ray Ankle Complete Left (Final result)  Result time 09/12/19 11:00:21    Final result by Vamshi Delarosa MD (09/12/19 11:00:21)                 Impression:      Fifth metatarsal fracture.      Electronically signed by: Vamshi Delarosa MD  Date:    09/12/2019  Time:    11:00             Narrative:    EXAMINATION:  XR ANKLE COMPLETE 3 VIEW LEFT    CLINICAL HISTORY:  Pain in left ankle and joints of left foot    TECHNIQUE:  AP, lateral and oblique views of the left ankle were performed.    COMPARISON:  None    FINDINGS:  Dystrophic calcification distal to the medial malleolus.  Alignment is within normal limits.  No marrow replacement process.  Mildly displaced 5th metatarsal fracture noted.                                       APC / Resident Notes:   Patient presents to the ER with chief complaint of left foot pain x5 days since twisting the foot while walking.  X-ray reveals fracture to the base of the 5th metatarsal, there is no significant displacement.  I reviewed imaging and patient's presentation with my supervising physician.  Will place the patient in walking boot and advised on R.I.C.E therapy and follow up with Orthopedics within 1 week.  Will give prescription for NSAIDs and advised use of Tylenol for pain.  Patient is comfortable with this plan.  She is given ER return precautions.         Attending Attestation:     Physician Attestation Statement for NP/PA:   I discussed this assessment and plan of this patient with the NP/PA, but I did not personally examine the patient. The face to face encounter was performed by the NP/PA.                  ED Course as of Sep 12 1935   Thu Sep 12, 2019   1113 X-Ray Foot Complete Left [LH]      ED Course User Index  [LH] ROBINSON Leger     Clinical Impression:       ICD-10-CM  ICD-9-CM   1. Left ankle pain M25.572 719.47   2. Injury of left foot S99.922A 959.7   3. Closed displaced fracture of fifth metatarsal bone of left foot, initial encounter S92.352A 825.25                                ROBINSON Leger  09/12/19 1935       Jarod Hernandez MD  09/13/19 1658

## 2022-12-20 ENCOUNTER — OFFICE VISIT (OUTPATIENT)
Dept: INTERNAL MEDICINE | Facility: CLINIC | Age: 67
End: 2022-12-20

## 2022-12-20 VITALS
HEART RATE: 83 BPM | DIASTOLIC BLOOD PRESSURE: 68 MMHG | HEIGHT: 60 IN | BODY MASS INDEX: 38.64 KG/M2 | SYSTOLIC BLOOD PRESSURE: 130 MMHG | OXYGEN SATURATION: 100 % | TEMPERATURE: 97.6 F | WEIGHT: 196.8 LBS

## 2022-12-20 DIAGNOSIS — E61.1 IRON DEFICIENCY: ICD-10-CM

## 2022-12-20 DIAGNOSIS — E05.90 HYPERTHYROIDISM: ICD-10-CM

## 2022-12-20 DIAGNOSIS — N18.4 STAGE 4 CHRONIC KIDNEY DISEASE: ICD-10-CM

## 2022-12-20 DIAGNOSIS — J01.10 ACUTE NON-RECURRENT FRONTAL SINUSITIS: ICD-10-CM

## 2022-12-20 DIAGNOSIS — Z12.31 VISIT FOR SCREENING MAMMOGRAM: ICD-10-CM

## 2022-12-20 DIAGNOSIS — G47.00 INSOMNIA, UNSPECIFIED TYPE: ICD-10-CM

## 2022-12-20 DIAGNOSIS — Z00.00 MEDICARE ANNUAL WELLNESS VISIT, SUBSEQUENT: Primary | ICD-10-CM

## 2022-12-20 DIAGNOSIS — D72.829 LEUKOCYTOSIS, UNSPECIFIED TYPE: ICD-10-CM

## 2022-12-20 DIAGNOSIS — F33.0 MAJOR DEPRESSIVE DISORDER, RECURRENT, MILD: ICD-10-CM

## 2022-12-20 DIAGNOSIS — E78.5 HYPERLIPIDEMIA, UNSPECIFIED HYPERLIPIDEMIA TYPE: ICD-10-CM

## 2022-12-20 DIAGNOSIS — Z23 NEED FOR INFLUENZA VACCINATION: ICD-10-CM

## 2022-12-20 DIAGNOSIS — K21.9 GASTROESOPHAGEAL REFLUX DISEASE WITHOUT ESOPHAGITIS: ICD-10-CM

## 2022-12-20 DIAGNOSIS — Z78.0 POSTMENOPAUSAL: ICD-10-CM

## 2022-12-20 DIAGNOSIS — E04.1 THYROID NODULE: ICD-10-CM

## 2022-12-20 DIAGNOSIS — M25.50 MULTIPLE JOINT PAIN: ICD-10-CM

## 2022-12-20 DIAGNOSIS — E55.9 VITAMIN D DEFICIENCY: ICD-10-CM

## 2022-12-20 DIAGNOSIS — F41.9 ANXIETY: ICD-10-CM

## 2022-12-20 DIAGNOSIS — I10 ESSENTIAL HYPERTENSION: ICD-10-CM

## 2022-12-20 PROBLEM — N28.9 KIDNEY DISEASE: Status: ACTIVE | Noted: 2022-12-20

## 2022-12-20 PROBLEM — D64.9 ANEMIA: Status: ACTIVE | Noted: 2022-04-28

## 2022-12-20 PROBLEM — Z98.84 BARIATRIC SURGERY STATUS: Status: ACTIVE | Noted: 2019-11-13

## 2022-12-20 LAB
25(OH)D3 SERPL-MCNC: 90.5 NG/ML (ref 30–100)
ALBUMIN SERPL-MCNC: 3.9 G/DL (ref 3.5–5.2)
ALBUMIN/GLOB SERPL: 1.3 G/DL
ALP SERPL-CCNC: 94 U/L (ref 39–117)
ALT SERPL W P-5'-P-CCNC: 19 U/L (ref 1–33)
ANION GAP SERPL CALCULATED.3IONS-SCNC: 8 MMOL/L (ref 5–15)
AST SERPL-CCNC: 25 U/L (ref 1–32)
BASOPHILS # BLD AUTO: 0.06 10*3/MM3 (ref 0–0.2)
BASOPHILS NFR BLD AUTO: 0.7 % (ref 0–1.5)
BILIRUB SERPL-MCNC: 0.3 MG/DL (ref 0–1.2)
BUN SERPL-MCNC: 40 MG/DL (ref 8–23)
BUN/CREAT SERPL: 17.2 (ref 7–25)
CALCIUM SPEC-SCNC: 9.2 MG/DL (ref 8.6–10.5)
CHLORIDE SERPL-SCNC: 108 MMOL/L (ref 98–107)
CHOLEST SERPL-MCNC: 174 MG/DL (ref 0–200)
CO2 SERPL-SCNC: 26 MMOL/L (ref 22–29)
CREAT SERPL-MCNC: 2.32 MG/DL (ref 0.57–1)
DEPRECATED RDW RBC AUTO: 44.5 FL (ref 37–54)
EGFRCR SERPLBLD CKD-EPI 2021: 22.5 ML/MIN/1.73
EOSINOPHIL # BLD AUTO: 0.24 10*3/MM3 (ref 0–0.4)
EOSINOPHIL NFR BLD AUTO: 2.9 % (ref 0.3–6.2)
ERYTHROCYTE [DISTWIDTH] IN BLOOD BY AUTOMATED COUNT: 13 % (ref 12.3–15.4)
FERRITIN SERPL-MCNC: 585 NG/ML (ref 13–150)
GLOBULIN UR ELPH-MCNC: 2.9 GM/DL
GLUCOSE SERPL-MCNC: 74 MG/DL (ref 65–99)
HCT VFR BLD AUTO: 35.6 % (ref 34–46.6)
HDLC SERPL-MCNC: 78 MG/DL (ref 40–60)
HGB BLD-MCNC: 11.4 G/DL (ref 12–15.9)
IMM GRANULOCYTES # BLD AUTO: 0.03 10*3/MM3 (ref 0–0.05)
IMM GRANULOCYTES NFR BLD AUTO: 0.4 % (ref 0–0.5)
IRON 24H UR-MRATE: 87 MCG/DL (ref 37–145)
IRON SATN MFR SERPL: 28 % (ref 20–50)
LDLC SERPL CALC-MCNC: 82 MG/DL (ref 0–100)
LDLC/HDLC SERPL: 1.04 {RATIO}
LYMPHOCYTES # BLD AUTO: 2.88 10*3/MM3 (ref 0.7–3.1)
LYMPHOCYTES NFR BLD AUTO: 35.1 % (ref 19.6–45.3)
MCH RBC QN AUTO: 30 PG (ref 26.6–33)
MCHC RBC AUTO-ENTMCNC: 32 G/DL (ref 31.5–35.7)
MCV RBC AUTO: 93.7 FL (ref 79–97)
MONOCYTES # BLD AUTO: 0.82 10*3/MM3 (ref 0.1–0.9)
MONOCYTES NFR BLD AUTO: 10 % (ref 5–12)
NEUTROPHILS NFR BLD AUTO: 4.18 10*3/MM3 (ref 1.7–7)
NEUTROPHILS NFR BLD AUTO: 50.9 % (ref 42.7–76)
NRBC BLD AUTO-RTO: 0 /100 WBC (ref 0–0.2)
PLATELET # BLD AUTO: 233 10*3/MM3 (ref 140–450)
PMV BLD AUTO: 11.3 FL (ref 6–12)
POTASSIUM SERPL-SCNC: 4.7 MMOL/L (ref 3.5–5.2)
PROT SERPL-MCNC: 6.8 G/DL (ref 6–8.5)
RBC # BLD AUTO: 3.8 10*6/MM3 (ref 3.77–5.28)
SODIUM SERPL-SCNC: 142 MMOL/L (ref 136–145)
T4 FREE SERPL-MCNC: 0.87 NG/DL (ref 0.93–1.7)
TIBC SERPL-MCNC: 314 MCG/DL (ref 298–536)
TRANSFERRIN SERPL-MCNC: 211 MG/DL (ref 200–360)
TRIGL SERPL-MCNC: 74 MG/DL (ref 0–150)
TSH SERPL DL<=0.05 MIU/L-ACNC: 1.5 UIU/ML (ref 0.27–4.2)
VLDLC SERPL-MCNC: 14 MG/DL (ref 5–40)
WBC NRBC COR # BLD: 8.21 10*3/MM3 (ref 3.4–10.8)

## 2022-12-20 PROCEDURE — 84466 ASSAY OF TRANSFERRIN: CPT | Performed by: NURSE PRACTITIONER

## 2022-12-20 PROCEDURE — 84439 ASSAY OF FREE THYROXINE: CPT | Performed by: NURSE PRACTITIONER

## 2022-12-20 PROCEDURE — 1170F FXNL STATUS ASSESSED: CPT | Performed by: NURSE PRACTITIONER

## 2022-12-20 PROCEDURE — G0008 ADMIN INFLUENZA VIRUS VAC: HCPCS | Performed by: NURSE PRACTITIONER

## 2022-12-20 PROCEDURE — 83540 ASSAY OF IRON: CPT | Performed by: NURSE PRACTITIONER

## 2022-12-20 PROCEDURE — 82306 VITAMIN D 25 HYDROXY: CPT | Performed by: NURSE PRACTITIONER

## 2022-12-20 PROCEDURE — 99214 OFFICE O/P EST MOD 30 MIN: CPT | Performed by: NURSE PRACTITIONER

## 2022-12-20 PROCEDURE — 90662 IIV NO PRSV INCREASED AG IM: CPT | Performed by: NURSE PRACTITIONER

## 2022-12-20 PROCEDURE — 1159F MED LIST DOCD IN RCRD: CPT | Performed by: NURSE PRACTITIONER

## 2022-12-20 PROCEDURE — 80053 COMPREHEN METABOLIC PANEL: CPT | Performed by: NURSE PRACTITIONER

## 2022-12-20 PROCEDURE — 36415 COLL VENOUS BLD VENIPUNCTURE: CPT | Performed by: NURSE PRACTITIONER

## 2022-12-20 PROCEDURE — 80061 LIPID PANEL: CPT | Performed by: NURSE PRACTITIONER

## 2022-12-20 PROCEDURE — 82728 ASSAY OF FERRITIN: CPT | Performed by: NURSE PRACTITIONER

## 2022-12-20 PROCEDURE — 84443 ASSAY THYROID STIM HORMONE: CPT | Performed by: NURSE PRACTITIONER

## 2022-12-20 PROCEDURE — 85025 COMPLETE CBC W/AUTO DIFF WBC: CPT | Performed by: NURSE PRACTITIONER

## 2022-12-20 PROCEDURE — G0402 INITIAL PREVENTIVE EXAM: HCPCS | Performed by: NURSE PRACTITIONER

## 2022-12-20 RX ORDER — BUSPIRONE HYDROCHLORIDE 5 MG/1
5 TABLET ORAL 3 TIMES DAILY PRN
Qty: 90 TABLET | Refills: 1 | Status: SHIPPED | OUTPATIENT
Start: 2022-12-20 | End: 2023-01-20

## 2022-12-20 RX ORDER — AMOXICILLIN AND CLAVULANATE POTASSIUM 500; 125 MG/1; MG/1
1 TABLET, FILM COATED ORAL 2 TIMES DAILY
Qty: 20 TABLET | Refills: 0 | Status: SHIPPED | OUTPATIENT
Start: 2022-12-20 | End: 2022-12-30

## 2022-12-20 NOTE — ASSESSMENT & PLAN NOTE
May consider restarting PPI if breakthrough symptoms worsen or persist, patient to call clinic if she would like to proceed with medication management.

## 2022-12-20 NOTE — ASSESSMENT & PLAN NOTE
Augmentin to pharmacy at renal dosage, patient to call or return to clinic if symptoms worsen or persist.

## 2022-12-20 NOTE — ASSESSMENT & PLAN NOTE
Patient without medication management x years. Repeat thyroid labs today, will determine if intervention is warranted based on results.

## 2022-12-20 NOTE — PROGRESS NOTES
"The ABCs of the Annual Wellness Visit  Subsequent Medicare Wellness Visit    Subjective    {Wrapup  Review (Popup)  Advance Care Planning  Labs  CC  Problem List  Visit Diagnosis  Medications  Result Review  Imaging  [facility]  [facility]  [facility]  [employer]  Encounters  Notes  Media  Procedures :23}  Ro Adorno is a 67 y.o. female who presents for a Subsequent Medicare Wellness Visit.    The following portions of the patient's history were reviewed and   updated as appropriate: {history reviewed:20406::\"allergies\",\"current medications\",\"past family history\",\"past medical history\",\"past social history\",\"past surgical history\",\"problem list\"}.    Compared to one year ago, the patient feels her physical   health is worse    Compared to one year ago, the patient feels her mental   health is worse    Recent Hospitalizations:  {Hospital Admission Status in the last 365 days:92767}      Current Medical Providers:  Patient Care Team:  Maria Elena Mullins APRN as PCP - General (Nurse Practitioner)  John Baum MD as Consulting Physician (Nephrology)    Outpatient Medications Prior to Visit   Medication Sig Dispense Refill   • B Complex-Biotin-FA (, BIOTIN, PO) Take 1 tablet by mouth Daily.     • calcium carbonate (TUMS) 500 MG chewable tablet Chew 1 tablet Daily.     • carvedilol (COREG) 3.125 MG tablet TAKE ONE TABLET BY MOUTH TWICE A DAY WITH MEALS 180 tablet 1   • [employer] FIBER GUMMIES PO Take  by mouth.     • cyclobenzaprine (FLEXERIL) 10 MG tablet Take 10 mg by mouth 3 (Three) Times a Day As Needed.     • escitalopram (LEXAPRO) 20 MG tablet Take 1 tablet by mouth Daily. 90 tablet 1   • FeroSul 325 (65 Fe) MG tablet TAKE ONE TABLET BY MOUTH DAILY 90 tablet 0   • furosemide (LASIX) 20 MG tablet TAKE ONE TABLET BY MOUTH DAILY 90 tablet 1   • HYDROcodone-acetaminophen (NORCO) 7.5-325 MG per tablet Take 1 tablet by mouth Every 6 (Six) Hours As Needed.     • hydrOXYzine " (ATARAX) 50 MG tablet TAKE ONE TABLET BY MOUTH THREE TIMES A DAY AS NEEDED FOR ITCHING 270 tablet 0   • melatonin 1 MG tablet Take 1 tablet by mouth.     • Pediatric Multivitamins-Iron (Flintstones Complete) 18 MG chewable tablet chewable tablet [place] multi vitamin one oral one daily.   Active     • simvastatin (Zocor) 40 MG tablet Take 1 tablet by mouth Every Night. 90 tablet 1   • traZODone (DESYREL) 100 MG tablet Take 1 tablet by mouth Every Night. 90 tablet 1   • albuterol (PROVENTIL) (2.5 MG/3ML) 0.083% nebulizer solution Take 2.5 mg by nebulization Every 4 (Four) Hours As Needed for Wheezing. 30 each 1   • dapagliflozin Propanediol (Farxiga) 10 MG tablet Take 10 mg by mouth Daily. 90 tablet 1     No facility-administered medications prior to visit.       Opioid medication/s are on active medication list.  and I have evaluated her active treatment plan and pain score trends (see table).  There were no vitals filed for this visit.  I have reviewed the chart for potential of high risk medication and harmful drug interactions in the elderly.            Aspirin is not on active medication list.  {ASPIRIN NOT ON MEDICATION LIST INDICATED/NOT INDICATED:99773}.    Patient Active Problem List   Diagnosis   • Chronic fatigue   • Ankle swelling   • Essential hypertension   • Hyperlipidemia   • SEVEN (obstructive sleep apnea)   • Gastroesophageal reflux disease without esophagitis   • Multiple joint pain   • Anxiety   • Stage 4 chronic kidney disease (HCC)   • Other kyphoscoliosis and scoliosis   • Vitamin D deficiency   • Secondary hyperparathyroidism (HCC)   • Dietary counseling   • S/P laparoscopic sleeve gastrectomy   • Obesity, Class I, BMI 30-34.9   • Major depressive disorder, recurrent, mild (HCC)   • Iron deficiency   • History of COVID-19   • Hyperthyroidism   • Insomnia   • Urinary frequency   • Anemia   • Backache   • Bariatric surgery status   • Kidney disease   • Osteoarthrosis, unspecified whether  "generalized or localized, unspecified site     Advance Care Planning  Advance Directive is not on file.  {ACP Discussion, Advance Directive not in EMR:35964}     Objective    Vitals:    12/20/22 1142   BP: 130/68   BP Location: Left arm   Patient Position: Sitting   Cuff Size: Large Adult   Pulse: 83   Temp: 97.6 °F (36.4 °C)   TempSrc: Temporal   SpO2: 100%   Weight: 89.3 kg (196 lb 12.8 oz)   Height: 152.4 cm (60\")     Estimated body mass index is 38.43 kg/m² as calculated from the following:    Height as of this encounter: 152.4 cm (60\").    Weight as of this encounter: 89.3 kg (196 lb 12.8 oz).    {Class 2 Severe Obesity (BMI >=35 and <=39.9.:7230415906}      Does the patient have evidence of cognitive impairment?   {Yes/No:38742}            HEALTH RISK ASSESSMENT    Smoking Status:  Social History     Tobacco Use   Smoking Status Never   Smokeless Tobacco Never     Alcohol Consumption:  Social History     Substance and Sexual Activity   Alcohol Use Yes    Comment: HOLIDAYS AND VACATION     Fall Risk Screen:    STEADI Fall Risk Assessment was completed, and patient is at HIGH risk for falls. Assessment completed on:12/20/2022    Depression Screening:  PHQ-2/PHQ-9 Depression Screening 12/20/2022   Retired PHQ-9 Total Score -   Retired Total Score -   Little Interest or Pleasure in Doing Things 0-->not at all   Feeling Down, Depressed or Hopeless 0-->not at all   PHQ-9: Brief Depression Severity Measure Score 0       Health Habits and Functional and Cognitive Screening:  Functional & Cognitive Status 12/20/2022   Do you have difficulty preparing food and eating? No   Do you have difficulty bathing yourself, getting dressed or grooming yourself? No   Do you have difficulty using the toilet? No   Do you have difficulty moving around from place to place? No   Do you have trouble with steps or getting out of a bed or a chair? Yes   Current Diet Well Balanced Diet   Dental Exam Not up to date   Eye Exam Not up to date "   Exercise (times per week) 1 times per week   Current Exercises Include Treadmill        Exercise Comment -   Do you need help using the phone?  No   Are you deaf or do you have serious difficulty hearing?  No   Do you need help with transportation? Yes   Do you need help shopping? No   Do you need help preparing meals?  No   Do you need help with housework?  Yes   Do you need help with laundry? No   Do you need help taking your medications? No   Do you need help managing money? No   Do you ever drive or ride in a car without wearing a seat belt? Yes   Have you felt unusual stress, anger or loneliness in the last month? -   Who do you live with? -   If you need help, do you have trouble finding someone available to you? -   Do you have difficulty concentrating, remembering or making decisions? -       Age-appropriate Screening Schedule:  Refer to the list below for future screening recommendations based on patient's age, sex and/or medical conditions. Orders for these recommended tests are listed in the plan section. The patient has been provided with a written plan.    Health Maintenance   Topic Date Due   • ZOSTER VACCINE (1 of 2) Never done   • INFLUENZA VACCINE  08/01/2022   • PAP SMEAR  09/25/2022   • DXA SCAN  11/25/2022   • LIPID PANEL  08/26/2023   • MAMMOGRAM  01/05/2024   • TDAP/TD VACCINES (2 - Td or Tdap) 12/29/2027                CMS Preventative Services Quick Reference  Risk Factors Identified During Encounter:    {Medicare Wellness Risk Factors:67194}    The above risks/problems have been discussed with the patient.  Pertinent information has been shared with the patient in the After Visit Summary.    There are no diagnoses linked to this encounter.    Follow Up:   Next Medicare Wellness visit to be scheduled in 1 year.      An After Visit Summary and PPPS were made available to the patient.

## 2022-12-20 NOTE — ASSESSMENT & PLAN NOTE
Continue to follow with pain management for prescription of Norco and muscle relaxer, will continue to monitor.

## 2022-12-20 NOTE — PROGRESS NOTES
The ABCs of the Annual Wellness Visit  Subsequent Medicare Wellness Visit    Subjective    Ro Adorno is a 67 y.o. female who presents for a Subsequent Medicare Wellness Visit.    The following portions of the patient's history were reviewed and   updated as appropriate: allergies, current medications, past family history, past medical history, past social history, past surgical history and problem list.    Compared to one year ago, the patient feels her physical   health is worse.    Compared to one year ago, the patient feels her mental   health is worse.    Recent Hospitalizations:  She was not admitted to the hospital during the last year.       Current Medical Providers:  Patient Care Team:  Maria Elena Mullins APRN as PCP - General (Nurse Practitioner)  John Baum MD as Consulting Physician (Nephrology)    Outpatient Medications Prior to Visit   Medication Sig Dispense Refill   • B Complex-Biotin-FA (, BIOTIN, PO) Take 1 tablet by mouth Daily.     • calcium carbonate (TUMS) 500 MG chewable tablet Chew 1 tablet Daily.     • carvedilol (COREG) 3.125 MG tablet TAKE ONE TABLET BY MOUTH TWICE A DAY WITH MEALS 180 tablet 1   • [employer] FIBER GUMMIES PO Take  by mouth.     • cyclobenzaprine (FLEXERIL) 10 MG tablet Take 10 mg by mouth 3 (Three) Times a Day As Needed.     • escitalopram (LEXAPRO) 20 MG tablet Take 1 tablet by mouth Daily. 90 tablet 1   • FeroSul 325 (65 Fe) MG tablet TAKE ONE TABLET BY MOUTH DAILY 90 tablet 0   • furosemide (LASIX) 20 MG tablet TAKE ONE TABLET BY MOUTH DAILY 90 tablet 1   • HYDROcodone-acetaminophen (NORCO) 7.5-325 MG per tablet Take 1 tablet by mouth Every 6 (Six) Hours As Needed.     • hydrOXYzine (ATARAX) 50 MG tablet TAKE ONE TABLET BY MOUTH THREE TIMES A DAY AS NEEDED FOR ITCHING 270 tablet 0   • melatonin 1 MG tablet Take 1 tablet by mouth.     • Pediatric Multivitamins-Iron (Flintstones Complete) 18 MG chewable tablet chewable tablet [place] multi vitamin one  oral one daily.   Active     • simvastatin (Zocor) 40 MG tablet Take 1 tablet by mouth Every Night. 90 tablet 1   • traZODone (DESYREL) 100 MG tablet Take 1 tablet by mouth Every Night. 90 tablet 1   • albuterol (PROVENTIL) (2.5 MG/3ML) 0.083% nebulizer solution Take 2.5 mg by nebulization Every 4 (Four) Hours As Needed for Wheezing. 30 each 1   • dapagliflozin Propanediol (Farxiga) 10 MG tablet Take 10 mg by mouth Daily. 90 tablet 1     No facility-administered medications prior to visit.       Opioid medication/s are on active medication list.  and I have evaluated her active treatment plan and pain score trends (see table).  There were no vitals filed for this visit.  I have reviewed the chart for potential of high risk medication and harmful drug interactions in the elderly.            Aspirin is not on active medication list.  Aspirin use is not indicated based on review of current medical condition/s. Risk of harm outweighs potential benefits.  .    Patient Active Problem List   Diagnosis   • Chronic fatigue   • Ankle swelling   • Essential hypertension   • Hyperlipidemia   • SEVEN (obstructive sleep apnea)   • Gastroesophageal reflux disease without esophagitis   • Multiple joint pain   • Anxiety   • Stage 4 chronic kidney disease (HCC)   • Other kyphoscoliosis and scoliosis   • Vitamin D deficiency   • Secondary hyperparathyroidism (HCC)   • Dietary counseling   • S/P laparoscopic sleeve gastrectomy   • Obesity, Class I, BMI 30-34.9   • Major depressive disorder, recurrent, mild (HCC)   • Iron deficiency   • History of COVID-19   • Hyperthyroidism   • Insomnia   • Urinary frequency   • Anemia   • Backache   • Bariatric surgery status   • Kidney disease   • Osteoarthrosis, unspecified whether generalized or localized, unspecified site   • Thyroid nodule   • Postmenopausal   • Acute non-recurrent frontal sinusitis   • Medicare annual wellness visit, subsequent     Advance Care Planning  Advance Directive is not  "on file.  ACP discussion was held with the patient during this visit. Patient does not have an advance directive, information provided.     Objective    Vitals:    12/20/22 1142   BP: 130/68   BP Location: Left arm   Patient Position: Sitting   Cuff Size: Large Adult   Pulse: 83   Temp: 97.6 °F (36.4 °C)   TempSrc: Temporal   SpO2: 100%   Weight: 89.3 kg (196 lb 12.8 oz)   Height: 152.4 cm (60\")     Estimated body mass index is 38.43 kg/m² as calculated from the following:    Height as of this encounter: 152.4 cm (60\").    Weight as of this encounter: 89.3 kg (196 lb 12.8 oz).    Class 2 Severe Obesity (BMI >=35 and <=39.9). Obesity-related health conditions include the following: obstructive sleep apnea, hypertension and GERD. Obesity is worsening. BMI is is above average; BMI management plan is completed. We discussed portion control and increasing exercise.      Does the patient have evidence of cognitive impairment? No          HEALTH RISK ASSESSMENT    Smoking Status:  Social History     Tobacco Use   Smoking Status Never   Smokeless Tobacco Never     Alcohol Consumption:  Social History     Substance and Sexual Activity   Alcohol Use Yes    Comment: HOLIDAYS AND VACATION     Fall Risk Screen:    BINDU Fall Risk Assessment was completed, and patient is at HIGH risk for falls. Assessment completed on:12/20/2022    Depression Screening:  PHQ-2/PHQ-9 Depression Screening 12/20/2022   Retired PHQ-9 Total Score -   Retired Total Score -   Little Interest or Pleasure in Doing Things 0-->not at all   Feeling Down, Depressed or Hopeless 0-->not at all   PHQ-9: Brief Depression Severity Measure Score 0       Health Habits and Functional and Cognitive Screening:  Functional & Cognitive Status 12/20/2022   Do you have difficulty preparing food and eating? No   Do you have difficulty bathing yourself, getting dressed or grooming yourself? No   Do you have difficulty using the toilet? No   Do you have difficulty moving " around from place to place? No   Do you have trouble with steps or getting out of a bed or a chair? Yes   Current Diet Well Balanced Diet   Dental Exam Not up to date   Eye Exam Not up to date   Exercise (times per week) 1 times per week   Current Exercises Include Treadmill        Exercise Comment -   Do you need help using the phone?  No   Are you deaf or do you have serious difficulty hearing?  No   Do you need help with transportation? Yes   Do you need help shopping? No   Do you need help preparing meals?  No   Do you need help with housework?  Yes   Do you need help with laundry? No   Do you need help taking your medications? No   Do you need help managing money? No   Do you ever drive or ride in a car without wearing a seat belt? Yes   Have you felt unusual stress, anger or loneliness in the last month? -   Who do you live with? -   If you need help, do you have trouble finding someone available to you? -   Do you have difficulty concentrating, remembering or making decisions? -       Age-appropriate Screening Schedule:  Refer to the list below for future screening recommendations based on patient's age, sex and/or medical conditions. Orders for these recommended tests are listed in the plan section. The patient has been provided with a written plan.    Health Maintenance   Topic Date Due   • ZOSTER VACCINE (1 of 2) Never done   • PAP SMEAR  09/25/2022   • DXA SCAN  11/25/2022   • LIPID PANEL  08/26/2023   • MAMMOGRAM  01/05/2024   • TDAP/TD VACCINES (2 - Td or Tdap) 12/29/2027   • INFLUENZA VACCINE  Completed                CMS Preventative Services Quick Reference  Risk Factors Identified During Encounter  Immunizations Discussed/Encouraged: Influenza, Prevnar 20 (Pneumococcal 20-valent conjugate), Shingrix and COVID19  The above risks/problems have been discussed with the patient.  Pertinent information has been shared with the patient in the After Visit Summary.  An After Visit Summary and PPPS were made  available to the patient.    Follow Up:   Next Medicare Wellness visit to be scheduled in 1 year.       Additional E&M Note during same encounter follows:  Patient has multiple medical problems which are significant and separately identifiable that require additional work above and beyond the Medicare Wellness Visit.      Chief Complaint  Medicare Wellness-subsequent and Headache (Due to stress)       Ro Adorno is also being seen today for     Subjective        Patient reports worsening sinus pressure x 10 days with nasal congestion. Denies cough, fever, shortness of breath. Has tolerated Augmentin well in the past.    CKD-  Most recent GFR 20. Managed by Dr. Baum, nephrology, scheduled in February. Farxiga and Jardiance were too expensive so she did not start either medication. Continues hydroxyzine for itching. Has gained weight with stress.    HLD-  Managed with statin, most recent LDL 51. Well-tolerated, denies leg cramping.       GERD-  Has had intermittent reflux but not yet needed medication. Previously managed with pantoprazole prior to bariatric surgery.      Anxiety/depression/insomnia-  Managed with Lexapro, patient feels that she may benefit from a dosage increase. Has been very stressed with the recent loss of her brother in law and trying to help take care of her sister. Patient has also had multiple illnesses this year, including COVID and two UTI's. She does feel like she would benefit from something to decrease stress as she feels like this is impacting her appetite as well as her blood pressure.  Denies SI/HI.  Patient continues to alternate trazodone, melatonin and Tylenol PM as needed.      Hyperthyroid/thyroid nodule-  Managed with methimazole many years ago, most recent thyroid labs stable. Due for repeat thyroid ultrasound 1/2023.       SEVEN-  Received new CPAP, seems to be doing better with this. States it is a lot quieter than her previous machine.     Iron  "deficiency-  Managed with once daily iron supplement. Ferritin chronically elevated, cleared by nephrology to continue supplementation.      HTN-  Managed with Coreg and Lasix.  Patient reports home readings have been fluctuating with recent stress, averaging 140s/90s.  Denies chest pain, blurry vision, leg swelling. Has noticed an increase in headaches.      Chronic pain-  Managed by Pain Management with Norco and muscle relaxer. Patient had nerve ablation of the thoracic and lumbar spine, both of which improve symptoms.       PAP: 2019, normal   Mammogram: 1/2022; Denies family history of breast cancer  DEXA: 11/2020, due  Hepatitis C screening: Negative  Colonoscopy: 2019; Denies family history of colon cancer  COVID19 vaccination: Up to date  Pneumonia vaccination: Up to date  Influenza vaccination: Would like                   Objective   Vital Signs:  /68 (BP Location: Left arm, Patient Position: Sitting, Cuff Size: Large Adult)   Pulse 83   Temp 97.6 °F (36.4 °C) (Temporal)   Ht 152.4 cm (60\")   Wt 89.3 kg (196 lb 12.8 oz)   SpO2 100%   BMI 38.43 kg/m²     Physical Exam  Constitutional:       Appearance: Normal appearance.   HENT:      Head: Normocephalic and atraumatic.      Nose: Nose normal.      Mouth/Throat:      Mouth: Mucous membranes are moist.      Pharynx: Oropharynx is clear.   Eyes:      Extraocular Movements: Extraocular movements intact.      Conjunctiva/sclera: Conjunctivae normal.      Pupils: Pupils are equal, round, and reactive to light.   Neck:      Thyroid: No thyroid mass, thyromegaly or thyroid tenderness.   Cardiovascular:      Rate and Rhythm: Normal rate and regular rhythm.      Heart sounds: Normal heart sounds.   Pulmonary:      Effort: Pulmonary effort is normal.      Breath sounds: Normal breath sounds.   Skin:     General: Skin is warm and dry.   Neurological:      General: No focal deficit present.      Mental Status: She is alert and oriented to person, place, and " time.   Psychiatric:         Mood and Affect: Mood normal.         Behavior: Behavior normal.         Thought Content: Thought content normal.                   Assessment and Plan   Diagnoses and all orders for this visit:    1. Medicare annual wellness visit, subsequent (Primary)  Assessment & Plan:  Discussed age appropriate immunizations, screenings, nutrition and exercise. Age appropriate handout provided.        2. Acute non-recurrent frontal sinusitis  Assessment & Plan:  Augmentin to pharmacy at renal dosage, patient to call or return to clinic if symptoms worsen or persist.      3. Essential hypertension  Assessment & Plan:  Intermittent elevations, patient to continue Coreg and Lasix. Will monitor closely, may need to consider medication adjustments to maintain goal set by nephrology. Routine labs today.    Orders:  -     Comprehensive Metabolic Panel  -     Cancel: CBC & Differential    4. Stage 4 chronic kidney disease (HCC)  Assessment & Plan:  Most recent GFR 20, continue to follow with nephrology as scheduled. CMP today.      5. Hyperlipidemia, unspecified hyperlipidemia type  Assessment & Plan:  Well controlled, continue statin. Most recent LDL 51. Lipid panel with labs.      Orders:  -     Lipid Panel    6. Iron deficiency  Assessment & Plan:  Continue iron supplement, iron studies with labs.    Orders:  -     Ferritin  -     Iron Profile    7. Vitamin D deficiency  Assessment & Plan:  Continue vitamin D supplement, vitamin D level with labs today.       Orders:  -     Vitamin D 25 hydroxy    8. Hyperthyroidism  Assessment & Plan:  Patient without medication management x years. Repeat thyroid labs today, will determine if intervention is warranted based on results.    Orders:  -     Comprehensive Metabolic Panel  -     Cancel: CBC & Differential  -     Cancel: TSH  -     Cancel: T4, Free  -     TSH  -     T4, free    9. Major depressive disorder, recurrent, mild (HCC)  Assessment & Plan:  Mental  health worsening with recent stressors, continue Lexapro and will add Buspar PRN. Patient to continue to monitor, she will seek medical attention immediately if she feels that her mental health is deteriorating. Follow up in one month to evaluate medication effectiveness, sooner if concerns arise. Denies SI/HI. Will continue to monitor.         10. Anxiety    11. Insomnia, unspecified type  Assessment & Plan:  Continue trazodone PRN.      12. Gastroesophageal reflux disease without esophagitis  Assessment & Plan:  May consider restarting PPI if breakthrough symptoms worsen or persist, patient to call clinic if she would like to proceed with medication management.       13. Thyroid nodule  Comments:  Thyroid ultrasound ordered.  Orders:  -     US Thyroid; Future    14. Multiple joint pain  Assessment & Plan:  Continue to follow with pain management for prescription of Norco and muscle relaxer, will continue to monitor.       15. Leukocytosis, unspecified type  -     CBC w AUTO Differential    16. Postmenopausal  Comments:  DEXA ordered.  Orders:  -     DEXA Bone Density Axial; Future    17. Visit for screening mammogram  Comments:  Mammogram ordered.  Orders:  -     Mammo Screening Digital Tomosynthesis Bilateral With CAD; Future    18. Need for influenza vaccination  Comments:  Influenza vaccination today.    Other orders  -     busPIRone (BUSPAR) 5 MG tablet; Take 1 tablet by mouth 3 (Three) Times a Day As Needed (anxiety).  Dispense: 90 tablet; Refill: 1  -     Fluzone High-Dose 65+yrs (6108-2358)  -     amoxicillin-clavulanate (Augmentin) 500-125 MG per tablet; Take 1 tablet by mouth 2 (Two) Times a Day for 10 days.  Dispense: 20 tablet; Refill: 0           Follow Up   Return in about 1 month (around 1/20/2023).  Patient was given instructions and counseling regarding her condition or for health maintenance advice. Please see specific information pulled into the AVS if appropriate.

## 2022-12-20 NOTE — ASSESSMENT & PLAN NOTE
Intermittent elevations, patient to continue Coreg and Lasix. Will monitor closely, may need to consider medication adjustments to maintain goal set by nephrology. Routine labs today.

## 2022-12-20 NOTE — ASSESSMENT & PLAN NOTE
Mental health worsening with recent stressors, continue Lexapro and will add Buspar PRN. Patient to continue to monitor, she will seek medical attention immediately if she feels that her mental health is deteriorating. Follow up in one month to evaluate medication effectiveness, sooner if concerns arise. Denies SI/HI. Will continue to monitor.

## 2023-01-03 NOTE — TELEPHONE ENCOUNTER
Caller: Ro Adorno    Relationship: Self    Best call back number: [phone]    Requested Prescriptions:   Requested Prescriptions     Pending Prescriptions Disp Refills   • hydrOXYzine (ATARAX) 50 MG tablet 270 tablet 0     Sig: Take 1 tablet by mouth 3 (Three) Times a Day As Needed for Itching.   • ferrous sulfate (FeroSul) 325 (65 FE) MG tablet 90 tablet 0     Sig: Take 1 tablet by mouth Daily.   • simvastatin (Zocor) 40 MG tablet 90 tablet 1     Sig: Take 1 tablet by mouth Every Night.        Pharmacy where request should be sent: [address] [phone] [facility] [phone] FX       Does the patient have less than a 3 day supply:  [] Yes  [x] No    Would you like a call back once the refill request has been completed: [x] Yes [] No    If the office needs to give you a call back, can they leave a voicemail: [x] Yes [] No    Faby Dimas Rep   01/03/23 13:06 EST

## 2023-01-04 RX ORDER — SIMVASTATIN 40 MG
40 TABLET ORAL NIGHTLY
Qty: 90 TABLET | Refills: 1 | Status: SHIPPED | OUTPATIENT
Start: 2023-01-04

## 2023-01-04 RX ORDER — HYDROXYZINE 50 MG/1
50 TABLET, FILM COATED ORAL 3 TIMES DAILY PRN
Qty: 270 TABLET | Refills: 0 | Status: SHIPPED | OUTPATIENT
Start: 2023-01-04

## 2023-01-04 RX ORDER — FERROUS SULFATE 325(65) MG
1 TABLET ORAL DAILY
Qty: 90 TABLET | Refills: 0 | Status: SHIPPED | OUTPATIENT
Start: 2023-01-04

## 2023-01-20 ENCOUNTER — OFFICE VISIT (OUTPATIENT)
Dept: INTERNAL MEDICINE | Facility: CLINIC | Age: 68
End: 2023-01-20
Payer: MEDICARE

## 2023-01-20 VITALS
SYSTOLIC BLOOD PRESSURE: 124 MMHG | TEMPERATURE: 97.1 F | BODY MASS INDEX: 38.36 KG/M2 | OXYGEN SATURATION: 97 % | DIASTOLIC BLOOD PRESSURE: 86 MMHG | WEIGHT: 195.4 LBS | HEIGHT: 60 IN | RESPIRATION RATE: 15 BRPM | HEART RATE: 80 BPM

## 2023-01-20 DIAGNOSIS — S50.812A CAT SCRATCH OF FOREARM, LEFT, INITIAL ENCOUNTER: ICD-10-CM

## 2023-01-20 DIAGNOSIS — F41.9 ANXIETY: ICD-10-CM
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
## 2023-01-20 DIAGNOSIS — F33.0 MAJOR DEPRESSIVE DISORDER, RECURRENT, MILD: Primary | ICD-10-CM

## 2023-01-20 DIAGNOSIS — K21.9 GASTROESOPHAGEAL REFLUX DISEASE, UNSPECIFIED WHETHER ESOPHAGITIS PRESENT: ICD-10-CM

## 2023-01-20 PROCEDURE — 99214 OFFICE O/P EST MOD 30 MIN: CPT | Performed by: NURSE PRACTITIONER

## 2023-01-20 RX ORDER — FLUCONAZOLE 150 MG/1
150 TABLET ORAL ONCE
Qty: 2 TABLET | Refills: 0 | Status: SHIPPED | OUTPATIENT
Start: 2023-01-20 | End: 2023-01-20

## 2023-01-20 RX ORDER — DOXYCYCLINE HYCLATE 100 MG/1
100 CAPSULE ORAL 2 TIMES DAILY
Qty: 20 CAPSULE | Refills: 0 | Status: SHIPPED | OUTPATIENT
Start: 2023-01-20 | End: 2023-01-30

## 2023-01-20 RX ORDER — BUPROPION HYDROCHLORIDE 150 MG/1
150 TABLET ORAL DAILY
Qty: 30 TABLET | Refills: 1 | Status: SHIPPED | OUTPATIENT
Start: 2023-01-20 | End: 2023-03-28 | Stop reason: SDUPTHER

## 2023-01-20 NOTE — ASSESSMENT & PLAN NOTE
Due to surrounding erythema will treat empirically with doxycycline. Patient up to date on Tdap vaccination. She will monitor closely for worsening erythema, edema, warmth, streaking, fever and seek medical attention immediately with concerns.

## 2023-01-20 NOTE — ASSESSMENT & PLAN NOTE
Continue Lexapro, discontinue Buspar and trial Wellbutrin. Discussed potential side effects. Patient without seizure history. She will monitor closely and seek medical attention immediately if she feels that her mental health is deteriorating. Denies SI/HI. Follow up in one month to evaluate medication effectiveness, sooner if concerns arise.

## 2023-01-20 NOTE — PROGRESS NOTES
"Chief Complaint  Follow-up (1 month) and Anxiety    Subjective         Ro Adorno presents to [facility]  Anxiety/depression-  Patient reports she noticed no improvement with Buspar, even with taking three times a day. She continues to struggle with anxiety. Depression is well controlled with Lexapro.     GERD-  Stable, she does not want to pursue medication at this time.    Patient was scratched by her cat a few days ago, site has become red around the scratch. She is up to date on her tetanus vaccination. Denies fever, chills, abdominal pain. Site is not tender to touch. She was recently on an antibiotic for sinus infection, is prone to yeast infection with antibiotics.        Objective     Vitals:    01/20/23 1135   BP: 124/86   BP Location: Left arm   Patient Position: Sitting   Cuff Size: Large Adult   Pulse: 80   Resp: 15   Temp: 97.1 °F (36.2 °C)   SpO2: 97%   Weight: 88.6 kg (195 lb 6.4 oz)   Height: 152.4 cm (60\")      Body mass index is 38.16 kg/m².    Wt Readings from Last 3 Encounters:   01/20/23 88.6 kg (195 lb 6.4 oz)   12/20/22 89.3 kg (196 lb 12.8 oz)   09/13/22 87.2 kg (192 lb 3.9 oz)     BP Readings from Last 3 Encounters:   01/20/23 124/86   12/20/22 130/68   09/13/22 163/84                Physical Exam  Constitutional:       Appearance: Normal appearance.   HENT:      Head: Normocephalic and atraumatic.      Nose: Nose normal.      Mouth/Throat:      Mouth: Mucous membranes are moist.      Pharynx: Oropharynx is clear.   Eyes:      Extraocular Movements: Extraocular movements intact.      Conjunctiva/sclera: Conjunctivae normal.      Pupils: Pupils are equal, round, and reactive to light.   Cardiovascular:      Rate and Rhythm: Normal rate and regular rhythm.      Heart sounds: Normal heart sounds.   Pulmonary:      Effort: Pulmonary effort is normal.      Breath sounds: Normal breath sounds.   Skin:     General: Skin is warm and dry. "          Neurological:      General: No focal deficit present.      Mental Status: She is alert and oriented to person, place, and time.   Psychiatric:         Mood and Affect: Mood normal.         Behavior: Behavior normal.         Thought Content: Thought content normal.          Result Review :   The following data was reviewed by: MAYUR Nelson on 01/20/2023:      Procedures    Assessment and Plan   Diagnoses and all orders for this visit:    1. Major depressive disorder, recurrent, mild (HCC) (Primary)  Assessment & Plan:  Continue Lexapro, discontinue Buspar and trial Wellbutrin. Discussed potential side effects. Patient without seizure history. She will monitor closely and seek medical attention immediately if she feels that her mental health is deteriorating. Denies SI/HI. Follow up in one month to evaluate medication effectiveness, sooner if concerns arise.        2. Anxiety    3. Cat scratch of forearm, left, initial encounter  Assessment & Plan:  Due to surrounding erythema will treat empirically with doxycycline. Patient up to date on Tdap vaccination. She will monitor closely for worsening erythema, edema, warmth, streaking, fever and seek medical attention immediately with concerns.       4. Gastroesophageal reflux disease, unspecified whether esophagitis present  Assessment & Plan:  Intermittent. Patient declines medication management at this time.       Other orders  -     buPROPion XL (Wellbutrin XL) 150 MG 24 hr tablet; Take 1 tablet by mouth Daily.  Dispense: 30 tablet; Refill: 1  -     doxycycline (VIBRAMYCIN) 100 MG capsule; Take 1 capsule by mouth 2 (Two) Times a Day for 10 days.  Dispense: 20 capsule; Refill: 0  -     fluconazole (Diflucan) 150 MG tablet; Take 1 tablet by mouth 1 (One) Time for 1 dose. Repeat in three days if symptoms persist  Dispense: 2 tablet; Refill: 0        Follow Up   Return in about 1 month (around 2/20/2023).  Patient was given instructions and counseling  regarding her condition or for health maintenance advice. Please see specific information pulled into the AVS if appropriate.

## 2023-02-20 ENCOUNTER — LAB (OUTPATIENT)
Dept: LAB | Facility: HOSPITAL | Age: 68
End: 2023-02-20
Payer: MEDICARE

## 2023-02-20 ENCOUNTER — TRANSCRIBE ORDERS (OUTPATIENT)
Dept: ADMINISTRATIVE | Facility: HOSPITAL | Age: 68
End: 2023-02-20
Payer: MEDICARE

## 2023-02-20 DIAGNOSIS — N18.4 CHRONIC KIDNEY DISEASE, STAGE IV (SEVERE): ICD-10-CM

## 2023-02-20 DIAGNOSIS — N39.0 URINARY TRACT INFECTION WITHOUT HEMATURIA, SITE UNSPECIFIED: ICD-10-CM

## 2023-02-20 DIAGNOSIS — N18.4 CHRONIC KIDNEY DISEASE, STAGE IV (SEVERE): Primary | ICD-10-CM

## 2023-02-20 LAB
ALBUMIN SERPL-MCNC: 4.1 G/DL (ref 3.5–5.2)
ANION GAP SERPL CALCULATED.3IONS-SCNC: 7 MMOL/L (ref 5–15)
BASOPHILS # BLD AUTO: 0.1 10*3/MM3 (ref 0–0.2)
BASOPHILS NFR BLD AUTO: 0.9 % (ref 0–1.5)
BILIRUB UR QL STRIP: NEGATIVE
BUN SERPL-MCNC: 39 MG/DL (ref 8–23)
BUN/CREAT SERPL: 16 (ref 7–25)
CALCIUM SPEC-SCNC: 9.5 MG/DL (ref 8.6–10.5)
CHLORIDE SERPL-SCNC: 103 MMOL/L (ref 98–107)
CLARITY UR: CLEAR
CO2 SERPL-SCNC: 26 MMOL/L (ref 22–29)
COLOR UR: YELLOW
CREAT SERPL-MCNC: 2.44 MG/DL (ref 0.57–1)
CREAT UR-MCNC: 25.3 MG/DL
DEPRECATED RDW RBC AUTO: 43.3 FL (ref 37–54)
EGFRCR SERPLBLD CKD-EPI 2021: 21.2 ML/MIN/1.73
EOSINOPHIL # BLD AUTO: 0.51 10*3/MM3 (ref 0–0.4)
EOSINOPHIL NFR BLD AUTO: 4.4 % (ref 0.3–6.2)
ERYTHROCYTE [DISTWIDTH] IN BLOOD BY AUTOMATED COUNT: 12.6 % (ref 12.3–15.4)
GLUCOSE SERPL-MCNC: 90 MG/DL (ref 65–99)
GLUCOSE UR STRIP-MCNC: NEGATIVE MG/DL
HCT VFR BLD AUTO: 34.1 % (ref 34–46.6)
HGB BLD-MCNC: 11 G/DL (ref 12–15.9)
HGB UR QL STRIP.AUTO: NEGATIVE
IMM GRANULOCYTES # BLD AUTO: 0.42 10*3/MM3 (ref 0–0.05)
IMM GRANULOCYTES NFR BLD AUTO: 3.6 % (ref 0–0.5)
KETONES UR QL STRIP: NEGATIVE
LEUKOCYTE ESTERASE UR QL STRIP.AUTO: NEGATIVE
LYMPHOCYTES # BLD AUTO: 3.55 10*3/MM3 (ref 0.7–3.1)
LYMPHOCYTES NFR BLD AUTO: 30.8 % (ref 19.6–45.3)
MCH RBC QN AUTO: 30.1 PG (ref 26.6–33)
MCHC RBC AUTO-ENTMCNC: 32.3 G/DL (ref 31.5–35.7)
MCV RBC AUTO: 93.2 FL (ref 79–97)
MONOCYTES # BLD AUTO: 1.03 10*3/MM3 (ref 0.1–0.9)
MONOCYTES NFR BLD AUTO: 8.9 % (ref 5–12)
NEUTROPHILS NFR BLD AUTO: 5.9 10*3/MM3 (ref 1.7–7)
NEUTROPHILS NFR BLD AUTO: 51.4 % (ref 42.7–76)
NITRITE UR QL STRIP: NEGATIVE
NRBC BLD AUTO-RTO: 0 /100 WBC (ref 0–0.2)
PH UR STRIP.AUTO: 6.5 [PH] (ref 5–8)
PHOSPHATE SERPL-MCNC: 3.7 MG/DL (ref 2.5–4.5)
PLATELET # BLD AUTO: 286 10*3/MM3 (ref 140–450)
PMV BLD AUTO: 11 FL (ref 6–12)
POTASSIUM SERPL-SCNC: 4.5 MMOL/L (ref 3.5–5.2)
PROT ?TM UR-MCNC: <4 MG/DL
PROT ?TM UR-MCNC: <4 MG/DL
PROT UR QL STRIP: NEGATIVE
PROT/CREAT UR: NORMAL MG/G{CREAT}
PTH-INTACT SERPL-MCNC: 85.2 PG/ML (ref 15–65)
RBC # BLD AUTO: 3.66 10*6/MM3 (ref 3.77–5.28)
SODIUM SERPL-SCNC: 136 MMOL/L (ref 136–145)
SP GR UR STRIP: 1.01 (ref 1–1.03)
UROBILINOGEN UR QL STRIP: NORMAL
WBC NRBC COR # BLD: 11.51 10*3/MM3 (ref 3.4–10.8)

## 2023-02-20 PROCEDURE — 81003 URINALYSIS AUTO W/O SCOPE: CPT

## 2023-02-20 PROCEDURE — 83970 ASSAY OF PARATHORMONE: CPT

## 2023-02-20 PROCEDURE — 84156 ASSAY OF PROTEIN URINE: CPT

## 2023-02-20 PROCEDURE — 80069 RENAL FUNCTION PANEL: CPT

## 2023-02-20 PROCEDURE — 82306 VITAMIN D 25 HYDROXY: CPT

## 2023-02-20 PROCEDURE — 82570 ASSAY OF URINE CREATININE: CPT

## 2023-02-20 PROCEDURE — 36415 COLL VENOUS BLD VENIPUNCTURE: CPT

## 2023-02-20 PROCEDURE — 85025 COMPLETE CBC W/AUTO DIFF WBC: CPT

## 2023-02-21 LAB — 25(OH)D3 SERPL-MCNC: 70.7 NG/ML (ref 30–100)

## 2023-02-27 RX ORDER — ESCITALOPRAM OXALATE 20 MG/1
TABLET ORAL
Qty: 90 TABLET | Refills: 1 | Status: SHIPPED | OUTPATIENT
Start: 2023-02-27

## 2023-03-03 ENCOUNTER — OFFICE VISIT (OUTPATIENT)
Dept: INTERNAL MEDICINE | Facility: CLINIC | Age: 68
End: 2023-03-03
Payer: MEDICARE

## 2023-03-03 VITALS
WEIGHT: 196.38 LBS | TEMPERATURE: 98 F | HEART RATE: 94 BPM | SYSTOLIC BLOOD PRESSURE: 138 MMHG | RESPIRATION RATE: 18 BRPM | BODY MASS INDEX: 38.56 KG/M2 | OXYGEN SATURATION: 98 % | DIASTOLIC BLOOD PRESSURE: 78 MMHG | HEIGHT: 60 IN

## 2023-03-03 DIAGNOSIS — J02.9 ACUTE PHARYNGITIS, UNSPECIFIED ETIOLOGY: ICD-10-CM

## 2023-03-03 DIAGNOSIS — R10.30 LOWER ABDOMINAL PAIN: ICD-10-CM

## 2023-03-03 DIAGNOSIS — R50.9 FEVER IN ADULT: Primary | ICD-10-CM

## 2023-03-03 LAB
EXPIRATION DATE: NORMAL
EXPIRATION DATE: NORMAL
FLUAV AG UPPER RESP QL IA.RAPID: NOT DETECTED
FLUBV AG UPPER RESP QL IA.RAPID: NOT DETECTED
INTERNAL CONTROL: NORMAL
INTERNAL CONTROL: NORMAL
Lab: NORMAL
Lab: NORMAL
S PYO AG THROAT QL: NEGATIVE
SARS-COV-2 AG UPPER RESP QL IA.RAPID: NOT DETECTED

## 2023-03-03 PROCEDURE — 87205 SMEAR GRAM STAIN: CPT

## 2023-03-03 PROCEDURE — 87070 CULTURE OTHR SPECIMN AEROBIC: CPT

## 2023-03-03 PROCEDURE — 81003 URINALYSIS AUTO W/O SCOPE: CPT

## 2023-03-03 RX ORDER — ONDANSETRON 4 MG/1
4 TABLET, ORALLY DISINTEGRATING ORAL EVERY 8 HOURS PRN
Qty: 10 TABLET | Refills: 0 | Status: SHIPPED | OUTPATIENT
Start: 2023-03-03

## 2023-03-03 RX ORDER — DICYCLOMINE HCL 20 MG
20 TABLET ORAL EVERY 6 HOURS
Qty: 20 TABLET | Refills: 0 | Status: SHIPPED | OUTPATIENT
Start: 2023-03-03

## 2023-03-03 RX ORDER — LACTOBACILLUS ACIDOPHILUS 500MM CELL
1 CAPSULE ORAL DAILY
Qty: 30 CAPSULE | Refills: 2 | Status: SHIPPED | OUTPATIENT
Start: 2023-03-03 | End: 2023-06-01

## 2023-03-03 NOTE — PROGRESS NOTES
"Chief Complaint  Diarrhea (Diarrhea, stomach cramps, feels weak, and fever  since Tuesday, headache started today)    Subjective       Ro Connor presents to [facility]    HPI     Pt notes that two weeks ago she spiked a fever of 101, then resolved. Then 5 days ago pt developed diarrhea, weakness, mild nausea, umbilical abdominal pain, patient notes it feels like spasms, headache, decrease in appetite. Pt feels short of air with exertion, that started today.   Having 2-3 episodes of diarrhea. No blood in stool.   Temp ranging from 101-102 daily since 5 days ago.   Taking tylenol around the clock. Pt had tylenol today. Pt notes today is the first day she has not run a fever.  Drinking plenty of water   Not feeling better.    Denies vomiting, dyspnea, chest pain, cough.   Took malox and pepto, not helping  Pt takes daily probiotic.       Objective     Vitals:    03/03/23 1339   BP: 138/78   BP Location: Left arm   Patient Position: Sitting   Cuff Size: Adult   Pulse: 94   Resp: 18   Temp: 98 °F (36.7 °C)   TempSrc: Temporal   SpO2: 98%   Weight: 89.1 kg (196 lb 6 oz)   Height: 152.4 cm (60\")      Wt Readings from Last 3 Encounters:   03/03/23 89.1 kg (196 lb 6 oz)   01/20/23 88.6 kg (195 lb 6.4 oz)   12/20/22 89.3 kg (196 lb 12.8 oz)      BP Readings from Last 3 Encounters:   03/03/23 138/78   01/20/23 124/86   12/20/22 130/68        Body mass index is 38.35 kg/m².         Physical Exam  Constitutional:       Appearance: Normal appearance. She is not ill-appearing.   HENT:      Head: Normocephalic and atraumatic.      Right Ear: Tympanic membrane, ear canal and external ear normal.      Left Ear: Tympanic membrane, ear canal and external ear normal.      Nose: Nose normal.      Mouth/Throat:      Mouth: Mucous membranes are moist.      Pharynx: Oropharynx is clear. Posterior oropharyngeal erythema present.   Eyes:      Conjunctiva/sclera: Conjunctivae normal. "   Cardiovascular:      Rate and Rhythm: Normal rate and regular rhythm.      Pulses: Normal pulses.      Heart sounds: Normal heart sounds.   Pulmonary:      Effort: Pulmonary effort is normal.      Breath sounds: Normal breath sounds.   Abdominal:      General: Abdomen is flat. Bowel sounds are normal. There is no distension.      Palpations: Abdomen is soft.      Tenderness: There is abdominal tenderness (Generalized tenderness.). There is no guarding or rebound.   Lymphadenopathy:      Cervical: Cervical adenopathy present.   Neurological:      Mental Status: She is alert.          Procedures    Assessment and Plan   Diagnoses and all orders for this visit:    1. Fever in adult (Primary)  Assessment & Plan:  - Temperature normal today.  Seems to have fully resolved.  However continue to monitor closely.  -Patient negative for COVID, flu, strep.  We will send throat swab for culture.  Urinalysis unremarkable, will send for culture to rule out any growth of bacteria.  -Discussed with patient signs and symptoms that would require her to go to urgent care immediately.  -Would expect for symptoms to start to improve within the next 24-48 hours, if symptoms drastically progress or fail to improve please return to office  -Could trial probiotics to help with diarrhea.  Zofran for nausea.  And Bentyl for possible muscle spasms/abdominal pain.  -Advised patient to mostly do a liquid diet, and then slowly add other foods.  Avoid fried, fast food.    Orders:  -     POCT SARS-CoV-2 Antigen GALILEA + Flu  -     POCT rapid strep A  -     Wound Culture - Swab, Esophagus; Future  -     Wound Culture - Swab, Esophagus    2. Lower abdominal pain  -     Urinalysis With Culture If Indicated - Urine, Clean Catch  -     Cancel: Urinalysis, Microscopic Only - Urine, Clean Catch  -     Wound Culture - Swab, Esophagus; Future  -     Wound Culture - Swab, Esophagus    3. Acute pharyngitis, unspecified etiology  -     Wound Culture - Swab,  Esophagus; Future  -     Wound Culture - Swab, Esophagus    Other orders  -     Acidophilus Lactobacillus capsule; Take 1 capsule by mouth Daily for 90 days.  Dispense: 30 capsule; Refill: 2  -     ondansetron ODT (ZOFRAN-ODT) 4 MG disintegrating tablet; Place 1 tablet on the tongue Every 8 (Eight) Hours As Needed for Nausea or Vomiting.  Dispense: 10 tablet; Refill: 0  -     dicyclomine (BENTYL) 20 MG tablet; Take 1 tablet by mouth Every 6 (Six) Hours.  Dispense: 20 tablet; Refill: 0        Follow Up   Return if symptoms worsen or fail to improve.  Patient was given instructions and counseling regarding her condition or for health maintenance advice. Please see specific information pulled into the AVS if appropriate.

## 2023-03-05 LAB
BACTERIA SPEC AEROBE CULT: NORMAL
GRAM STN SPEC: NORMAL

## 2023-03-06 LAB
BILIRUB UR QL STRIP: NEGATIVE
CLARITY UR: CLEAR
COLOR UR: YELLOW
GLUCOSE UR STRIP-MCNC: NEGATIVE MG/DL
HGB UR QL STRIP.AUTO: NEGATIVE
KETONES UR QL STRIP: NEGATIVE
LEUKOCYTE ESTERASE UR QL STRIP.AUTO: NEGATIVE
NITRITE UR QL STRIP: NEGATIVE
PH UR STRIP.AUTO: 6 [PH] (ref 5–8)
PROT UR QL STRIP: ABNORMAL
SP GR UR STRIP: 1.01 (ref 1–1.03)
UROBILINOGEN UR QL STRIP: ABNORMAL

## 2023-03-09 RX ORDER — AMOXICILLIN 500 MG/1
500 CAPSULE ORAL 2 TIMES DAILY
Qty: 20 CAPSULE | Refills: 0 | Status: SHIPPED | OUTPATIENT
Start: 2023-03-09 | End: 2023-03-19

## 2023-03-09 RX ORDER — PANTOPRAZOLE SODIUM 40 MG/1
40 TABLET, DELAYED RELEASE ORAL DAILY
Qty: 90 TABLET | Refills: 1 | Status: SHIPPED | OUTPATIENT
Start: 2023-03-09 | End: 2023-06-07

## 2023-03-13 ENCOUNTER — HOSPITAL ENCOUNTER (OUTPATIENT)
Dept: BONE DENSITY | Facility: HOSPITAL | Age: 68
Discharge: HOME | End: 2023-03-13
Payer: MEDICARE

## 2023-03-13 ENCOUNTER — HOSPITAL ENCOUNTER (OUTPATIENT)
Dept: ULTRASOUND IMAGING | Facility: HOSPITAL | Age: 68
Discharge: HOME | End: 2023-03-13
Payer: MEDICARE

## 2023-03-13 ENCOUNTER — HOSPITAL ENCOUNTER (OUTPATIENT)
Dept: MAMMOGRAPHY | Facility: HOSPITAL | Age: 68
Discharge: HOME | End: 2023-03-13
Payer: MEDICARE

## 2023-03-13 DIAGNOSIS — E04.1 THYROID NODULE: ICD-10-CM

## 2023-03-13 DIAGNOSIS — Z12.31 VISIT FOR SCREENING MAMMOGRAM: ICD-10-CM

## 2023-03-13 DIAGNOSIS — Z78.0 POSTMENOPAUSAL: ICD-10-CM

## 2023-03-13 PROCEDURE — 77063 BREAST TOMOSYNTHESIS BI: CPT

## 2023-03-13 PROCEDURE — 77080 DXA BONE DENSITY AXIAL: CPT

## 2023-03-13 PROCEDURE — 77067 SCR MAMMO BI INCL CAD: CPT

## 2023-03-13 PROCEDURE — 76536 US EXAM OF HEAD AND NECK: CPT

## 2023-03-14 DIAGNOSIS — E04.1 LEFT THYROID NODULE: Primary | ICD-10-CM

## 2023-03-16 PROBLEM — R50.9 FEVER IN ADULT: Status: ACTIVE | Noted: 2023-03-16

## 2023-03-16 NOTE — ASSESSMENT & PLAN NOTE
- Temperature normal today.  Seems to have fully resolved.  However continue to monitor closely.  -Patient negative for COVID, flu, strep.  We will send throat swab for culture.  Urinalysis unremarkable, will send for culture to rule out any growth of bacteria.  -Discussed with patient signs and symptoms that would require her to go to urgent care immediately.  -Would expect for symptoms to start to improve within the next 24-48 hours, if symptoms drastically progress or fail to improve please return to office  -Could trial probiotics to help with diarrhea.  Zofran for nausea.  And Bentyl for possible muscle spasms/abdominal pain.  -Advised patient to mostly do a liquid diet, and then slowly add other foods.  Avoid fried, fast food.

## 2023-03-24 ENCOUNTER — TELEPHONE (OUTPATIENT)
Dept: INTERNAL MEDICINE | Facility: CLINIC | Age: 68
End: 2023-03-24

## 2023-03-24 RX ORDER — BUPROPION HYDROCHLORIDE 150 MG/1
150 TABLET ORAL DAILY
Qty: 30 TABLET | Refills: 1 | Status: CANCELLED | OUTPATIENT
Start: 2023-03-24

## 2023-03-24 NOTE — TELEPHONE ENCOUNTER
Caller: Ro Connor    Relationship: Self    Best call back number: [phone]    Requested Prescriptions:   Requested Prescriptions     Pending Prescriptions Disp Refills   • buPROPion XL (Wellbutrin XL) 150 MG 24 hr tablet 30 tablet 1     Sig: Take 1 tablet by mouth Daily.        Pharmacy where request should be sent: [address] [phone] [facility] [phone] FX     Last office visit with prescribing clinician: 1/20/2023   Last telemedicine visit with prescribing clinician: Visit date not found   Next office visit with prescribing clinician: Visit date not found       Does the patient have less than a 3 day supply:  [] Yes  [x] No    Would you like a call back once the refill request has been completed: [x] Yes [] No    If the office needs to give you a call back, can they leave a voicemail: [x] Yes [] No    Faby Almanza Rep   03/24/23 14:17 EDT

## 2023-03-28 DIAGNOSIS — F33.0 MAJOR DEPRESSIVE DISORDER, RECURRENT, MILD: Primary | ICD-10-CM

## 2023-03-28 RX ORDER — BUPROPION HYDROCHLORIDE 150 MG/1
150 TABLET ORAL DAILY
Qty: 30 TABLET | Refills: 1 | Status: SHIPPED | OUTPATIENT
Start: 2023-03-28

## 2023-04-03 ENCOUNTER — TELEPHONE (OUTPATIENT)
Dept: INTERNAL MEDICINE | Facility: CLINIC | Age: 68
End: 2023-04-03
Payer: MEDICARE

## 2023-04-03 DIAGNOSIS — M51.36 DDD (DEGENERATIVE DISC DISEASE), LUMBAR: Primary | ICD-10-CM

## 2023-04-03 NOTE — TELEPHONE ENCOUNTER
Patient wants to switch to pain doctors. To [facility].   Her current doctor retired.

## 2023-04-07 ENCOUNTER — TELEPHONE (OUTPATIENT)
Dept: INTERNAL MEDICINE | Facility: CLINIC | Age: 68
End: 2023-04-07
Payer: MEDICARE

## 2023-04-07 NOTE — TELEPHONE ENCOUNTER
Caller: Nikolas Ro CASSIE    Relationship: Self    Best call back number [phone]    Requested Prescriptions:   Requested Prescriptions     Pending Prescriptions Disp Refills   • ferrous sulfate (FeroSul) 325 (65 FE) MG tablet 90 tablet 0     Sig: Take 1 tablet by mouth Daily.        Pharmacy where request should be sent: [address] [phone] [facility] [phone] FX     Last office visit with prescribing clinician: 1/20/2023   Last telemedicine visit with prescribing clinician: Visit date not found   Next office visit with prescribing clinician: Visit date not found       Does the patient have less than a 3 day supply:  [x] Yes  [] No    Would you like a call back once the refill request has been completed: [x] Yes [] No    If the office needs to give you a call back, can they leave a voicemail: [x] Yes [] No    Faby Almanza Rep   04/07/23 11:06 EDT

## 2023-04-11 RX ORDER — FERROUS SULFATE 325(65) MG
1 TABLET ORAL DAILY
Qty: 90 TABLET | Refills: 0 | Status: SHIPPED | OUTPATIENT
Start: 2023-04-11

## 2023-05-02 ENCOUNTER — HOSPITAL ENCOUNTER (OUTPATIENT)
Dept: GENERAL RADIOLOGY | Facility: HOSPITAL | Age: 68
Discharge: HOME | End: 2023-05-02
Payer: MEDICARE

## 2023-05-02 ENCOUNTER — TRANSCRIBE ORDERS (OUTPATIENT)
Dept: ADMINISTRATIVE | Facility: HOSPITAL | Age: 68
End: 2023-05-02
Payer: MEDICARE

## 2023-05-02 DIAGNOSIS — M19.012 ARTHRITIS OF LEFT SHOULDER REGION: Primary | ICD-10-CM

## 2023-05-02 DIAGNOSIS — M47.817 LUMBOSACRAL SPONDYLOSIS WITHOUT MYELOPATHY: ICD-10-CM

## 2023-05-02 DIAGNOSIS — M19.90 ARTHRITIS: ICD-10-CM

## 2023-05-02 DIAGNOSIS — M19.011 ARTHRITIS OF RIGHT SHOULDER REGION: ICD-10-CM

## 2023-05-02 PROCEDURE — 73030 X-RAY EXAM OF SHOULDER: CPT

## 2023-05-02 PROCEDURE — 72110 X-RAY EXAM L-2 SPINE 4/>VWS: CPT

## 2023-05-22 RX ORDER — HYDROXYZINE 50 MG/1
50 TABLET, FILM COATED ORAL 3 TIMES DAILY PRN
Qty: 270 TABLET | Refills: 0 | Status: SHIPPED | OUTPATIENT
Start: 2023-05-22

## 2023-05-22 RX ORDER — FUROSEMIDE 20 MG/1
20 TABLET ORAL DAILY
Qty: 90 TABLET | Refills: 1 | Status: SHIPPED | OUTPATIENT
Start: 2023-05-22

## 2023-05-22 NOTE — TELEPHONE ENCOUNTER
Caller: Ro Connor CASSIE    Relationship: Self    Best call back number: [phone]    Requested Prescriptions:   Requested Prescriptions     Pending Prescriptions Disp Refills   • furosemide (LASIX) 20 MG tablet 90 tablet 1     Sig: Take 1 tablet by mouth Daily.   • hydrOXYzine (ATARAX) 50 MG tablet 270 tablet 0     Sig: Take 1 tablet by mouth 3 (Three) Times a Day As Needed for Itching.        Pharmacy where request should be sent: [address] [phone] [facility] [phone]      Last office visit with prescribing clinician: 1/20/2023   Last telemedicine visit with prescribing clinician: 5/22/2023   Next office visit with prescribing clinician: Visit date not found         Does the patient have less than a 3 day supply:  [] Yes  [x] No    Would you like a call back once the refill request has been completed: [] Yes [x] No    If the office needs to give you a call back, can they leave a voicemail: [] Yes [x] No    Faby Livingston Rep   05/22/23 10:06 EDT

## 2023-05-26 RX ORDER — CARVEDILOL 3.12 MG/1
TABLET ORAL
Qty: 180 TABLET | Refills: 1 | Status: SHIPPED | OUTPATIENT
Start: 2023-05-26

## 2023-05-26 RX ORDER — FUROSEMIDE 20 MG/1
TABLET ORAL
Qty: 90 TABLET | Refills: 1 | OUTPATIENT
Start: 2023-05-26

## 2023-05-30 ENCOUNTER — PATIENT ROUNDING (BHMG ONLY) (OUTPATIENT)
Dept: OTOLARYNGOLOGY | Facility: CLINIC | Age: 68
End: 2023-05-30

## 2023-05-30 ENCOUNTER — OFFICE VISIT (OUTPATIENT)
Dept: OTOLARYNGOLOGY | Facility: CLINIC | Age: 68
End: 2023-05-30

## 2023-05-30 VITALS
WEIGHT: 191.4 LBS | HEIGHT: 60 IN | SYSTOLIC BLOOD PRESSURE: 142 MMHG | HEART RATE: 83 BPM | TEMPERATURE: 97.6 F | BODY MASS INDEX: 37.58 KG/M2 | DIASTOLIC BLOOD PRESSURE: 84 MMHG

## 2023-05-30 DIAGNOSIS — E04.1 THYROID NODULE: Primary | ICD-10-CM

## 2023-05-30 RX ORDER — OFLOXACIN 3 MG/ML
SOLUTION/ DROPS OPHTHALMIC
COMMUNITY
Start: 2023-04-27

## 2023-05-30 RX ORDER — CYCLOBENZAPRINE HCL 10 MG
TABLET ORAL EVERY 8 HOURS SCHEDULED
COMMUNITY

## 2023-05-30 NOTE — PROGRESS NOTES
Patient Name: Ro Connor   Visit Date: 05/30/2023   Patient ID: [id number]  Provider: MAYUR Rodriguez    Sex: female  Location: [facility]   YOB: 1955  Location Address: [address] [place] [address], [address] [place] [address],?[place]?[id number]    Primary Care Provider Maria Elena Mullins APRN  Location Phone: [phone]    Referring Provider: MAYUR Baron        Chief Complaint  THYROID NODULE    Subjective   Ro Connor is a 67 y.o. female who presents to [facility] today as a consult from MAYUR Baron for evaluation of thyroid nodule.  She reports that she has known about the thyroid nodules for around 20 years.  She states that she used to be on medication for hypothyroidism but has been off of that for probably 10 years and her thyroid hormone levels have been normal.  Her most recent thyroid ultrasound was performed on 3/13/2023 and shows a 6 mm right-sided lower nodule classified as TI-RADS 5.  It is similar to prior exam from 2011.  Additionally in the left lobe there is a 7 mm T RADS 4 nodule.  She denies compressive symptoms or family history of thyroid cancer.    Current Outpatient Medications on File Prior to Visit   Medication Sig   • B Complex-Biotin-FA (, BIOTIN, PO) Take 1 tablet by mouth Daily.   • buPROPion XL (Wellbutrin XL) 150 MG 24 hr tablet Take 1 tablet by mouth Daily.   • Calcium Carbonate (CALCIUM 500 PO) Take 500 mg by mouth 2 (Two) Times a Day.   • carvedilol (COREG) 3.125 MG tablet TAKE ONE TABLET BY MOUTH TWICE A DAY WITH MEALS   • cyclobenzaprine (FLEXERIL) 10 MG tablet Every 8 (Eight) Hours.   • escitalopram (LEXAPRO) 20 MG tablet TAKE ONE TABLET BY MOUTH DAILY   • ferrous sulfate (FeroSul) 325 (65 FE) MG tablet Take 1 tablet by mouth Daily.   • furosemide (LASIX) 20 MG tablet Take 1 tablet by mouth Daily.   • HYDROcodone-acetaminophen (NORCO) 7.5-325 MG per tablet Take 1 tablet by mouth Every  "6 (Six) Hours As Needed.   • hydrOXYzine (ATARAX) 50 MG tablet Take 1 tablet by mouth 3 (Three) Times a Day As Needed for Itching.   • melatonin 1 MG tablet Take 1 tablet by mouth.   • ofloxacin (OCUFLOX) 0.3 % ophthalmic solution INSTILL ONE DROP IN SURGERY EYE FOUR TIMES DAILY FOR 7 DAYS   • ondansetron ODT (ZOFRAN-ODT) 4 MG disintegrating tablet Place 1 tablet on the tongue Every 8 (Eight) Hours As Needed for Nausea or Vomiting.   • pantoprazole (Protonix) 40 MG EC tablet Take 1 tablet by mouth Daily for 90 days.   • Pediatric Multivitamins-Iron (Flintstones Complete) 18 MG chewable tablet chewable tablet [place] multi vitamin one oral one daily.   Active   • Probiotic Product (FORTIFY PROBIOTIC WOMENS PO) Take  by mouth.   • simvastatin (Zocor) 40 MG tablet Take 1 tablet by mouth Every Night.   • traZODone (DESYREL) 100 MG tablet Take 1 tablet by mouth Every Night.   • Acidophilus Lactobacillus capsule Take 1 capsule by mouth Daily for 90 days. (Patient not taking: Reported on 5/30/2023)   • calcium carbonate (TUMS) 500 MG chewable tablet Chew 1 tablet Daily. (Patient not taking: Reported on 3/3/2023)   • dicyclomine (BENTYL) 20 MG tablet Take 1 tablet by mouth Every 6 (Six) Hours. (Patient not taking: Reported on 5/30/2023)   • [DISCONTINUED] cyclobenzaprine (FLEXERIL) 10 MG tablet Take 1 tablet by mouth 3 (Three) Times a Day As Needed.     No current facility-administered medications on file prior to visit.        Social History     Tobacco Use   • Smoking status: Never   • Smokeless tobacco: Never   Vaping Use   • Vaping Use: Never used   Substance Use Topics   • Alcohol use: Yes     Comment: HOLIDAYS AND VACATION   • Drug use: No       Objective     Vital Signs:   /84 (BP Location: Left arm, Patient Position: Sitting, Cuff Size: Adult)   Pulse 83   Temp 97.6 °F (36.4 °C) (Temporal)   Ht 152.4 cm (60\")   Wt 86.8 kg (191 lb 6.4 oz)   BMI 37.38 kg/m²       Physical Exam  Constitutional:       " General: She is not in acute distress.     Appearance: Normal appearance. She is not ill-appearing.   HENT:      Head: Normocephalic and atraumatic.      Jaw: There is normal jaw occlusion. No tenderness or pain on movement.      Salivary Glands: Right salivary gland is not diffusely enlarged or tender. Left salivary gland is not diffusely enlarged or tender.      Right Ear: Tympanic membrane, ear canal and external ear normal.      Left Ear: Tympanic membrane, ear canal and external ear normal.      Nose: Nose normal. No septal deviation.      Right Sinus: No maxillary sinus tenderness or frontal sinus tenderness.      Left Sinus: No maxillary sinus tenderness or frontal sinus tenderness.      Mouth/Throat:      Lips: Pink. No lesions.      Mouth: Mucous membranes are moist. No oral lesions.      Dentition: Normal dentition.      Tongue: No lesions.      Palate: No mass and lesions.      Pharynx: Oropharynx is clear. Uvula midline.      Tonsils: No tonsillar exudate.   Eyes:      Extraocular Movements: Extraocular movements intact.      Conjunctiva/sclera: Conjunctivae normal.      Pupils: Pupils are equal, round, and reactive to light.   Neck:      Thyroid: No thyroid mass, thyromegaly or thyroid tenderness.      Trachea: Trachea normal.   Pulmonary:      Effort: Pulmonary effort is normal. No respiratory distress.   Musculoskeletal:         General: Normal range of motion.      Cervical back: Normal range of motion and neck supple. No tenderness.   Lymphadenopathy:      Cervical: No cervical adenopathy.   Skin:     General: Skin is warm and dry.   Neurological:      General: No focal deficit present.      Mental Status: She is alert and oriented to person, place, and time.      Cranial Nerves: No cranial nerve deficit.      Motor: No weakness.      Gait: Gait normal.   Psychiatric:         Mood and Affect: Mood normal.         Behavior: Behavior normal.         Thought Content: Thought content normal.          Judgment: Judgment normal.               Result Review :         US Thyroid (03/13/2023 14:50)  Reviewed, see HPI     Assessment and Plan    Diagnoses and all orders for this visit:    1. Thyroid nodule (Primary)    I was able to review the recent thyroid ultrasound.  She has very small, subcentimeter thyroid nodules bilaterally, that are stable in appearance from a scan from 2011.  She is not having any compressive symptoms or overlying skin changes.  Thyroid is small on exam.  My recommendation would be to get another thyroid ultrasound in 2 to 3 years that she has been doing previously as there is no indication for biopsy.  The scans can be ordered by her PCP.       Follow Up   No follow-ups on file.  Patient was given instructions and counseling regarding her condition or for health maintenance advice. Please see specific information pulled into the AVS if appropriate.      MAYUR Rodriguez

## 2023-05-30 NOTE — PROGRESS NOTES
A My-Chart message has been sent to the patient for [facility]ING with [facility]

## 2023-06-06 ENCOUNTER — TELEPHONE (OUTPATIENT)
Dept: INTERNAL MEDICINE | Facility: CLINIC | Age: 68
End: 2023-06-06
Payer: MEDICARE

## 2023-06-06 RX ORDER — CARVEDILOL 3.12 MG/1
3.12 TABLET ORAL 2 TIMES DAILY WITH MEALS
Qty: 180 TABLET | Refills: 1 | Status: SHIPPED | OUTPATIENT
Start: 2023-06-06

## 2023-06-06 NOTE — TELEPHONE ENCOUNTER
Caller: Elioclaudiorussellying Ro CASSIE    Relationship: Self    Best call back number: [phone]     Requested Prescriptions:   Requested Prescriptions     Pending Prescriptions Disp Refills    carvedilol (COREG) 3.125 MG tablet 180 tablet 1     Sig: Take 1 tablet by mouth 2 (Two) Times a Day With Meals.        Pharmacy where request should be sent: [address] [phone] [facility] [phone]      Last office visit with prescribing clinician: 1/20/2023   Last telemedicine visit with prescribing clinician: Visit date not found   Next office visit with prescribing clinician: Visit date not found     Does the patient have less than a 3 day supply:  [] Yes  [x] No    Would you like a call back once the refill request has been completed: [] Yes [x] No    If the office needs to give you a call back, can they leave a voicemail: [] Yes [x] No    Jocelyne Wilson, PCT   06/06/23 11:19 EDT

## 2023-07-24 DIAGNOSIS — F33.0 MAJOR DEPRESSIVE DISORDER, RECURRENT, MILD: ICD-10-CM

## 2023-07-24 RX ORDER — BUPROPION HYDROCHLORIDE 150 MG/1
TABLET ORAL
Qty: 30 TABLET | Refills: 0 | Status: SHIPPED | OUTPATIENT
Start: 2023-07-24

## 2023-08-30 ENCOUNTER — LAB (OUTPATIENT)
Dept: LAB | Facility: HOSPITAL | Age: 68
End: 2023-08-30
Payer: MEDICARE

## 2023-08-30 ENCOUNTER — TRANSCRIBE ORDERS (OUTPATIENT)
Dept: ADMINISTRATIVE | Facility: HOSPITAL | Age: 68
End: 2023-08-30
Payer: MEDICARE

## 2023-08-30 DIAGNOSIS — N18.4 CHRONIC KIDNEY DISEASE, STAGE IV (SEVERE): ICD-10-CM

## 2023-08-30 DIAGNOSIS — N18.4 CHRONIC KIDNEY DISEASE, STAGE IV (SEVERE): Primary | ICD-10-CM

## 2023-08-30 LAB
ANION GAP SERPL CALCULATED.3IONS-SCNC: 11.2 MMOL/L (ref 5–15)
BACTERIA UR QL AUTO: NORMAL /HPF
BASOPHILS # BLD AUTO: 0.05 10*3/MM3 (ref 0–0.2)
BASOPHILS NFR BLD AUTO: 0.7 % (ref 0–1.5)
BILIRUB UR QL STRIP: NEGATIVE
BUN SERPL-MCNC: 56 MG/DL (ref 8–23)
BUN/CREAT SERPL: 23 (ref 7–25)
CALCIUM SPEC-SCNC: 9.3 MG/DL (ref 8.6–10.5)
CHLORIDE SERPL-SCNC: 108 MMOL/L (ref 98–107)
CLARITY UR: CLEAR
CO2 SERPL-SCNC: 22.8 MMOL/L (ref 22–29)
COLOR UR: YELLOW
CREAT SERPL-MCNC: 2.43 MG/DL (ref 0.57–1)
CREAT UR-MCNC: 31.9 MG/DL
DEPRECATED RDW RBC AUTO: 45.8 FL (ref 37–54)
EGFRCR SERPLBLD CKD-EPI 2021: 21.2 ML/MIN/1.73
EOSINOPHIL # BLD AUTO: 0.78 10*3/MM3 (ref 0–0.4)
EOSINOPHIL NFR BLD AUTO: 10.3 % (ref 0.3–6.2)
ERYTHROCYTE [DISTWIDTH] IN BLOOD BY AUTOMATED COUNT: 13.4 % (ref 12.3–15.4)
GLUCOSE SERPL-MCNC: 90 MG/DL (ref 65–99)
GLUCOSE UR STRIP-MCNC: NEGATIVE MG/DL
HCT VFR BLD AUTO: 33.9 % (ref 34–46.6)
HGB BLD-MCNC: 11 G/DL (ref 12–15.9)
HGB UR QL STRIP.AUTO: NEGATIVE
HYALINE CASTS UR QL AUTO: NORMAL /LPF
IMM GRANULOCYTES # BLD AUTO: 0.02 10*3/MM3 (ref 0–0.05)
IMM GRANULOCYTES NFR BLD AUTO: 0.3 % (ref 0–0.5)
KETONES UR QL STRIP: NEGATIVE
LEUKOCYTE ESTERASE UR QL STRIP.AUTO: ABNORMAL
LYMPHOCYTES # BLD AUTO: 2.63 10*3/MM3 (ref 0.7–3.1)
LYMPHOCYTES NFR BLD AUTO: 34.7 % (ref 19.6–45.3)
MCH RBC QN AUTO: 30.2 PG (ref 26.6–33)
MCHC RBC AUTO-ENTMCNC: 32.4 G/DL (ref 31.5–35.7)
MCV RBC AUTO: 93.1 FL (ref 79–97)
MONOCYTES # BLD AUTO: 0.76 10*3/MM3 (ref 0.1–0.9)
MONOCYTES NFR BLD AUTO: 10 % (ref 5–12)
NEUTROPHILS NFR BLD AUTO: 3.34 10*3/MM3 (ref 1.7–7)
NEUTROPHILS NFR BLD AUTO: 44 % (ref 42.7–76)
NITRITE UR QL STRIP: NEGATIVE
NRBC BLD AUTO-RTO: 0 /100 WBC (ref 0–0.2)
PH UR STRIP.AUTO: 6 [PH] (ref 5–8)
PHOSPHATE SERPL-MCNC: 3.7 MG/DL (ref 2.5–4.5)
PLATELET # BLD AUTO: 258 10*3/MM3 (ref 140–450)
PMV BLD AUTO: 11.2 FL (ref 6–12)
POTASSIUM SERPL-SCNC: 4.5 MMOL/L (ref 3.5–5.2)
PROT ?TM UR-MCNC: <4 MG/DL
PROT UR QL STRIP: NEGATIVE
PROT/CREAT UR: NORMAL MG/G{CREAT}
PTH-INTACT SERPL-MCNC: 126 PG/ML (ref 15–65)
RBC # BLD AUTO: 3.64 10*6/MM3 (ref 3.77–5.28)
RBC # UR STRIP: NORMAL /HPF
REF LAB TEST METHOD: NORMAL
SODIUM SERPL-SCNC: 142 MMOL/L (ref 136–145)
SP GR UR STRIP: 1.01 (ref 1–1.03)
SQUAMOUS #/AREA URNS HPF: NORMAL /HPF
UROBILINOGEN UR QL STRIP: ABNORMAL
WBC # UR STRIP: NORMAL /HPF
WBC NRBC COR # BLD: 7.58 10*3/MM3 (ref 3.4–10.8)

## 2023-08-30 PROCEDURE — 84156 ASSAY OF PROTEIN URINE: CPT

## 2023-08-30 PROCEDURE — 83970 ASSAY OF PARATHORMONE: CPT

## 2023-08-30 PROCEDURE — 84100 ASSAY OF PHOSPHORUS: CPT

## 2023-08-30 PROCEDURE — 80048 BASIC METABOLIC PNL TOTAL CA: CPT

## 2023-08-30 PROCEDURE — 85025 COMPLETE CBC W/AUTO DIFF WBC: CPT

## 2023-08-30 PROCEDURE — 81001 URINALYSIS AUTO W/SCOPE: CPT

## 2023-08-30 PROCEDURE — 36415 COLL VENOUS BLD VENIPUNCTURE: CPT

## 2023-08-30 PROCEDURE — 82570 ASSAY OF URINE CREATININE: CPT

## 2023-09-05 ENCOUNTER — TREATMENT (OUTPATIENT)
Dept: PHYSICAL THERAPY | Facility: CLINIC | Age: 68
End: 2023-09-05
Payer: MEDICARE

## 2023-09-05 DIAGNOSIS — M54.50 CHRONIC BILATERAL LOW BACK PAIN, UNSPECIFIED WHETHER SCIATICA PRESENT: Primary | ICD-10-CM

## 2023-09-05 DIAGNOSIS — G89.29 CHRONIC BILATERAL LOW BACK PAIN, UNSPECIFIED WHETHER SCIATICA PRESENT: Primary | ICD-10-CM

## 2023-09-05 DIAGNOSIS — R26.9 GAIT DISTURBANCE: ICD-10-CM

## 2023-09-05 DIAGNOSIS — R26.89 BALANCE DISORDER: ICD-10-CM

## 2023-09-05 DIAGNOSIS — R29.898 WEAKNESS OF BOTH LOWER EXTREMITIES: ICD-10-CM

## 2023-09-05 NOTE — PROGRESS NOTES
Physical Therapy Initial Evaluation and Plan of Care  [address] [place] [id number]    Patient: Ro Connor   : 1955  Diagnosis/ICD-10 Code:  Chronic bilateral low back pain, unspecified whether sciatica present [M54.50, G89.29]  Referring practitioner: MAYUR Leigh  Date of Initial Visit: 2023  Today's Date: 2023  Patient seen for 1 sessions           Subjective Questionnaire:     30 seconds STS: without UE support - table all the way down: 5 times     TU seconds with STC     Subjective Evaluation    History of Present Illness  Mechanism of injury: Pt presents to PT with reports of chronic low back pain. Pt reports she has had low back pain for several years and has had multiple injections and ablations. Pt reports she will occasionally get pain down R LE. Pt reports her biggest goal for therapy is to be able to gain the strength to be able to ascend/descend 4 steps to enter her house without cane or pulling herself up. Pt reports she even has difficulty ascending/descending 1 step to enter stores. Pt reports she uses a cane for community ambulation and no AD for household ambulation and has used a cane on and off for 3 years. Pt reports she has pain all over due to arthritis in joints. Pt has had an x-ray revealing:Mild scoliosis concave to the left. Moderate to severe degenerative disc disease at L2-L3 through L5-S1 and mild at L1-L2. Facet arthropathy at L4-L5 and L5-S1, resulting in a few mm L5 anterolisthesis. No pars defects. No abnormal intervertebral motion between flexion and extension.      Patient Occupation: Retired Pain  Current pain ratin  At best pain ratin  At worst pain ratin  Quality: throbbing, dull ache, discomfort, burning, sharp and tight  Relieving factors: medications, rest and heat  Aggravating factors: standing, ambulation, squatting, stairs and movement (Vacuuming, sweeping, and doing dishes)    Treatments  Previous treatment:  physical therapy  Patient Goals  Patient goals for therapy: increased motion, independence with ADLs/IADLs, increased strength and improved balance  Patient goal: To be able to get up and down four stairs to get into house         Objective          Neurological Testing     Sensation     Lumbar   Left   Intact: light touch    Right   Intact: light touch    Strength/Myotome Testing     Left Hip   Planes of Motion   Flexion: 3+  Extension: 3+  Abduction: 3+  Adduction: 3+    Right Hip   Planes of Motion   Flexion: 4-  Extension: 4-  Abduction: 4-  Adduction: 4-    Left Knee   Flexion: 4-  Extension: 4    Right Knee   Flexion: 4  Extension: 4    Left Ankle/Foot   Dorsiflexion: 5    Right Ankle/Foot   Dorsiflexion: 5    Ambulation     Observational Gait     Additional Observational Gait Details  Pt ambulates with and without STC with trendelenburg gait, decrease in gait speed, and decrease in heel strike B.     Functional Assessment     Comments  Feet together with EO: 16 seconds with significant sway  Tandem R foot in front: 5 seconds  Tandem L foot in front: 3 seconds     Pt is able to ascend/descend 4 steps with STC with leading up with R with step to pattern.  Pt has significant increase in difficulty and requires B upper extremities on handrail to pull herself up stairs without cane.       See Exercise, Manual, and Modality Logs for complete treatment.       Assessment & Plan       Assessment  Impairments: abnormal gait, abnormal muscle tone, abnormal or restricted ROM, activity intolerance, impaired balance, impaired physical strength, lacks appropriate home exercise program, pain with function, safety issue and weight-bearing intolerance   Functional limitations: lifting, walking, uncomfortable because of pain and standing   Assessment details: Pt presents to PT with reports of chronic low back pain. Pt's biggest goal is to improve patient's strength in B LE's. Pt has significant weakness in B LE's with L LE  being weaker then R LE. Pt also has significant decrease in balance with EO and on stable surface. Pt lastly has significant difficulty with functional tasks such as ambulation and ascending/descending stairs. Pt requires skilled PT in order to address deficits listed to return patient back to maximum function. Initiated patient's HEP and patient tolerated well with no reports of increase in pain.   Prognosis: good    Goals  Plan Goals: LUMBAR PROBLEMS:    1. The patient has complaints of pain.    LTG 1: 12 weeks: The patient will report lower back pain no greater than 3/10 in order to improve tolerance with activities of daily living and improve sleep quality.    STATUS: New    STG 1a: 6 weeks: The patient will report lower back pain no greater than 5/10.     STATUS: New    2. The patient demonstrates weakness of the bilateral hip.    LTG 2: 12 weeks: The patient will demonstrate 4+/5 strength for bilateral hip flexion, abduction, and extension in order to improve hip stability.    STATUS: New      STG 2a: 6 weeks: The patient will demonstrate 4/5 strength for bilateral hip flexion, abduction, and extension.    STATUS: New      3. The patient has limited strength of the bilateral knee.    LTG 3: 12 weeks: The patient will demonstrate 5/5 strength for bilateral knee flexion and extension in order to allow patient improved joint stability.    STATUS: New    STG 3a: 6 weeks: The patient will demonstrate 4+/5 strength for bilateral knee flexion and extension    STATUS: New      4. The patient has gait dysfunction.    LTG 4: 12 weeks: The patient will ambulate without assistive device, independently, for community distances with minimal trendelenburg gait in order to improve mobility and allow patient to perform activities such as grocery shopping with greater ease.    STATUS: New    BALANCE    1. Mobility: Walking/Moving Around Functional Limitation      LTG 1: 12 weeks:  The patient will demonstrate TUG score <12  seconds for reduced fall risk.       STATUS:  New    STG 1a: 6 weeks:  The patient will demonstrate TUG score <13 seconds for reduced fall risk.       STATUS:  New       2. The patient has difficulty with balance.        LTG 2: 12 weeks: The patient will hold the sharpened romberg position with eyes open for 20 seconds in order to improve balance and decrease risk of falling.     STATUS: New     STG 2: 6 weeks: The patient will hold the feet together position with eyes closed for 20 seconds in order to improve balance and decrease risk of falling.       STATUS: New       Plan  Therapy options: will be seen for skilled therapy services  Planned modality interventions: cryotherapy, TENS, thermotherapy (hydrocollator packs) and traction  Planned therapy interventions: manual therapy, neuromuscular re-education, ADL retraining, balance/weight-bearing training, flexibility, functional ROM exercises, gait training, home exercise program, joint mobilization, soft tissue mobilization, strengthening, stretching, therapeutic activities, abdominal trunk stabilization, postural training and spinal/joint mobilization  Frequency: 2x week  Duration in weeks: 12  Treatment plan discussed with: patient      History # of Personal Factors and/or Comorbidities: LOW (0)  Examination of Body System(s): # of elements: LOW (1-2)  Clinical Presentation: STABLE   Clinical Decision Making: LOW       Timed:         Manual Therapy:    0     mins  41296;     Therapeutic Exercise:    8     mins  67933;     Neuromuscular Coy:    0    mins  03624;    Therapeutic Activity:     0     mins  09431;     Gait Trainin     mins  51741;     Ultrasound:     0     mins  33173;    Ionto                               0    mins   78424  Self pay                         0     mins [email or web address]    Un-Timed:  Electrical Stimulation:    0     mins  11984 (MC )  Traction     0     mins 78614  Low Eval     40     Mins  69580  Mod Eval     0     Mins   63030  High Eval                       0     Mins  90082  Self Pay Eval                 0     PTSP1   Re-Eval                           0    mins  81559        Timed Treatment:   8   mins   Total Treatment:     48   mins    PT SIGNATURE: Electronically signed by Gelacio Chaves PT  [place] LICENSE: [id number]    DATE TREATMENT INITIATED: 9/5/2023    Initial Certification  Certification Period: 9/5/2023 thru 12/3/2023  I certify that the therapy services are furnished while this patient is under my care.  The services outlined above are required by this patient, and will be reviewed every 90 days.     PHYSICIAN: Nahomi Encarnacion APRN   NPI: [id number]      DATE:     Please sign and return via fax to [phone] Thank you, [facility].

## 2023-09-06 DIAGNOSIS — F33.0 MAJOR DEPRESSIVE DISORDER, RECURRENT, MILD: ICD-10-CM

## 2023-09-07 ENCOUNTER — TREATMENT (OUTPATIENT)
Dept: PHYSICAL THERAPY | Facility: CLINIC | Age: 68
End: 2023-09-07
Payer: MEDICARE

## 2023-09-07 DIAGNOSIS — R26.89 BALANCE DISORDER: ICD-10-CM

## 2023-09-07 DIAGNOSIS — R29.898 WEAKNESS OF BOTH LOWER EXTREMITIES: ICD-10-CM

## 2023-09-07 DIAGNOSIS — G89.29 CHRONIC BILATERAL LOW BACK PAIN, UNSPECIFIED WHETHER SCIATICA PRESENT: Primary | ICD-10-CM

## 2023-09-07 DIAGNOSIS — M54.50 CHRONIC BILATERAL LOW BACK PAIN, UNSPECIFIED WHETHER SCIATICA PRESENT: Primary | ICD-10-CM

## 2023-09-07 DIAGNOSIS — R26.9 GAIT DISTURBANCE: ICD-10-CM

## 2023-09-07 RX ORDER — BUPROPION HYDROCHLORIDE 150 MG/1
TABLET ORAL
Qty: 30 TABLET | Refills: 0 | Status: SHIPPED | OUTPATIENT
Start: 2023-09-07

## 2023-09-07 NOTE — PROGRESS NOTES
Physical Therapy Daily Note  [address] [place] [address]    VISIT#: 2    Subjective   Ro Connor reports she is having a rough day with pain due to the weather. Pt presents today with no cane but states her cane is in the car.       Objective     See Exercise, Manual, and Modality Logs for complete treatment.     Assessment/Plan    Initiated several of patient's strengthening exercises today and patient tolerated well. Pt required verbal cues to decrease hip ER with side stepping secondary to hip weakness. Pt has significant increase in difficulty with step ups leading with L LE compared to R LE due to increase in weakness in L LE. Will continue to progress patient as able to improve strength.     Progress per Plan of Care and Progress strengthening /stabilization /functional activity            Timed:                 Manual Therapy:    0     mins  73097;     Therapeutic Exercise:    15     mins  86699;     Neuromuscular Coy:    0    mins  73148;    Therapeutic Activity:     13     mins  57716;     Gait Trainin     mins  76546;     Ultrasound:     0     mins  77840;    Ionto                               0    mins   95555  Self pay                         0     mins [email or web address]    Un-Timed:  Electrical Stimulation:    0     mins  46304 ( )  Canal[employer]    0     mins 67268  Dry Needling     0     mins self-pay  Traction     0     mins 04057    Timed Treatment:   28   mins   Total Treatment:     28   mins    Gelacio Chaves PT  Physical Therapist    PT SIGNATURE: Electronically signed by Gelacio Chaves PT  [place] LICENSE: [id number]

## 2023-09-11 ENCOUNTER — TREATMENT (OUTPATIENT)
Dept: PHYSICAL THERAPY | Facility: CLINIC | Age: 68
End: 2023-09-11
Payer: MEDICARE

## 2023-09-11 DIAGNOSIS — R29.898 WEAKNESS OF BOTH LOWER EXTREMITIES: ICD-10-CM

## 2023-09-11 DIAGNOSIS — M54.50 CHRONIC BILATERAL LOW BACK PAIN, UNSPECIFIED WHETHER SCIATICA PRESENT: Primary | ICD-10-CM

## 2023-09-11 DIAGNOSIS — R26.89 BALANCE DISORDER: ICD-10-CM

## 2023-09-11 DIAGNOSIS — R26.9 GAIT DISTURBANCE: ICD-10-CM

## 2023-09-11 DIAGNOSIS — G89.29 CHRONIC BILATERAL LOW BACK PAIN, UNSPECIFIED WHETHER SCIATICA PRESENT: Primary | ICD-10-CM

## 2023-09-11 NOTE — PROGRESS NOTES
Physical Therapy Daily Treatment Note      Patient: Ro Connor   : 1955  Referring practitioner: MAYUR Leigh  Date of Initial Visit: Type: THERAPY  Noted: 2023  Today's Date: 2023  Patient seen for 3 sessions           Subjective Questionnaire:       Subjective Evaluation    History of Present Illness    Subjective comment: Pt reported hips at inguinal area hurt today.  Pt reports she has been having muscle spasms in low back today.  Pt reports rest, heating pad and flexoril helps and she sometimes applies an old TENs unit.     Objective   See Exercise, Manual, and Modality Logs for complete treatment.       Assessment & Plan       Assessment  Assessment details: Pt tolerated requires BUE support for step downs as her R knee cannot support her.  Pt reports at home she descends stairs holding rail on one side and STC in other hand.  Pt tolerated all other strengthening well.  Continue with POC.      Visit Diagnoses:    ICD-10-CM ICD-9-CM   1. Chronic bilateral low back pain, unspecified whether sciatica present  M54.50 724.2    G89.29 338.29   2. Weakness of both lower extremities  R29.898 729.89   3. Balance disorder  R26.89 781.99   4. Gait disturbance  R26.9 781.2       Progress per Plan of Care and Progress strengthening /stabilization /functional activity           Timed:  Manual Therapy:         mins  98058;  Therapeutic Exercise:    20     mins  31674;     Neuromuscular Coy:        mins  03408;    Therapeutic Activity:     10     mins  07848;     Gait Training:           mins  71692;     Ultrasound:          mins  43344;    Electrical Stimulation:         mins  55981 ( );  Aquatic Therapy          mins  42248    Untimed:  Electrical Stimulation:         mins  00252 ( );  Mechanical Traction:         mins  54716;     Timed Treatment:   30   mins   Total Treatment:     30   mins    Electronically signed    Kathia Sauer PTA  Physical Therapist Assistant    [facility]  license: [id number]

## 2023-09-13 ENCOUNTER — TREATMENT (OUTPATIENT)
Dept: PHYSICAL THERAPY | Facility: CLINIC | Age: 68
End: 2023-09-13
Payer: MEDICARE

## 2023-09-13 DIAGNOSIS — M54.50 CHRONIC BILATERAL LOW BACK PAIN, UNSPECIFIED WHETHER SCIATICA PRESENT: Primary | ICD-10-CM

## 2023-09-13 DIAGNOSIS — R26.9 GAIT DISTURBANCE: ICD-10-CM

## 2023-09-13 DIAGNOSIS — G89.29 CHRONIC BILATERAL LOW BACK PAIN, UNSPECIFIED WHETHER SCIATICA PRESENT: Primary | ICD-10-CM

## 2023-09-13 DIAGNOSIS — R26.89 BALANCE DISORDER: ICD-10-CM

## 2023-09-13 NOTE — PROGRESS NOTES
Physical Therapy Daily Treatment Note      Patient: Ro Connor   : 1955  Referring practitioner: MAYUR Leigh  Date of Initial Visit: Type: THERAPY  Noted: 2023  Today's Date: 2023  Patient seen for 4 sessions           Subjective Questionnaire:       Subjective Evaluation    History of Present Illness    Subjective comment: Pt reports she isn't sure what exercise did it but after last session B quads were in pain /10, were better yesterday and better today but still hurt.Pain  Current pain ratin         Objective   See Exercise, Manual, and Modality Logs for complete treatment.       Assessment & Plan       Assessment  Assessment details: Pt ambulates with antalgic gait using STC.  Pt tolerated strengthening today leaving out [employer] squats.  Pt requires BUE support to perform L step ups.  Pt tolerated new strengthening exercise.  Continue with POC.      Visit Diagnoses:    ICD-10-CM ICD-9-CM   1. Chronic bilateral low back pain, unspecified whether sciatica present  M54.50 724.2    G89.29 338.29   2. Balance disorder  R26.89 781.99   3. Gait disturbance  R26.9 781.2       Progress per Plan of Care and Progress strengthening /stabilization /functional activity           Timed:  Manual Therapy:         mins  48693;  Therapeutic Exercise:    20     mins  41225;     Neuromuscular Coy:        mins  54112;    Therapeutic Activity:     8     mins  11706;     Gait Training:           mins  26500;     Ultrasound:          mins  01642;    Electrical Stimulation:         mins  01152 ( );  Aquatic Therapy          mins  93286    Untimed:  Electrical Stimulation:         mins  31533 ( );  Mechanical Traction:         mins  36424;     Timed Treatment:   28   mins   Total Treatment:     28   mins    Electronically signed    Kathia Sauer PTA  Physical Therapist Assistant    [facility] license: [id number]

## 2023-09-25 ENCOUNTER — TREATMENT (OUTPATIENT)
Dept: PHYSICAL THERAPY | Facility: CLINIC | Age: 68
End: 2023-09-25

## 2023-09-25 DIAGNOSIS — R26.9 GAIT DISTURBANCE: ICD-10-CM

## 2023-09-25 DIAGNOSIS — G89.29 CHRONIC BILATERAL LOW BACK PAIN, UNSPECIFIED WHETHER SCIATICA PRESENT: Primary | ICD-10-CM

## 2023-09-25 DIAGNOSIS — R29.898 WEAKNESS OF BOTH LOWER EXTREMITIES: ICD-10-CM

## 2023-09-25 DIAGNOSIS — R26.89 BALANCE DISORDER: ICD-10-CM

## 2023-09-25 DIAGNOSIS — M54.50 CHRONIC BILATERAL LOW BACK PAIN, UNSPECIFIED WHETHER SCIATICA PRESENT: Primary | ICD-10-CM

## 2023-09-25 NOTE — PROGRESS NOTES
"Physical Therapy Daily Treatment Note  [place] [facility] [address] [place] [address]    Patient: Ro Connor   : 1955  Referring practitioner: MAYUR Leigh  Date of Initial Visit: Type: THERAPY  Noted: 2023  Today's Date: 2023  Patient seen for 5 sessions           Subjective  Ro Connor reports: she stopped doing her HEP for a few days after her recent fall but has just returned to doing them. \"I have had arthritis since I was 23y.o.\"    \"I feel unstable when I am walking. We are going to work on my balance also.\"    Objective   Special tenderness to palpation from T 9 to  sacrum, paraspinals. PTA addressed trendelenburg gait pattern with Ro recognizing she walks this way but is more unstable when she attempts to control the trunk lateral flexion.     See Exercise, Manual, and Modality Logs for complete treatment.       Assessment/Plan  Ro performed a few new exercises today, tolerating all well. Therapist intervention remains necessary to address pain through appropriate strengthening and balance activities.    Visit Diagnoses:    ICD-10-CM ICD-9-CM   1. Chronic bilateral low back pain, unspecified whether sciatica present  M54.50 724.2    G89.29 338.29   2. Balance disorder  R26.89 781.99   3. Gait disturbance  R26.9 781.2   4. Weakness of both lower extremities  R29.898 729.89       Progress per Plan of Care and Progress strengthening /stabilization /functional activity           Timed  Manual Therapy:         mins  28083;  Therapeutic Exercise:   15      mins  47381;     Neuromuscular Coy:        mins  58713;    Therapeutic Activity:  14        mins  64884;     Gait Training:           mins  18355;     Ultrasound:          mins  00844;    Electrical Stimulation:         mins  07783 ( );  Aquatics  __   mins   03248    Untimed:  Electrical Stimulation:         mins  20927 ( );  Mechanical Traction:         mins  14352;     Timed Treatment:   " 29   mins   Total Treatment:     29   mins    Electronically Signed:  Arpita Burr PTA  Physical Therapist Assistant    [place] PTA license [id number]

## 2023-09-27 ENCOUNTER — TREATMENT (OUTPATIENT)
Dept: PHYSICAL THERAPY | Facility: CLINIC | Age: 68
End: 2023-09-27
Payer: MEDICARE

## 2023-09-27 DIAGNOSIS — R26.89 BALANCE DISORDER: ICD-10-CM

## 2023-09-27 DIAGNOSIS — R29.898 WEAKNESS OF BOTH LOWER EXTREMITIES: ICD-10-CM

## 2023-09-27 DIAGNOSIS — M54.50 CHRONIC BILATERAL LOW BACK PAIN, UNSPECIFIED WHETHER SCIATICA PRESENT: Primary | ICD-10-CM

## 2023-09-27 DIAGNOSIS — R26.9 GAIT DISTURBANCE: ICD-10-CM

## 2023-09-27 DIAGNOSIS — G89.29 CHRONIC BILATERAL LOW BACK PAIN, UNSPECIFIED WHETHER SCIATICA PRESENT: Primary | ICD-10-CM

## 2023-09-27 PROCEDURE — 97530 THERAPEUTIC ACTIVITIES: CPT | Performed by: PHYSICAL THERAPIST

## 2023-09-27 PROCEDURE — 97110 THERAPEUTIC EXERCISES: CPT | Performed by: PHYSICAL THERAPIST

## 2023-09-27 NOTE — PROGRESS NOTES
"Physical Therapy Daily Treatment Note  [place] [facility] [address] [place] [address]    Patient: Ro Connor   : 1955  Referring practitioner: MAYUR Leigh  Date of Initial Visit: Type: THERAPY  Noted: 2023  Today's Date: 2023  Patient seen for 6 sessions           Subjective  Ro Connor reports: she has no difficulty after last session here. \"Feeling the normal body aches, it rained.\"     After session, \"about 4-5/10 when it spasms, Just standing here it is about 3/10,  my knees hurt, my hips hurt, low back.\"    Objective   Observed L knee to give out a little causing Ro to hold to the counter to recover. She advised that this happens B at times.    See Exercise, Manual, and Modality Logs for complete treatment.       Assessment/Plan  Ro is showing progression with her exercises each session, revealing improved endurance and gross strength. Therapist directed intervention remains necessary to further her recovery for best possible outcome.     Visit Diagnoses:    ICD-10-CM ICD-9-CM   1. Chronic bilateral low back pain, unspecified whether sciatica present  M54.50 724.2    G89.29 338.29   2. Balance disorder  R26.89 781.99   3. Gait disturbance  R26.9 781.2   4. Weakness of both lower extremities  R29.898 729.89       Progress per Plan of Care and Progress strengthening /stabilization /functional activity           Timed:  Manual Therapy:         mins  13975;  Therapeutic Exercise:    15     mins  92794;     Neuromuscular Coy:        mins  12848;    Therapeutic Activity:     15     mins  03004;     Gait Training:           mins  76561;     Ultrasound:          mins  28492;    Electrical Stimulation:         mins  51018 ( );  Aquatics  __   mins   27046    Untimed:  Electrical Stimulation:         mins  97442 ( );  Mechanical Traction:         mins  30084;     Timed Treatment:   30   mins   Total Treatment:     30   mins    Electronically " Signed:  Arpita Burr PTA  Physical Therapist Assistant    [place] PTA license [id number]

## 2023-10-02 ENCOUNTER — TREATMENT (OUTPATIENT)
Dept: PHYSICAL THERAPY | Facility: CLINIC | Age: 68
End: 2023-10-02
Payer: MEDICARE

## 2023-10-02 DIAGNOSIS — G89.29 CHRONIC BILATERAL LOW BACK PAIN, UNSPECIFIED WHETHER SCIATICA PRESENT: Primary | ICD-10-CM

## 2023-10-02 DIAGNOSIS — R26.89 BALANCE DISORDER: ICD-10-CM

## 2023-10-02 DIAGNOSIS — M54.50 CHRONIC BILATERAL LOW BACK PAIN, UNSPECIFIED WHETHER SCIATICA PRESENT: Primary | ICD-10-CM

## 2023-10-02 DIAGNOSIS — R26.9 GAIT DISTURBANCE: ICD-10-CM

## 2023-10-02 DIAGNOSIS — R29.898 WEAKNESS OF BOTH LOWER EXTREMITIES: ICD-10-CM

## 2023-10-02 PROCEDURE — 97110 THERAPEUTIC EXERCISES: CPT | Performed by: PHYSICAL THERAPIST

## 2023-10-02 PROCEDURE — 97530 THERAPEUTIC ACTIVITIES: CPT | Performed by: PHYSICAL THERAPIST

## 2023-10-02 NOTE — PROGRESS NOTES
"Physical Therapy Daily Treatment Note  [place] [facility] [address] [place] [address]    Patient: Ro Connor   : 1955  Referring practitioner: MAYUR Leigh  Date of Initial Visit: Type: THERAPY  Noted: 2023  Today's Date: 10/2/2023  Patient seen for 7 sessions           Subjective  Ro Connor reports: she does feel her endurance has improved as has her ability to do up/down stairs at home. \"Probably 4/10 when I came in, lower back between 4-5/10 and my knees/hips 4/10. On the bike it went up to 6/10.\"       Objective   Observed Ro to buckle with R knee as she walked to half wall, close SBA to get to half wall.    See Exercise, Manual, and Modality Logs for complete treatment.       Assessment/Plan  Ro is making perceived progress as noted in subjective. Ro is to undergo progress note next visit to determine gains made with therapy thus far and continued need for therapist directed intervention.     Visit Diagnoses:    ICD-10-CM ICD-9-CM   1. Chronic bilateral low back pain, unspecified whether sciatica present  M54.50 724.2    G89.29 338.29   2. Balance disorder  R26.89 781.99   3. Gait disturbance  R26.9 781.2   4. Weakness of both lower extremities  R29.898 729.89       Progress per Plan of Care and Progress strengthening /stabilization /functional activity           Timed:  Manual Therapy:         mins  11675;  Therapeutic Exercise:    14     mins  06045;     Neuromuscular Coy:        mins  22552;    Therapeutic Activity:     16     mins  55663;     Gait Training:           mins  20103;     Ultrasound:          mins  68804;    Electrical Stimulation:         mins  46705 ( );  Aquatics  __   mins   38441    Untimed:  Electrical Stimulation:         mins  70753 ( );  Mechanical Traction:         mins  35386;     Timed Treatment:   30   mins   Total Treatment:     30   mins    Electronically Signed:  Arpita Burr PTA  Physical Therapist Assistant "    [place] PTA license [id number]

## 2023-10-03 RX ORDER — HYDROXYZINE 50 MG/1
TABLET, FILM COATED ORAL
Qty: 270 TABLET | Refills: 0 | Status: SHIPPED | OUTPATIENT
Start: 2023-10-03

## 2023-10-06 ENCOUNTER — TREATMENT (OUTPATIENT)
Dept: PHYSICAL THERAPY | Facility: CLINIC | Age: 68
End: 2023-10-06
Payer: MEDICARE

## 2023-10-06 DIAGNOSIS — R26.89 BALANCE DISORDER: ICD-10-CM

## 2023-10-06 DIAGNOSIS — R26.9 GAIT DISTURBANCE: ICD-10-CM

## 2023-10-06 DIAGNOSIS — M54.50 CHRONIC BILATERAL LOW BACK PAIN, UNSPECIFIED WHETHER SCIATICA PRESENT: Primary | ICD-10-CM

## 2023-10-06 DIAGNOSIS — G89.29 CHRONIC BILATERAL LOW BACK PAIN, UNSPECIFIED WHETHER SCIATICA PRESENT: Primary | ICD-10-CM

## 2023-10-06 DIAGNOSIS — R29.898 WEAKNESS OF BOTH LOWER EXTREMITIES: ICD-10-CM

## 2023-10-06 PROCEDURE — 97110 THERAPEUTIC EXERCISES: CPT | Performed by: PHYSICAL THERAPIST

## 2023-10-06 PROCEDURE — 97530 THERAPEUTIC ACTIVITIES: CPT | Performed by: PHYSICAL THERAPIST

## 2023-10-06 NOTE — PROGRESS NOTES
Progress Assessment  [address] [place] [address]        Patient: Ro Connor   : 1955  Diagnosis/ICD-10 Code:  No primary diagnosis found.  Referring practitioner: MAYUR Leigh  Date of Initial Visit: No linked episodes  Today's Date: 10/6/2023  Patient seen for Visit count could not be calculated. Make sure you are using a visit which is associated with an episode. sessions      Subjective:   Ro Connor reports: 4/10 in B knees today    Subjective Questionnaire: 30 seconds STS: without UE support - table all the way down: 16 times      TU.3 seconds with no AD    Clinical Progress: improved  Home Program Compliance: Yes  Treatment has included: therapeutic exercise, neuromuscular re-education, manual therapy, therapeutic activity, and gait training    Subjective Pt reports she feels like therapy has really helped her get stronger. Pt reports she has had one fall in the last month where she turned and lost her balance. Pt reports her greatest deficits is L LE strength, balance, and stairs. Pt reports she is getting much better at going up and down the stairs with one handrail but reports she still has to think about her technique.     Objective     Strength/Myotome Testing      Left Hip   Planes of Motion   Flexion: 4  Extension: 4-  Abduction: 4-  Adduction: 4-     Right Hip   Planes of Motion   Flexion: 4+  Extension: 4  Abduction: 4  Adduction: 4     Left Knee   Flexion: 4+  Extension: 5     Right Knee   Flexion: 4+  Extension: 5     Left Ankle/Foot   Dorsiflexion: 5     Right Ankle/Foot   Dorsiflexion: 5      Comments  Feet together with EO: 30 seconds  Feet together with EC: 6 seconds   Tandem R foot in front: 4 seconds  Tandem L foot in front: 4 seconds      Pt is able to ascend/descend 4 steps without STC with leading up with R with step to pattern.    Assessment/Plan    Pt has made wonderful progress over the last month of therapy has has made significant  progress with her functional tests, TUG and 30 second STS. Pt has met both goals for these tests. Pt does still have significant decrease in balance, specifically with tandem and feet together EC. Pt has made progress with improving strength but does still have deficits with L LE being weaker then R LE. Pt requires skilled PT in order to address deficits listed and to return patient back to maximum function and decrease patient's risk for falls. Will continue to progress as able.       Goals  Plan Goals: LUMBAR PROBLEMS:     1. The patient has complaints of pain.     LTG 1: 12 weeks: The patient will report lower back pain no greater than 3/10 in order to improve tolerance with activities of daily living and improve sleep quality.     STATUS: IN PROGRESS     STG 1a: 6 weeks: The patient will report lower back pain no greater than 5/10.      STATUS: MET     2. The patient demonstrates weakness of the bilateral hip.     LTG 2: 12 weeks: The patient will demonstrate 4+/5 strength for bilateral hip flexion, abduction, and extension in order to improve hip stability.     STATUS:  IN PROGRESS        STG 2a: 6 weeks: The patient will demonstrate 4/5 strength for bilateral hip flexion, abduction, and extension.     STATUS:  IN PROGRESS        3. The patient has limited strength of the bilateral knee.     LTG 3: 12 weeks: The patient will demonstrate 5/5 strength for bilateral knee flexion and extension in order to allow patient improved joint stability.     STATUS: MET     STG 3a: 6 weeks: The patient will demonstrate 4+/5 strength for bilateral knee flexion and extension     STATUS: MET        4. The patient has gait dysfunction.     LTG 4: 12 weeks: The patient will ambulate without assistive device, independently, for community distances with minimal trendelenburg gait in order to improve mobility and allow patient to perform activities such as grocery shopping with greater ease.     STATUS:  IN PROGRESS     BALANCE      1. Mobility: Walking/Moving Around Functional Limitation                            LTG 1: 12 weeks:  The patient will demonstrate TUG score <12 seconds for reduced fall risk.                             STATUS:  MET     STG 1a: 6 weeks:  The patient will demonstrate TUG score <13 seconds for reduced fall risk.                             STATUS:  MET         2. The patient has difficulty with balance.                    LTG 2: 12 weeks: The patient will hold the sharpened romberg position with eyes open for 20 seconds in order to improve balance and decrease risk of falling.      STATUS:  IN PROGRESS     STG 2: 6 weeks: The patient will hold the feet together position with eyes opened for 10 seconds in order to improve balance and decrease risk of falling.                             STATUS:  IN PROGRESS    Progress toward previous goals: Partially Met    See Exercise, Manual, and Modality Logs for complete treatment.         Recommendations: Continue as planned  Timeframe:2x a week for 1 month  Prognosis to achieve goals: good    PT SIGNATURE: Electronically signed by Gelacio Chaves, PT  [place] LICENSE: [id number]    Based upon review of the patient's progress and continued therapy plan, it is my medical opinion that Ro Connor should continue physical therapy treatment at [facility] PHYSICAL THERAPY  [address] [place] [id number] [phone].      Timed:                 Manual Therapy:    0     mins  14701;     Therapeutic Exercise:    15     mins  36903;     Neuromuscular Coy:    5    mins  55845;    Therapeutic Activity:     10     mins  29905;     Gait Trainin     mins  53810;     Ultrasound:     0     mins  41885;    Ionto                               0    mins   87564  Self pay                         0     mins [email or web address]    Un-Timed:  Electrical Stimulation:    0     mins  38570 ( )  Canalith Repos    0     mins 19442  Dry Needling     0     mins  self-pay  Traction     0     mins 56116    Timed Treatment:   30   mins   Total Treatment:     30   mins      I certify that the therapy services are furnished while this patient is under my care.  The services outlined above are required by this patient, and will be reviewed every 90 days.

## 2023-10-09 ENCOUNTER — TREATMENT (OUTPATIENT)
Dept: PHYSICAL THERAPY | Facility: CLINIC | Age: 68
End: 2023-10-09
Payer: MEDICARE

## 2023-10-09 DIAGNOSIS — G89.29 CHRONIC BILATERAL LOW BACK PAIN, UNSPECIFIED WHETHER SCIATICA PRESENT: Primary | ICD-10-CM

## 2023-10-09 DIAGNOSIS — R29.898 WEAKNESS OF BOTH LOWER EXTREMITIES: ICD-10-CM

## 2023-10-09 DIAGNOSIS — M54.50 CHRONIC BILATERAL LOW BACK PAIN, UNSPECIFIED WHETHER SCIATICA PRESENT: Primary | ICD-10-CM

## 2023-10-09 DIAGNOSIS — R26.9 GAIT DISTURBANCE: ICD-10-CM

## 2023-10-09 DIAGNOSIS — R26.89 BALANCE DISORDER: ICD-10-CM

## 2023-10-09 PROCEDURE — 97112 NEUROMUSCULAR REEDUCATION: CPT | Performed by: PHYSICAL THERAPIST

## 2023-10-09 PROCEDURE — 97530 THERAPEUTIC ACTIVITIES: CPT | Performed by: PHYSICAL THERAPIST

## 2023-10-09 NOTE — PROGRESS NOTES
Physical Therapy Daily Note  [address] [place] [address]    VISIT#: [id number]    Subjective   Ro Connor reports 3/10 pain in low back and 4/10 in B knee pain today. Pt presents to PT with no AD for ambulation today.       Objective     See Exercise, Manual, and Modality Logs for complete treatment.     Assessment/Plan    Progressed all of patient's exercises today and patient tolerated well. Performed more balance exercises today with emphasis on performing quick SL balance to improve patient's ability to perform stairs. Pt did have increase in L piriformis pain with SL stance and patient had one instance of L knee slightly buckling. As patient continued to perform balance exercises her LOB decreased. Will continue to progress patient as able.     Progress per Plan of Care and Progress strengthening /stabilization /functional activity            Timed:                 Manual Therapy:    0     mins  56945;     Therapeutic Exercise:    5     mins  25339;     Neuromuscular Coy:    10    mins  74726;    Therapeutic Activity:     15     mins  22069;     Gait Trainin     mins  80250;     Ultrasound:     0     mins  09869;    Ionto                               0    mins   09323  Self pay                         0     mins [email or web address]    Un-Timed:  Electrical Stimulation:    0     mins  50696 ( )  Canalith Repos    0     mins 46344  Dry Needling     0     mins self-pay  Traction     0     mins 58993    Timed Treatment:   30   mins   Total Treatment:     30   mins    Gelacio Chaves PT  Physical Therapist    PT SIGNATURE: Electronically signed by Gelacio Chaves PT  [place] LICENSE: [id number]

## 2023-10-12 ENCOUNTER — TREATMENT (OUTPATIENT)
Dept: PHYSICAL THERAPY | Facility: CLINIC | Age: 68
End: 2023-10-12
Payer: MEDICARE

## 2023-10-12 DIAGNOSIS — G89.29 CHRONIC BILATERAL LOW BACK PAIN, UNSPECIFIED WHETHER SCIATICA PRESENT: Primary | ICD-10-CM

## 2023-10-12 DIAGNOSIS — R29.898 WEAKNESS OF BOTH LOWER EXTREMITIES: ICD-10-CM

## 2023-10-12 DIAGNOSIS — M54.50 CHRONIC BILATERAL LOW BACK PAIN, UNSPECIFIED WHETHER SCIATICA PRESENT: Primary | ICD-10-CM

## 2023-10-12 DIAGNOSIS — R26.89 BALANCE DISORDER: ICD-10-CM

## 2023-10-12 DIAGNOSIS — R26.9 GAIT DISTURBANCE: ICD-10-CM

## 2023-10-12 DIAGNOSIS — F33.0 MAJOR DEPRESSIVE DISORDER, RECURRENT, MILD: ICD-10-CM

## 2023-10-12 PROCEDURE — 97110 THERAPEUTIC EXERCISES: CPT | Performed by: PHYSICAL THERAPIST

## 2023-10-12 PROCEDURE — 97530 THERAPEUTIC ACTIVITIES: CPT | Performed by: PHYSICAL THERAPIST

## 2023-10-12 RX ORDER — BUPROPION HYDROCHLORIDE 150 MG/1
150 TABLET ORAL DAILY
Qty: 90 TABLET | Refills: 1 | Status: SHIPPED | OUTPATIENT
Start: 2023-10-12

## 2023-10-12 NOTE — PROGRESS NOTES
"Physical Therapy Daily Treatment Note  [place] [facility] [address] [place] [address]    Patient: Ro Connor   : 1955  Referring practitioner: MAYUR Leigh  Date of Initial Visit: Type: THERAPY  Noted: 2023  Today's Date: 10/12/2023  Patient seen for 10 sessions           Subjective  Ro Connor reports: \"Not feeling so good, weather coming in.\" Ro noted pain in her joints all over. Ro noted difficulty making a fist due to finger discomfort.       Objective   See Exercise, Manual, and Modality Logs for complete treatment.       Assessment/Plan  Ro moved slowly today, increased trendelenburg gait pattern owing to overall pain from arthritis. Continued need for therapist directed intervention for best possible outcome related to LBP, strength and balance deficits.    Visit Diagnoses:    ICD-10-CM ICD-9-CM   1. Chronic bilateral low back pain, unspecified whether sciatica present  M54.50 724.2    G89.29 338.29   2. Gait disturbance  R26.9 781.2   3. Balance disorder  R26.89 781.99   4. Weakness of both lower extremities  R29.898 729.89       Progress per Plan of Care and Progress strengthening /stabilization /functional activity           Timed:  Manual Therapy:         mins  94944;  Therapeutic Exercise:    10     mins  02559;     Neuromuscular Coy:        mins  74681;    Therapeutic Activity:     16     mins  94414;     Gait Training:           mins  49696;     Ultrasound:          mins  81249;    Electrical Stimulation:         mins  61504 ( );  Aquatics  __   mins   83216    Untimed:  Electrical Stimulation:         mins  92155 ( );  Mechanical Traction:         mins  75111;     Timed Treatment:   26   mins   Total Treatment:     26   mins    Electronically Signed:  Arpita Burr PTA  Physical Therapist Assistant    [place] PTA license [id number]            "

## 2023-10-12 NOTE — TELEPHONE ENCOUNTER
Caller: Nikolas Ro CASSIE    Relationship: Self    Best call back number:    [phone]      Requested Prescriptions:   Requested Prescriptions     Pending Prescriptions Disp Refills    buPROPion XL (WELLBUTRIN XL) 150 MG 24 hr tablet 30 tablet 0     Sig: Take 1 tablet by mouth Daily.      Pharmacy where request should be sent: [address] [phone] [facility] [phone] FX     Last office visit with prescribing clinician: 1/20/2023   Last telemedicine visit with prescribing clinician: Visit date not found   Next office visit with prescribing clinician: Visit date not found     Additional details provided by patient: PATIENT IS OUT OF MEDICATION.     Does the patient have less than a 3 day supply:  [] Yes  [] No    Would you like a call back once the refill request has been completed: [] Yes [] No    If the office needs to give you a call back, can they leave a voicemail: [] Yes [] No    Faby Wilhelm Rep   10/12/23 13:57 EDT

## 2023-10-17 RX ORDER — SIMVASTATIN 40 MG
TABLET ORAL
Qty: 90 TABLET | Refills: 0 | Status: SHIPPED | OUTPATIENT
Start: 2023-10-17

## 2023-10-17 RX ORDER — FERROUS SULFATE 325(65) MG
TABLET ORAL
Qty: 90 TABLET | Refills: 0 | Status: SHIPPED | OUTPATIENT
Start: 2023-10-17

## 2023-10-19 ENCOUNTER — TREATMENT (OUTPATIENT)
Dept: PHYSICAL THERAPY | Facility: CLINIC | Age: 68
End: 2023-10-19
Payer: MEDICARE

## 2023-10-19 DIAGNOSIS — G89.29 CHRONIC BILATERAL LOW BACK PAIN, UNSPECIFIED WHETHER SCIATICA PRESENT: Primary | ICD-10-CM

## 2023-10-19 DIAGNOSIS — R29.898 WEAKNESS OF BOTH LOWER EXTREMITIES: ICD-10-CM

## 2023-10-19 DIAGNOSIS — R26.89 BALANCE DISORDER: ICD-10-CM

## 2023-10-19 DIAGNOSIS — R26.9 GAIT DISTURBANCE: ICD-10-CM

## 2023-10-19 DIAGNOSIS — M54.50 CHRONIC BILATERAL LOW BACK PAIN, UNSPECIFIED WHETHER SCIATICA PRESENT: Primary | ICD-10-CM

## 2023-10-19 PROCEDURE — 97112 NEUROMUSCULAR REEDUCATION: CPT | Performed by: PHYSICAL THERAPIST

## 2023-10-19 PROCEDURE — 97110 THERAPEUTIC EXERCISES: CPT | Performed by: PHYSICAL THERAPIST

## 2023-10-19 NOTE — PROGRESS NOTES
Outpatient Physical Therapy  [address] [facility], [place] [address]                            Physical Therapy Daily Treatment Note    Patient: Ro Connor   : 1955  Diagnosis/ICD-10 Code:  Chronic bilateral low back pain, unspecified whether sciatica present [M54.50, G89.29]  Referring practitioner: MAYUR Leigh  Date of Initial Visit: Type: THERAPY  Noted: 2023  Today's Date: 10/19/2023  Patient seen for 11 sessions           Subjective   Ro Connor reports: that low back is still a little achy.     Objective   Minimal discomfort in R shoulder with PNF pattern    See Exercise, Manual, and Modality Logs for complete treatment.     Assessment/Plan  Ro still experiencing increased low back  pain, especially with cold weather. Pt had some increased discomfort in R shoulder with PNF pattern. Pt would benefit from skilled PT to address Range of Motion  and Strength deficits, pain management and any concerns with ADLs.       Progress per Plan of Care         Timed:  Manual Therapy:         mins  48949;  Therapeutic Exercise:    20     mins  29640;     Neuromuscular Coy:    10    mins  50743;    Therapeutic Activity:          mins  91226;     Gait Training:           mins  25755;    Aquatic Therapy:          mins  74765;       Untimed:  Electrical Stimulation:         mins  16727 ( );  Mechanical Traction:         mins  62132;       Timed Treatment:   30   mins   Total Treatment:     30   mins      Electronically signed:     Tricia Stewart PTA  Physical Therapist Assistant  [facility] [place] License #: [id number]

## 2023-10-23 ENCOUNTER — TELEPHONE (OUTPATIENT)
Dept: INTERNAL MEDICINE | Facility: CLINIC | Age: 68
End: 2023-10-23
Payer: MEDICARE

## 2023-10-23 ENCOUNTER — TREATMENT (OUTPATIENT)
Dept: PHYSICAL THERAPY | Facility: CLINIC | Age: 68
End: 2023-10-23
Payer: MEDICARE

## 2023-10-23 DIAGNOSIS — M54.50 CHRONIC BILATERAL LOW BACK PAIN, UNSPECIFIED WHETHER SCIATICA PRESENT: Primary | ICD-10-CM

## 2023-10-23 DIAGNOSIS — R29.898 WEAKNESS OF BOTH LOWER EXTREMITIES: ICD-10-CM

## 2023-10-23 DIAGNOSIS — R26.9 GAIT DISTURBANCE: ICD-10-CM

## 2023-10-23 DIAGNOSIS — R26.89 BALANCE DISORDER: ICD-10-CM

## 2023-10-23 DIAGNOSIS — G89.29 CHRONIC BILATERAL LOW BACK PAIN, UNSPECIFIED WHETHER SCIATICA PRESENT: Primary | ICD-10-CM

## 2023-10-23 PROCEDURE — 97112 NEUROMUSCULAR REEDUCATION: CPT | Performed by: PHYSICAL THERAPIST

## 2023-10-23 PROCEDURE — 97530 THERAPEUTIC ACTIVITIES: CPT | Performed by: PHYSICAL THERAPIST

## 2023-10-23 RX ORDER — PANTOPRAZOLE SODIUM 40 MG/1
40 TABLET, DELAYED RELEASE ORAL DAILY
Qty: 30 TABLET | Refills: 2 | Status: SHIPPED | OUTPATIENT
Start: 2023-10-23 | End: 2024-01-21

## 2023-10-23 NOTE — TELEPHONE ENCOUNTER
Caller: Ro Connor    Relationship: Self    Best call back number: [phone]    Requested Prescriptions:   Requested Prescriptions     Pending Prescriptions Disp Refills    pantoprazole (Protonix) 40 MG EC tablet 90 tablet 1     Sig: Take 1 tablet by mouth Daily for 90 days.        Pharmacy where request should be sent: [address] [phone] [facility] [phone] FX     Last office visit with prescribing clinician: 1/20/2023   Last telemedicine visit with prescribing clinician: Visit date not found   Next office visit with prescribing clinician: Visit date not found     Additional details provided by patient: MEDICATION ORIGINALLY PRESCRIBED BY PATRICE STRAUSS     Does the patient have less than a 3 day supply:  [x] Yes  [] No    Would you like a call back once the refill request has been completed: [] Yes [] No    If the office needs to give you a call back, can they leave a voicemail: [] Yes [] No    Faby Almanza Rep   10/23/23 14:07 EDT

## 2023-10-23 NOTE — PROGRESS NOTES
"Physical Therapy Daily Treatment Note  [place] [facility] [address] [place] [address]    Patient: Ro Connor   : 1955  Referring practitioner: MAYUR Leigh  Date of Initial Visit: Type: THERAPY  Noted: 2023  Today's Date: 10/23/2023  Patient seen for 12 sessions           Subjective  Ro Connor reports: at arrival, \"Not bad today, -2/10, it is warm outside.\"       Objective   See Exercise, Manual, and Modality Logs for complete treatment.       Assessment/Plan  One episode of L hip giving out as pt was completing step overs, required CGA and counter to regain balance. Ro tolerated additional repetitions during session today, commenting she felt she had a good workout.  Continued therapist directed intervention required to attend to best possible outcome.    Visit Diagnoses:    ICD-10-CM ICD-9-CM   1. Chronic bilateral low back pain, unspecified whether sciatica present  M54.50 724.2    G89.29 338.29   2. Gait disturbance  R26.9 781.2   3. Balance disorder  R26.89 781.99   4. Weakness of both lower extremities  R29.898 729.89       Progress per Plan of Care and Progress strengthening /stabilization /functional activity           Timed:  Manual Therapy:         mins  42535;  Therapeutic Exercise:         mins  98603;     Neuromuscular Coy:    10    mins  88975;    Therapeutic Activity:     20     mins  10297;     Gait Training:           mins  30085;     Ultrasound:          mins  82913;    Electrical Stimulation:         mins  24339 ( );  Aquatics  __   mins   64422    Untimed:  Electrical Stimulation:         mins  69602 ( );  Mechanical Traction:         mins  32181;     Timed Treatment:   30   mins   Total Treatment:     30   mins    Electronically Signed:  Arpita Burr PTA  Physical Therapist Assistant    [place] PTA license [id number]            "

## 2023-10-26 ENCOUNTER — TREATMENT (OUTPATIENT)
Dept: PHYSICAL THERAPY | Facility: CLINIC | Age: 68
End: 2023-10-26
Payer: MEDICARE

## 2023-10-26 DIAGNOSIS — G89.29 CHRONIC BILATERAL LOW BACK PAIN, UNSPECIFIED WHETHER SCIATICA PRESENT: Primary | ICD-10-CM

## 2023-10-26 DIAGNOSIS — R29.898 WEAKNESS OF BOTH LOWER EXTREMITIES: ICD-10-CM

## 2023-10-26 DIAGNOSIS — M54.50 CHRONIC BILATERAL LOW BACK PAIN, UNSPECIFIED WHETHER SCIATICA PRESENT: Primary | ICD-10-CM

## 2023-10-26 DIAGNOSIS — R26.9 GAIT DISTURBANCE: ICD-10-CM

## 2023-10-26 DIAGNOSIS — R26.89 BALANCE DISORDER: ICD-10-CM

## 2023-10-26 NOTE — PROGRESS NOTES
"Physical Therapy Daily Treatment Note  [place] [facility] [address] [place] [address]    Patient: Ro Connor   : 1955  Referring practitioner: MAYUR Leigh  Date of Initial Visit: Type: THERAPY  Noted: 2023  Today's Date: 10/26/2023  Patient seen for 13 sessions           Subjective  Ro Connor reports: her back is hurting her today.     Ro stated, \"this is helping. My sister even commented II am walking better and faster.\"    Objective   See Exercise, Manual, and Modality Logs for complete treatment.       Assessment/Plan  Ro required a few rests during session due to back c/o but she tolerated additional exercises. oR is noticing improved strength and functional ability per her comments. Therapist directed intervention required to further address deficits remaining.    Visit Diagnoses:    ICD-10-CM ICD-9-CM   1. Chronic bilateral low back pain, unspecified whether sciatica present  M54.50 724.2    G89.29 338.29   2. Gait disturbance  R26.9 781.2   3. Balance disorder  R26.89 781.99   4. Weakness of both lower extremities  R29.898 729.89       Progress per Plan of Care and Progress strengthening /stabilization /functional activity           Timed:  Manual Therapy:         mins  51405;  Therapeutic Exercise:    14     mins  51860;     Neuromuscular Coy:    10    mins  85797;    Therapeutic Activity:     14     mins  94727;     Gait Training:           mins  07886;     Ultrasound:          mins  13307;    Electrical Stimulation:         mins  36202 ( );  Aquatics  __   mins   11109    Untimed:  Electrical Stimulation:         mins  20671 ( );  Mechanical Traction:         mins  28622;     Timed Treatment:   38   mins   Total Treatment:     38   mins    Electronically Signed:  Arpita Burr PTA  Physical Therapist Assistant    [place] PTA license [id number]            "

## 2023-10-30 ENCOUNTER — TREATMENT (OUTPATIENT)
Dept: PHYSICAL THERAPY | Facility: CLINIC | Age: 68
End: 2023-10-30
Payer: MEDICARE

## 2023-10-30 DIAGNOSIS — R29.898 WEAKNESS OF BOTH LOWER EXTREMITIES: ICD-10-CM

## 2023-10-30 DIAGNOSIS — M54.50 CHRONIC BILATERAL LOW BACK PAIN, UNSPECIFIED WHETHER SCIATICA PRESENT: Primary | ICD-10-CM

## 2023-10-30 DIAGNOSIS — R26.89 BALANCE DISORDER: ICD-10-CM

## 2023-10-30 DIAGNOSIS — R26.9 GAIT DISTURBANCE: ICD-10-CM

## 2023-10-30 DIAGNOSIS — G89.29 CHRONIC BILATERAL LOW BACK PAIN, UNSPECIFIED WHETHER SCIATICA PRESENT: Primary | ICD-10-CM

## 2023-10-30 PROCEDURE — 97530 THERAPEUTIC ACTIVITIES: CPT | Performed by: PHYSICAL THERAPIST

## 2023-10-30 PROCEDURE — 97112 NEUROMUSCULAR REEDUCATION: CPT | Performed by: PHYSICAL THERAPIST

## 2023-10-30 NOTE — PROGRESS NOTES
"Physical Therapy Daily Treatment Note  [place] [facility] [address] [place] [address]    Patient: Ro Connor   : 1955  Referring practitioner: MAYUR Leigh  Date of Initial Visit: Type: THERAPY  Noted: 2023  Today's Date: 10/30/2023  Patient seen for 14 sessions           Subjective  Ro Connor reports: her back and R shoulder are bothering her. Back at 4/10 and her R shoulder 3/10. She attributes this to weather.       Objective   Ro further indicated back pain at LB and into L buttock/hip.    See Exercise, Manual, and Modality Logs for complete treatment.       Assessment/Plan  Ro is showing improved gross strength as evident with tolerance of increased resistance and repetitions used, refer to flow sheet. \"I know that I am getting better,\" she stated . Continue as per POC.    Visit Diagnoses:    ICD-10-CM ICD-9-CM   1. Chronic bilateral low back pain, unspecified whether sciatica present  M54.50 724.2    G89.29 338.29   2. Gait disturbance  R26.9 781.2   3. Balance disorder  R26.89 781.99   4. Weakness of both lower extremities  R29.898 729.89       Progress per Plan of Care and Progress strengthening /stabilization /functional activity           Timed:  Manual Therapy:         mins  90837;  Therapeutic Exercise:    5     mins  91490;     Neuromuscular Coy:    10    mins  13683;    Therapeutic Activity:    13      mins  35670;     Gait Training:           mins  20563;     Ultrasound:          mins  05480;    Electrical Stimulation:         mins  60576 ( );  Aquatics  __   mins   84373    Untimed:  Electrical Stimulation:         mins  32316 ( );  Mechanical Traction:         mins  11968;     Timed Treatment:   28   mins   Total Treatment:     28   mins    Electronically Signed:  Arpita Burr PTA  Physical Therapist Assistant    [place] license [id number]            "

## 2023-11-03 ENCOUNTER — TREATMENT (OUTPATIENT)
Dept: PHYSICAL THERAPY | Facility: CLINIC | Age: 68
End: 2023-11-03
Payer: MEDICARE

## 2023-11-03 DIAGNOSIS — M54.50 CHRONIC BILATERAL LOW BACK PAIN, UNSPECIFIED WHETHER SCIATICA PRESENT: Primary | ICD-10-CM

## 2023-11-03 DIAGNOSIS — G89.29 CHRONIC BILATERAL LOW BACK PAIN, UNSPECIFIED WHETHER SCIATICA PRESENT: Primary | ICD-10-CM

## 2023-11-03 DIAGNOSIS — R26.89 BALANCE DISORDER: ICD-10-CM

## 2023-11-03 DIAGNOSIS — R26.9 GAIT DISTURBANCE: ICD-10-CM

## 2023-11-03 PROCEDURE — 97530 THERAPEUTIC ACTIVITIES: CPT | Performed by: PHYSICAL THERAPIST

## 2023-11-03 PROCEDURE — 97110 THERAPEUTIC EXERCISES: CPT | Performed by: PHYSICAL THERAPIST

## 2023-11-03 NOTE — PROGRESS NOTES
Progress Assessment  [address] [place] [id number]        Patient: Ro Connor   : 1955  Diagnosis/ICD-10 Code:  Chronic bilateral low back pain, unspecified whether sciatica present [M54.50, G89.29]  Referring practitioner: MAYUR Leigh  Date of Initial Visit: Type: THERAPY  Noted: 2023  Today's Date: 11/3/2023  Patient seen for 15 sessions      Subjective:   Ro Connor reports: 4/10  Subjective Questionnaire: Optimal: 36/  Clinical Progress: improved  Home Program Compliance: Yes  Treatment has included: therapeutic exercise, neuromuscular re-education, manual therapy, therapeutic activity, and gait training    Subjective Pt reports she feels like therapy has greatly helped her and she feels stronger. Pt reports that even her family notices the improvement patient has made.     Objective     Strength/Myotome Testing      Left Hip   Planes of Motion   Flexion: 4+  Extension: 4+  Abduction: 4+  Adduction: 4+     Right Hip   Planes of Motion   Flexion: 5  Extension: 5  Abduction: 5  Adduction: 5     Left Knee   Flexion: 5  Extension: 5     Right Knee   Flexion: 5  Extension: 5     Left Ankle/Foot   Dorsiflexion: 5     Right Ankle/Foot   Dorsiflexion: 5        Comments  Feet together with EO: 30 seconds  Feet together with EC: 10 seconds   Tandem R foot in front: 20 seconds  Tandem L foot in front: 20 seconds     Assessment/Plan    Pt has made excellent progress with improving strength and balance since last progress note. Pt has met all goals for PT at this time except long term pain goal. Due to patient's excellent progress and compliance with HEP, will be discharging patient at this time. Discussed with patient to continue with exercises in order to continue to improve and maintain balance and strength. Discussed with patient to call if she has any questions.     Goals  Plan Goals: LUMBAR PROBLEMS:     1. The patient has complaints of pain.     LTG 1: 12 weeks: The  patient will report lower back pain no greater than 3/10 in order to improve tolerance with activities of daily living and improve sleep quality.     STATUS: NOT MET     STG 1a: 6 weeks: The patient will report lower back pain no greater than 5/10.      STATUS: MET     2. The patient demonstrates weakness of the bilateral hip.     LTG 2: 12 weeks: The patient will demonstrate 4+/5 strength for bilateral hip flexion, abduction, and extension in order to improve hip stability.     STATUS: MET        STG 2a: 6 weeks: The patient will demonstrate 4/5 strength for bilateral hip flexion, abduction, and extension.     STATUS: MET        3. The patient has limited strength of the bilateral knee.     LTG 3: 12 weeks: The patient will demonstrate 5/5 strength for bilateral knee flexion and extension in order to allow patient improved joint stability.     STATUS: MET     STG 3a: 6 weeks: The patient will demonstrate 4+/5 strength for bilateral knee flexion and extension     STATUS: MET        4. The patient has gait dysfunction.     LTG 4: 12 weeks: The patient will ambulate without assistive device, independently, for community distances with minimal trendelenburg gait in order to improve mobility and allow patient to perform activities such as grocery shopping with greater ease.     STATUS:  MET     BALANCE     1. Mobility: Walking/Moving Around Functional Limitation                            LTG 1: 12 weeks:  The patient will demonstrate TUG score <12 seconds for reduced fall risk.                             STATUS:  MET     STG 1a: 6 weeks:  The patient will demonstrate TUG score <13 seconds for reduced fall risk.                             STATUS:  MET         2. The patient has difficulty with balance.                    LTG 2: 12 weeks: The patient will hold the sharpened romberg position with eyes open for 20 seconds in order to improve balance and decrease risk of falling.      STATUS:  MET    STG 2: 6 weeks: The  patient will hold the feet together position with eyes opened for 10 seconds in order to improve balance and decrease risk of falling.                             STATUS:  MET    Progress toward previous goals: All Met except long term pain goal     See Exercise, Manual, and Modality Logs for complete treatment.         Recommendations: Discharge      PT SIGNATURE: Electronically signed by Gelacio Chaves, PT  [place] LICENSE: [id number]    Based upon review of the patient's progress and continued therapy plan, it is my medical opinion that Ro Connor should continue physical therapy treatment at [facility]  [address] [place] [address]  [facility] [address] [phone].      Timed:                 Manual Therapy:    0     mins  31362;     Therapeutic Exercise:    15     mins  73623;     Neuromuscular Coy:    0    mins  02069;    Therapeutic Activity:     15     mins  90287;     Gait Trainin     mins  19193;     Ultrasound:     0     mins  81170;    Ionto                               0    mins   53303  Self pay                         0     mins [id number]    Un-Timed:  Electrical Stimulation:    0     mins  19491 ( )  Canalith Repos    0     mins 67812  Dry Needling     0     mins self-pay  Traction     0     mins 94499    Timed Treatment:   30   mins   Total Treatment:     30   mins      I certify that the therapy services are furnished while this patient is under my care.  The services outlined above are required by this patient, and will be reviewed every 90 days.

## 2023-11-15 NOTE — PATIENT INSTRUCTIONS
The patient was instructed to follow our dietary recommendations regarding a high lean protein, low carb and low fat diet. Reviewed appropriate amount of water to intake daily. Be sure to take vitamins as recommended. Patient was instructed to get at least 150 minutes of exercise weekly including both cardio and strength training.   
Yes

## 2023-11-27 ENCOUNTER — TELEPHONE (OUTPATIENT)
Dept: INTERNAL MEDICINE | Facility: CLINIC | Age: 68
End: 2023-11-27
Payer: MEDICARE

## 2023-11-27 RX ORDER — FUROSEMIDE 20 MG/1
20 TABLET ORAL DAILY
Qty: 90 TABLET | Refills: 1 | Status: SHIPPED | OUTPATIENT
Start: 2023-11-27

## 2023-11-27 RX ORDER — CARVEDILOL 3.12 MG/1
3.12 TABLET ORAL 2 TIMES DAILY WITH MEALS
Qty: 180 TABLET | Refills: 1 | Status: SHIPPED | OUTPATIENT
Start: 2023-11-27

## 2023-11-27 NOTE — TELEPHONE ENCOUNTER
Caller: Ro Connor    Relationship: Self    Best call back number: [phone]    Requested Prescriptions:   Requested Prescriptions     Pending Prescriptions Disp Refills    furosemide (LASIX) 20 MG tablet 90 tablet 1     Sig: Take 1 tablet by mouth Daily.    carvedilol (COREG) 3.125 MG tablet 180 tablet 1     Sig: Take 1 tablet by mouth 2 (Two) Times a Day With Meals.        Pharmacy where request should be sent: [address] [phone] [facility] [phone]      Last office visit with prescribing clinician: 1/20/2023   Last telemedicine visit with prescribing clinician: Visit date not found   Next office visit with prescribing clinician: Visit date not found       Does the patient have less than a 3 day supply:  [x] Yes  [] No    Would you like a call back once the refill request has been completed: [] Yes [] No    If the office needs to give you a call back, can they leave a voicemail: [] Yes [] No    Faby Almanza Rep   11/27/23 13:27 EST

## 2023-12-06 RX ORDER — CARVEDILOL 3.12 MG/1
3.12 TABLET ORAL 2 TIMES DAILY WITH MEALS
Qty: 180 TABLET | Refills: 1 | OUTPATIENT
Start: 2023-12-06

## 2024-01-18 NOTE — TELEPHONE ENCOUNTER
Caller: Amberclifflefty Ro CASSIE    Relationship: Self    Best call back number: [phone]     Requested Prescriptions:   Requested Prescriptions     Pending Prescriptions Disp Refills    pantoprazole (Protonix) 40 MG EC tablet 30 tablet 2     Sig: Take 1 tablet by mouth Daily for 90 days.    simvastatin (ZOCOR) 40 MG tablet 90 tablet 0     Sig: Take 1 tablet by mouth Every Night.    ferrous sulfate (SV Iron) 325 (65 FE) MG tablet 90 tablet 0     Sig: Take 1 tablet by mouth Daily.        Pharmacy where request should be sent:    [address] - [phone] [facility] [phone]  [phone]       Last office visit with prescribing clinician: 1/20/2023   Last telemedicine visit with prescribing clinician: Visit date not found   Next office visit with prescribing clinician: Visit date not found       Does the patient have less than a 3 day supply:  [x] Yes  [] No    Would you like a call back once the refill request has been completed: [] Yes [] No    If the office needs to give you a call back, can they leave a voicemail: [] Yes [] No    Faby Smith Rep   01/18/24 11:13 EST

## 2024-01-19 RX ORDER — PANTOPRAZOLE SODIUM 40 MG/1
40 TABLET, DELAYED RELEASE ORAL DAILY
Qty: 30 TABLET | Refills: 0 | Status: SHIPPED | OUTPATIENT
Start: 2024-01-19 | End: 2024-04-18

## 2024-01-19 RX ORDER — FERROUS SULFATE 325(65) MG
1 TABLET ORAL DAILY
Qty: 30 TABLET | Refills: 0 | Status: SHIPPED | OUTPATIENT
Start: 2024-01-19

## 2024-01-19 RX ORDER — SIMVASTATIN 40 MG
40 TABLET ORAL NIGHTLY
Qty: 30 TABLET | Refills: 0 | Status: SHIPPED | OUTPATIENT
Start: 2024-01-19

## 2024-01-19 NOTE — TELEPHONE ENCOUNTER
Attempted to call patient to schedule her an appt to come in within the next month for a med refill. No vm available.

## 2024-02-16 ENCOUNTER — OFFICE VISIT (OUTPATIENT)
Dept: INTERNAL MEDICINE | Facility: CLINIC | Age: 69
End: 2024-02-16
Payer: MEDICARE

## 2024-02-16 VITALS
WEIGHT: 196.8 LBS | OXYGEN SATURATION: 98 % | TEMPERATURE: 97.7 F | BODY MASS INDEX: 38.64 KG/M2 | SYSTOLIC BLOOD PRESSURE: 130 MMHG | HEIGHT: 60 IN | RESPIRATION RATE: 18 BRPM | DIASTOLIC BLOOD PRESSURE: 80 MMHG | HEART RATE: 82 BPM

## 2024-02-16 DIAGNOSIS — I10 ESSENTIAL HYPERTENSION: ICD-10-CM

## 2024-02-16 DIAGNOSIS — E78.5 HYPERLIPIDEMIA, UNSPECIFIED HYPERLIPIDEMIA TYPE: ICD-10-CM

## 2024-02-16 DIAGNOSIS — Z12.31 VISIT FOR SCREENING MAMMOGRAM: ICD-10-CM

## 2024-02-16 DIAGNOSIS — E61.1 IRON DEFICIENCY: ICD-10-CM

## 2024-02-16 DIAGNOSIS — G47.00 INSOMNIA, UNSPECIFIED TYPE: ICD-10-CM

## 2024-02-16 DIAGNOSIS — E05.90 HYPERTHYROIDISM: ICD-10-CM

## 2024-02-16 DIAGNOSIS — E55.9 VITAMIN D DEFICIENCY: ICD-10-CM

## 2024-02-16 DIAGNOSIS — G47.33 OSA (OBSTRUCTIVE SLEEP APNEA): ICD-10-CM

## 2024-02-16 DIAGNOSIS — Z91.89 AT RISK FROM SECONDHAND SMOKE EXPOSURE: ICD-10-CM

## 2024-02-16 DIAGNOSIS — Z00.00 MEDICARE ANNUAL WELLNESS VISIT, SUBSEQUENT: Primary | ICD-10-CM

## 2024-02-16 DIAGNOSIS — E04.1 THYROID NODULE: ICD-10-CM

## 2024-02-16 DIAGNOSIS — Z86.16 HISTORY OF COVID-19: ICD-10-CM

## 2024-02-16 DIAGNOSIS — N18.4 STAGE 4 CHRONIC KIDNEY DISEASE: ICD-10-CM

## 2024-02-16 DIAGNOSIS — F33.0 MAJOR DEPRESSIVE DISORDER, RECURRENT, MILD: ICD-10-CM

## 2024-02-16 DIAGNOSIS — R06.02 SHORTNESS OF BREATH: ICD-10-CM

## 2024-02-16 DIAGNOSIS — K21.9 GASTROESOPHAGEAL REFLUX DISEASE WITHOUT ESOPHAGITIS: ICD-10-CM

## 2024-02-16 PROBLEM — S50.812A: Status: RESOLVED | Noted: 2023-01-20 | Resolved: 2024-02-16

## 2024-02-16 PROBLEM — J01.10 ACUTE NON-RECURRENT FRONTAL SINUSITIS: Status: RESOLVED | Noted: 2022-12-20 | Resolved: 2024-02-16
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
## 2024-02-16 PROBLEM — R50.9 FEVER IN ADULT: Status: RESOLVED | Noted: 2023-03-16 | Resolved: 2024-02-16

## 2024-02-16 RX ORDER — FERROUS SULFATE 325(65) MG
1 TABLET ORAL DAILY
Qty: 90 TABLET | Refills: 1 | Status: SHIPPED | OUTPATIENT
Start: 2024-02-16

## 2024-02-16 RX ORDER — CARVEDILOL 3.12 MG/1
3.12 TABLET ORAL 2 TIMES DAILY WITH MEALS
Qty: 180 TABLET | Refills: 1 | Status: SHIPPED | OUTPATIENT
Start: 2024-02-16

## 2024-02-16 RX ORDER — HYDROXYZINE 50 MG/1
50 TABLET, FILM COATED ORAL 3 TIMES DAILY PRN
Qty: 270 TABLET | Refills: 1 | Status: SHIPPED | OUTPATIENT
Start: 2024-02-16

## 2024-02-16 RX ORDER — FUROSEMIDE 20 MG/1
20 TABLET ORAL DAILY
Qty: 90 TABLET | Refills: 1 | Status: SHIPPED | OUTPATIENT
Start: 2024-02-16

## 2024-02-16 RX ORDER — FLUOXETINE 10 MG/1
10 CAPSULE ORAL DAILY
Qty: 30 CAPSULE | Refills: 1 | Status: SHIPPED | OUTPATIENT
Start: 2024-02-16

## 2024-02-16 RX ORDER — HYDROCODONE BITARTRATE AND ACETAMINOPHEN 7.5; 325 MG/1; MG/1
1 TABLET ORAL EVERY 6 HOURS PRN
Qty: 60 TABLET | Refills: 1 | Status: CANCELLED | OUTPATIENT
Start: 2024-02-16

## 2024-02-16 RX ORDER — TRAZODONE HYDROCHLORIDE 100 MG/1
100 TABLET ORAL NIGHTLY
Qty: 90 TABLET | Refills: 1 | Status: SHIPPED | OUTPATIENT
Start: 2024-02-16

## 2024-02-16 RX ORDER — ESCITALOPRAM OXALATE 20 MG/1
20 TABLET ORAL DAILY
Qty: 90 TABLET | Refills: 1 | Status: CANCELLED | OUTPATIENT
Start: 2024-02-16

## 2024-02-16 RX ORDER — BUPROPION HYDROCHLORIDE 150 MG/1
150 TABLET ORAL DAILY
Qty: 90 TABLET | Refills: 1 | Status: SHIPPED | OUTPATIENT
Start: 2024-02-16

## 2024-02-16 RX ORDER — SIMVASTATIN 40 MG
40 TABLET ORAL NIGHTLY
Qty: 90 TABLET | Refills: 1 | Status: SHIPPED | OUTPATIENT
Start: 2024-02-16

## 2024-02-16 RX ORDER — PANTOPRAZOLE SODIUM 40 MG/1
40 TABLET, DELAYED RELEASE ORAL DAILY
Qty: 90 TABLET | Refills: 1 | Status: SHIPPED | OUTPATIENT
Start: 2024-02-16

## 2024-02-16 RX ORDER — CYCLOBENZAPRINE HCL 10 MG
10 TABLET ORAL 3 TIMES DAILY PRN
Qty: 180 TABLET | Refills: 1 | Status: SHIPPED | OUTPATIENT
Start: 2024-02-16

## 2024-02-28 ENCOUNTER — TRANSCRIBE ORDERS (OUTPATIENT)
Dept: ADMINISTRATIVE | Facility: HOSPITAL | Age: 69
End: 2024-02-28
Payer: MEDICARE

## 2024-02-28 ENCOUNTER — LAB (OUTPATIENT)
Dept: LAB | Facility: HOSPITAL | Age: 69
End: 2024-02-28
Payer: MEDICARE

## 2024-02-28 DIAGNOSIS — E55.9 VITAMIN D DEFICIENCY: ICD-10-CM

## 2024-02-28 DIAGNOSIS — E61.1 IRON DEFICIENCY: ICD-10-CM

## 2024-02-28 DIAGNOSIS — E78.5 HYPERLIPIDEMIA, UNSPECIFIED HYPERLIPIDEMIA TYPE: ICD-10-CM

## 2024-02-28 DIAGNOSIS — N18.9 CHRONIC KIDNEY DISEASE, UNSPECIFIED CKD STAGE: Primary | ICD-10-CM

## 2024-02-28 DIAGNOSIS — E05.90 HYPERTHYROIDISM: ICD-10-CM

## 2024-02-28 DIAGNOSIS — I10 ESSENTIAL HYPERTENSION: ICD-10-CM

## 2024-02-28 DIAGNOSIS — N18.9 CHRONIC KIDNEY DISEASE, UNSPECIFIED CKD STAGE: ICD-10-CM

## 2024-02-28 LAB
25(OH)D3 SERPL-MCNC: 66.5 NG/ML (ref 30–100)
ALBUMIN SERPL-MCNC: 3.9 G/DL (ref 3.5–5.2)
ALBUMIN/GLOB SERPL: 1.3 G/DL
ALP SERPL-CCNC: 103 U/L (ref 39–117)
ALT SERPL W P-5'-P-CCNC: 22 U/L (ref 1–33)
ANION GAP SERPL CALCULATED.3IONS-SCNC: 16.7 MMOL/L (ref 5–15)
AST SERPL-CCNC: 22 U/L (ref 1–32)
BACTERIA UR QL AUTO: ABNORMAL /HPF
BASOPHILS # BLD AUTO: 0.08 10*3/MM3 (ref 0–0.2)
BASOPHILS NFR BLD AUTO: 0.8 % (ref 0–1.5)
BILIRUB SERPL-MCNC: 0.4 MG/DL (ref 0–1.2)
BILIRUB UR QL STRIP: NEGATIVE
BUN SERPL-MCNC: 47 MG/DL (ref 8–23)
BUN/CREAT SERPL: 19.2 (ref 7–25)
CALCIUM SPEC-SCNC: 8.8 MG/DL (ref 8.6–10.5)
CHLORIDE SERPL-SCNC: 103 MMOL/L (ref 98–107)
CHOLEST SERPL-MCNC: 147 MG/DL (ref 0–200)
CLARITY UR: ABNORMAL
CO2 SERPL-SCNC: 18.3 MMOL/L (ref 22–29)
COLOR UR: YELLOW
CREAT SERPL-MCNC: 2.45 MG/DL (ref 0.57–1)
CREAT UR-MCNC: 22.9 MG/DL
DEPRECATED RDW RBC AUTO: 43 FL (ref 37–54)
EGFRCR SERPLBLD CKD-EPI 2021: 21 ML/MIN/1.73
EOSINOPHIL # BLD AUTO: 0.42 10*3/MM3 (ref 0–0.4)
EOSINOPHIL NFR BLD AUTO: 4.3 % (ref 0.3–6.2)
ERYTHROCYTE [DISTWIDTH] IN BLOOD BY AUTOMATED COUNT: 12.8 % (ref 12.3–15.4)
FERRITIN SERPL-MCNC: 610 NG/ML (ref 13–150)
GLOBULIN UR ELPH-MCNC: 3 GM/DL
GLUCOSE SERPL-MCNC: 78 MG/DL (ref 65–99)
GLUCOSE UR STRIP-MCNC: NEGATIVE MG/DL
HCT VFR BLD AUTO: 33 % (ref 34–46.6)
HDLC SERPL-MCNC: 67 MG/DL (ref 40–60)
HGB BLD-MCNC: 10.8 G/DL (ref 12–15.9)
HGB UR QL STRIP.AUTO: NEGATIVE
HYALINE CASTS UR QL AUTO: ABNORMAL /LPF
IMM GRANULOCYTES # BLD AUTO: 0.04 10*3/MM3 (ref 0–0.05)
IMM GRANULOCYTES NFR BLD AUTO: 0.4 % (ref 0–0.5)
IRON 24H UR-MRATE: 78 MCG/DL (ref 37–145)
IRON SATN MFR SERPL: 25 % (ref 20–50)
KETONES UR QL STRIP: NEGATIVE
LDLC SERPL CALC-MCNC: 69 MG/DL (ref 0–100)
LDLC/HDLC SERPL: 1.04 {RATIO}
LEUKOCYTE ESTERASE UR QL STRIP.AUTO: ABNORMAL
LYMPHOCYTES # BLD AUTO: 2.65 10*3/MM3 (ref 0.7–3.1)
LYMPHOCYTES NFR BLD AUTO: 26.9 % (ref 19.6–45.3)
MCH RBC QN AUTO: 30.3 PG (ref 26.6–33)
MCHC RBC AUTO-ENTMCNC: 32.7 G/DL (ref 31.5–35.7)
MCV RBC AUTO: 92.7 FL (ref 79–97)
MONOCYTES # BLD AUTO: 0.84 10*3/MM3 (ref 0.1–0.9)
MONOCYTES NFR BLD AUTO: 8.5 % (ref 5–12)
NEUTROPHILS NFR BLD AUTO: 5.81 10*3/MM3 (ref 1.7–7)
NEUTROPHILS NFR BLD AUTO: 59.1 % (ref 42.7–76)
NITRITE UR QL STRIP: NEGATIVE
NRBC BLD AUTO-RTO: 0 /100 WBC (ref 0–0.2)
PH UR STRIP.AUTO: 5.5 [PH] (ref 5–8)
PHOSPHATE SERPL-MCNC: 4.6 MG/DL (ref 2.5–4.5)
PLATELET # BLD AUTO: 234 10*3/MM3 (ref 140–450)
PMV BLD AUTO: 11.5 FL (ref 6–12)
POTASSIUM SERPL-SCNC: 4.5 MMOL/L (ref 3.5–5.2)
PROT ?TM UR-MCNC: 6.2 MG/DL
PROT SERPL-MCNC: 6.9 G/DL (ref 6–8.5)
PROT UR QL STRIP: NEGATIVE
PROT/CREAT UR: 0.27 MG/G{CREAT}
PTH-INTACT SERPL-MCNC: 169 PG/ML (ref 15–65)
RBC # BLD AUTO: 3.56 10*6/MM3 (ref 3.77–5.28)
RBC # UR STRIP: ABNORMAL /HPF
REF LAB TEST METHOD: ABNORMAL
SODIUM SERPL-SCNC: 138 MMOL/L (ref 136–145)
SP GR UR STRIP: 1.01 (ref 1–1.03)
SQUAMOUS #/AREA URNS HPF: ABNORMAL /HPF
T4 FREE SERPL-MCNC: 1.18 NG/DL (ref 0.93–1.7)
TIBC SERPL-MCNC: 307 MCG/DL (ref 298–536)
TRANSFERRIN SERPL-MCNC: 206 MG/DL (ref 200–360)
TRIGL SERPL-MCNC: 51 MG/DL (ref 0–150)
TSH SERPL DL<=0.05 MIU/L-ACNC: 1.66 UIU/ML (ref 0.27–4.2)
UROBILINOGEN UR QL STRIP: ABNORMAL
VLDLC SERPL-MCNC: 11 MG/DL (ref 5–40)
WBC # UR STRIP: ABNORMAL /HPF
WBC NRBC COR # BLD AUTO: 9.84 10*3/MM3 (ref 3.4–10.8)

## 2024-02-28 PROCEDURE — 84443 ASSAY THYROID STIM HORMONE: CPT

## 2024-02-28 PROCEDURE — 85025 COMPLETE CBC W/AUTO DIFF WBC: CPT

## 2024-02-28 PROCEDURE — 84466 ASSAY OF TRANSFERRIN: CPT

## 2024-02-28 PROCEDURE — 80053 COMPREHEN METABOLIC PANEL: CPT

## 2024-02-28 PROCEDURE — 84100 ASSAY OF PHOSPHORUS: CPT

## 2024-02-28 PROCEDURE — 83970 ASSAY OF PARATHORMONE: CPT

## 2024-02-28 PROCEDURE — 84156 ASSAY OF PROTEIN URINE: CPT

## 2024-02-28 PROCEDURE — 36415 COLL VENOUS BLD VENIPUNCTURE: CPT

## 2024-02-28 PROCEDURE — 82728 ASSAY OF FERRITIN: CPT

## 2024-02-28 PROCEDURE — 84439 ASSAY OF FREE THYROXINE: CPT

## 2024-02-28 PROCEDURE — 83540 ASSAY OF IRON: CPT

## 2024-02-28 PROCEDURE — 82570 ASSAY OF URINE CREATININE: CPT

## 2024-02-28 PROCEDURE — 82306 VITAMIN D 25 HYDROXY: CPT

## 2024-02-28 PROCEDURE — 81001 URINALYSIS AUTO W/SCOPE: CPT

## 2024-02-28 PROCEDURE — 80061 LIPID PANEL: CPT

## 2024-03-29 ENCOUNTER — HOSPITAL ENCOUNTER (OUTPATIENT)
Dept: MAMMOGRAPHY | Facility: HOSPITAL | Age: 69
Discharge: HOME | End: 2024-03-29
Payer: MEDICARE

## 2024-03-29 ENCOUNTER — HOSPITAL ENCOUNTER (OUTPATIENT)
Dept: ULTRASOUND IMAGING | Facility: HOSPITAL | Age: 69
Discharge: HOME | End: 2024-03-29
Payer: MEDICARE

## 2024-03-29 DIAGNOSIS — E04.1 THYROID NODULE: ICD-10-CM

## 2024-03-29 DIAGNOSIS — Z12.31 VISIT FOR SCREENING MAMMOGRAM: ICD-10-CM

## 2024-03-29 PROCEDURE — 77067 SCR MAMMO BI INCL CAD: CPT

## 2024-03-29 PROCEDURE — 77063 BREAST TOMOSYNTHESIS BI: CPT

## 2024-03-29 PROCEDURE — 76536 US EXAM OF HEAD AND NECK: CPT

## 2024-04-02 ENCOUNTER — OFFICE VISIT (OUTPATIENT)
Dept: INTERNAL MEDICINE | Facility: CLINIC | Age: 69
End: 2024-04-02
Payer: MEDICARE

## 2024-04-02 VITALS
BODY MASS INDEX: 38.28 KG/M2 | SYSTOLIC BLOOD PRESSURE: 132 MMHG | TEMPERATURE: 97.2 F | HEART RATE: 87 BPM | HEIGHT: 60 IN | WEIGHT: 195 LBS | OXYGEN SATURATION: 98 % | DIASTOLIC BLOOD PRESSURE: 84 MMHG

## 2024-04-02 DIAGNOSIS — R49.0 HOARSENESS OF VOICE: ICD-10-CM

## 2024-04-02 DIAGNOSIS — R35.0 URINE FREQUENCY: Primary | ICD-10-CM

## 2024-04-02 LAB
BACTERIA UR QL AUTO: ABNORMAL /HPF
BILIRUB UR QL STRIP: NEGATIVE
CLARITY UR: CLEAR
COLOR UR: YELLOW
GLUCOSE UR STRIP-MCNC: NEGATIVE MG/DL
HGB UR QL STRIP.AUTO: NEGATIVE
HYALINE CASTS UR QL AUTO: ABNORMAL /LPF
KETONES UR QL STRIP: NEGATIVE
LEUKOCYTE ESTERASE UR QL STRIP.AUTO: ABNORMAL
NITRITE UR QL STRIP: NEGATIVE
PH UR STRIP.AUTO: 6 [PH] (ref 5–8)
PROT UR QL STRIP: NEGATIVE
RBC # UR STRIP: ABNORMAL /HPF
REF LAB TEST METHOD: ABNORMAL
SP GR UR STRIP: 1.02 (ref 1–1.03)
SQUAMOUS #/AREA URNS HPF: ABNORMAL /HPF
UROBILINOGEN UR QL STRIP: ABNORMAL
WBC # UR STRIP: ABNORMAL /HPF

## 2024-04-02 PROCEDURE — 1159F MED LIST DOCD IN RCRD: CPT | Performed by: NURSE PRACTITIONER

## 2024-04-02 PROCEDURE — 3075F SYST BP GE 130 - 139MM HG: CPT | Performed by: NURSE PRACTITIONER

## 2024-04-02 PROCEDURE — 1160F RVW MEDS BY RX/DR IN RCRD: CPT | Performed by: NURSE PRACTITIONER

## 2024-04-02 PROCEDURE — 87186 SC STD MICRODIL/AGAR DIL: CPT | Performed by: NURSE PRACTITIONER

## 2024-04-02 PROCEDURE — 81001 URINALYSIS AUTO W/SCOPE: CPT | Performed by: NURSE PRACTITIONER

## 2024-04-02 PROCEDURE — 3079F DIAST BP 80-89 MM HG: CPT | Performed by: NURSE PRACTITIONER

## 2024-04-02 PROCEDURE — 99213 OFFICE O/P EST LOW 20 MIN: CPT | Performed by: NURSE PRACTITIONER

## 2024-04-02 PROCEDURE — 87086 URINE CULTURE/COLONY COUNT: CPT | Performed by: NURSE PRACTITIONER

## 2024-04-02 RX ORDER — CEPHALEXIN 500 MG/1
500 CAPSULE ORAL 2 TIMES DAILY
Qty: 10 CAPSULE | Refills: 0 | Status: SHIPPED | OUTPATIENT
Start: 2024-04-02 | End: 2024-04-07

## 2024-04-02 RX ORDER — CETIRIZINE HYDROCHLORIDE 10 MG/1
10 TABLET ORAL DAILY
Qty: 90 TABLET | Refills: 1 | Status: SHIPPED | OUTPATIENT
Start: 2024-04-02

## 2024-04-02 NOTE — PROGRESS NOTES
"Chief Complaint  Urinary Tract Infection (Possible UTI), Urinary Frequency (X 1 week ), and Pelvic Pain (Having pain in pelvic area )    Subjective      Ro Connor is a 68 y.o. female who presents to [facility]     Patient reports last week she had pain from her lower back that radiated down into her pelvis. States the pain was severe the first day of symptoms, has been a constant pressure since that day. Continues with dysuria, pelvic pressure and urinary frequency. Urine dark in color and malodorous. Denies fever, nausea, vomiting, hematuria.     Patient reports COVID infection approximately 2 months ago.  Cough persisted for many weeks, recently resolved.  Patient reports since that time she has been experiencing intermittent episodes of hoarseness.  Reports some days she wakes up and she will barely have a voice, other days seems somewhat normal.  GERD managed with Protonix, patient denies breakthrough symptoms.  She takes hydroxyzine routinely for itching secondary to CKD.  Will take Benadryl for breakthrough symptoms.  EGD 2019 with esophageal hiatal hernia and stricture in the gastroesophageal junction.  Patient without fever, night sweats, weight loss, lymphadenopathy.    Objective   Vital Signs:   Vitals:    04/02/24 1314   BP: 132/84   BP Location: Left arm   Patient Position: Sitting   Cuff Size: Large Adult   Pulse: 87   Temp: 97.2 °F (36.2 °C)   TempSrc: Temporal   SpO2: 98%   Weight: 88.5 kg (195 lb)   Height: 152.4 cm (60\")     Body mass index is 38.08 kg/m².    Wt Readings from Last 3 Encounters:   04/02/24 88.5 kg (195 lb)   02/16/24 89.3 kg (196 lb 12.8 oz)   05/30/23 86.8 kg (191 lb 6.4 oz)     BP Readings from Last 3 Encounters:   04/02/24 132/84   02/16/24 130/80   05/30/23 142/84       Health Maintenance   Topic Date Due    ZOSTER VACCINE (1 of 2) Never done    INFLUENZA VACCINE  08/01/2024    ANNUAL WELLNESS VISIT  02/16/2025    BMI " FOLLOWUP  02/16/2025    LIPID PANEL  02/28/2025    DXA SCAN  03/13/2025    MAMMOGRAM  03/29/2026    TDAP/TD VACCINES (2 - Td or Tdap) 12/29/2027    COLORECTAL CANCER SCREENING  04/22/2029    HEPATITIS C SCREENING  Completed    COVID-19 Vaccine  Completed    RSV Vaccine - Adults  Completed    Pneumococcal Vaccine 65+  Completed    PAP SMEAR  Discontinued       Physical Exam  Constitutional:       Appearance: Normal appearance.   HENT:      Head: Normocephalic and atraumatic.      Nose: Nose normal.      Mouth/Throat:      Mouth: Mucous membranes are moist.      Pharynx: Oropharynx is clear.   Eyes:      Extraocular Movements: Extraocular movements intact.      Conjunctiva/sclera: Conjunctivae normal.      Pupils: Pupils are equal, round, and reactive to light.   Cardiovascular:      Rate and Rhythm: Normal rate and regular rhythm.      Heart sounds: Normal heart sounds.   Pulmonary:      Effort: Pulmonary effort is normal.      Breath sounds: Normal breath sounds.   Skin:     General: Skin is warm and dry.   Neurological:      General: No focal deficit present.      Mental Status: She is alert and oriented to person, place, and time.   Psychiatric:         Mood and Affect: Mood normal.         Behavior: Behavior normal.         Thought Content: Thought content normal.          Result Review :  The following data was reviewed by: MAYUR Nelson on 04/02/2024:         Procedures          Assessment & Plan  Urine frequency  Will treat with Keflex at this time.  Patient does not tolerate Bactrim and renal function excludes Macrobid as an option for management.  Urine for micro and culture, will determine if antibiotic adjustment is warranted based on results. Increase water intake, monitor output.  She should seek medical attention immediately with nausea, vomiting, fever, chills, flank pain, hematuria. Patient will call or return to clinic with concerns.   Hoarseness of voice  Likely secondary to viral illness.   Will restart daily Zyrtec.  Patient will notify clinic if symptoms do not improve over the next 10 days.  Will consider referral to ENT, CT of the neck and/or referral to GI to repeat EGD based on progression of symptoms.  Reviewed recent thyroid ultrasound that was stable from previous imaging.      Orders Placed This Encounter   Procedures    Urine Culture - Urine, Urine, Clean Catch    Urinalysis With Culture If Indicated -    Urinalysis, Microscopic Only - Urine, Clean Catch     New Medications Ordered This Visit   Medications    cephalexin (Keflex) 500 MG capsule     Sig: Take 1 capsule by mouth 2 (Two) Times a Day for 5 days.     Dispense:  10 capsule     Refill:  0    cetirizine (zyrTEC) 10 MG tablet     Sig: Take 1 tablet by mouth Daily.     Dispense:  90 tablet     Refill:  1                    FOLLOW UP  Return if symptoms worsen or fail to improve.  Patient was given instructions and counseling regarding her condition or for health maintenance advice. Please see specific information pulled into the AVS if appropriate.       MAYUR Nelson  04/02/24  14:33 EDT    CURRENT & DISCONTINUED MEDICATIONS  Current Outpatient Medications   Medication Instructions    buPROPion XL (WELLBUTRIN XL) 150 mg, Oral, Daily    Calcium Carbonate (CALCIUM 500 PO) 500 mg, Oral, 2 Times Daily    carvedilol (COREG) 3.125 mg, Oral, 2 Times Daily With Meals    cephalexin (KEFLEX) 500 mg, Oral, 2 Times Daily    cetirizine (ZYRTEC) 10 mg, Oral, Daily    cyclobenzaprine (FLEXERIL) 10 mg, Oral, 3 Times Daily PRN    ferrous sulfate (SV IRON) 325 mg, Oral, Daily    FLUoxetine (PROZAC) 10 mg, Oral, Daily    furosemide (LASIX) 20 mg, Oral, Daily    HYDROcodone-acetaminophen (NORCO) 7.5-325 MG per tablet 1 tablet, Oral, Every 6 Hours PRN    hydrOXYzine (ATARAX) 50 mg, Oral, 3 Times Daily PRN    melatonin 1 MG tablet 1 tablet, Oral    pantoprazole (PROTONIX) 40 mg, Oral, Daily    Pediatric Multivitamins-Iron (Flintstones Complete) 18 MG  chewable tablet chewable tablet [place] multi vitamin one oral one daily.   Active    Probiotic Product (FORTIFY PROBIOTIC WOMENS PO) Oral    simvastatin (ZOCOR) 40 mg, Oral, Nightly    traZODone (DESYREL) 100 mg, Oral, Nightly       Medications Discontinued During This Encounter   Medication Reason    dicyclomine (BENTYL) 20 MG tablet

## 2024-04-05 LAB — BACTERIA SPEC AEROBE CULT: ABNORMAL

## 2024-04-15 RX ORDER — FLUOXETINE 10 MG/1
10 CAPSULE ORAL DAILY
Qty: 30 CAPSULE | Refills: 1 | Status: SHIPPED | OUTPATIENT
Start: 2024-04-15

## 2024-04-15 NOTE — TELEPHONE ENCOUNTER
Caller: Ro Connor    Relationship: Self    Best call back number: [phone]    Requested Prescriptions:   Requested Prescriptions     Pending Prescriptions Disp Refills    FLUoxetine (PROzac) 10 MG capsule 30 capsule 1     Sig: Take 1 capsule by mouth Daily.        Pharmacy where request should be sent:  [facility] [address] [place] [address] - [address] [facility] [phone] [facility] [phone] FX     Last office visit with prescribing clinician: 4/2/2024   Last telemedicine visit with prescribing clinician: Visit date not found   Next office visit with prescribing clinician: Visit date not found     Additional details provided by patient: PATIENT WOULD LIKE TO INCREASE MEDICATION DOSAGE TO 20 MG     Does the patient have less than a 3 day supply:  [x] Yes  [] No

## 2024-06-17 RX ORDER — FLUOXETINE 10 MG/1
10 CAPSULE ORAL DAILY
Qty: 30 CAPSULE | Refills: 0 | Status: SHIPPED | OUTPATIENT
Start: 2024-06-17

## 2024-07-24 ENCOUNTER — OFFICE VISIT (OUTPATIENT)
Dept: INTERNAL MEDICINE | Facility: CLINIC | Age: 69
End: 2024-07-24
Payer: MEDICARE

## 2024-07-24 VITALS
TEMPERATURE: 96.9 F | OXYGEN SATURATION: 96 % | BODY MASS INDEX: 37.89 KG/M2 | HEIGHT: 60 IN | HEART RATE: 87 BPM | SYSTOLIC BLOOD PRESSURE: 132 MMHG | WEIGHT: 193 LBS | DIASTOLIC BLOOD PRESSURE: 80 MMHG

## 2024-07-24 DIAGNOSIS — R53.82 CHRONIC FATIGUE: ICD-10-CM

## 2024-07-24 DIAGNOSIS — R25.1 TREMOR: ICD-10-CM

## 2024-07-24 DIAGNOSIS — E05.90 HYPERTHYROIDISM: ICD-10-CM

## 2024-07-24 DIAGNOSIS — E78.5 HYPERLIPIDEMIA, UNSPECIFIED HYPERLIPIDEMIA TYPE: ICD-10-CM

## 2024-07-24 DIAGNOSIS — R23.2 HOT FLASHES: ICD-10-CM

## 2024-07-24 DIAGNOSIS — Z13.1 SCREENING FOR DIABETES MELLITUS: ICD-10-CM

## 2024-07-24 DIAGNOSIS — I10 ESSENTIAL HYPERTENSION: Primary | ICD-10-CM

## 2024-07-24 DIAGNOSIS — E55.9 VITAMIN D DEFICIENCY: ICD-10-CM

## 2024-07-24 DIAGNOSIS — G47.00 INSOMNIA, UNSPECIFIED TYPE: ICD-10-CM

## 2024-07-24 DIAGNOSIS — E61.1 IRON DEFICIENCY: ICD-10-CM

## 2024-07-24 LAB
25(OH)D3 SERPL-MCNC: 79.7 NG/ML (ref 30–100)
ALBUMIN SERPL-MCNC: 4 G/DL (ref 3.5–5.2)
ALBUMIN/GLOB SERPL: 1.2 G/DL
ALP SERPL-CCNC: 97 U/L (ref 39–117)
ALT SERPL W P-5'-P-CCNC: 29 U/L (ref 1–33)
ANION GAP SERPL CALCULATED.3IONS-SCNC: 12 MMOL/L (ref 5–15)
AST SERPL-CCNC: 32 U/L (ref 1–32)
BASOPHILS # BLD AUTO: 0.06 10*3/MM3 (ref 0–0.2)
BASOPHILS NFR BLD AUTO: 0.9 % (ref 0–1.5)
BILIRUB SERPL-MCNC: 0.2 MG/DL (ref 0–1.2)
BUN SERPL-MCNC: 52 MG/DL (ref 8–23)
BUN/CREAT SERPL: 21.7 (ref 7–25)
CALCIUM SPEC-SCNC: 9.1 MG/DL (ref 8.6–10.5)
CHLORIDE SERPL-SCNC: 105 MMOL/L (ref 98–107)
CHOLEST SERPL-MCNC: 137 MG/DL (ref 0–200)
CO2 SERPL-SCNC: 21 MMOL/L (ref 22–29)
CREAT SERPL-MCNC: 2.4 MG/DL (ref 0.57–1)
DEPRECATED RDW RBC AUTO: 43.4 FL (ref 37–54)
EGFRCR SERPLBLD CKD-EPI 2021: 21.4 ML/MIN/1.73
EOSINOPHIL # BLD AUTO: 0.28 10*3/MM3 (ref 0–0.4)
EOSINOPHIL NFR BLD AUTO: 4 % (ref 0.3–6.2)
ERYTHROCYTE [DISTWIDTH] IN BLOOD BY AUTOMATED COUNT: 12.8 % (ref 12.3–15.4)
ESTRADIOL SERPL HS-MCNC: 15.5 PG/ML
FERRITIN SERPL-MCNC: 702 NG/ML (ref 13–150)
FOLATE SERPL-MCNC: >20 NG/ML (ref 4.78–24.2)
FSH SERPL-ACNC: 143 MIU/ML
GLOBULIN UR ELPH-MCNC: 3.3 GM/DL
GLUCOSE SERPL-MCNC: 88 MG/DL (ref 65–99)
HBA1C MFR BLD: 4.6 % (ref 4.8–5.6)
HCT VFR BLD AUTO: 33.5 % (ref 34–46.6)
HDLC SERPL-MCNC: 59 MG/DL (ref 40–60)
HGB BLD-MCNC: 10.8 G/DL (ref 12–15.9)
IMM GRANULOCYTES # BLD AUTO: 0.03 10*3/MM3 (ref 0–0.05)
IMM GRANULOCYTES NFR BLD AUTO: 0.4 % (ref 0–0.5)
IRON 24H UR-MRATE: 61 MCG/DL (ref 37–145)
IRON SATN MFR SERPL: 20 % (ref 20–50)
LDLC SERPL CALC-MCNC: 63 MG/DL (ref 0–100)
LDLC/HDLC SERPL: 1.07 {RATIO}
LH SERPL-ACNC: 72.9 MIU/ML
LYMPHOCYTES # BLD AUTO: 1.47 10*3/MM3 (ref 0.7–3.1)
LYMPHOCYTES NFR BLD AUTO: 21.1 % (ref 19.6–45.3)
MCH RBC QN AUTO: 30.2 PG (ref 26.6–33)
MCHC RBC AUTO-ENTMCNC: 32.2 G/DL (ref 31.5–35.7)
MCV RBC AUTO: 93.6 FL (ref 79–97)
MONOCYTES # BLD AUTO: 0.74 10*3/MM3 (ref 0.1–0.9)
MONOCYTES NFR BLD AUTO: 10.6 % (ref 5–12)
NEUTROPHILS NFR BLD AUTO: 4.38 10*3/MM3 (ref 1.7–7)
NEUTROPHILS NFR BLD AUTO: 63 % (ref 42.7–76)
NRBC BLD AUTO-RTO: 0 /100 WBC (ref 0–0.2)
PLATELET # BLD AUTO: 242 10*3/MM3 (ref 140–450)
PMV BLD AUTO: 11.5 FL (ref 6–12)
POTASSIUM SERPL-SCNC: 4.4 MMOL/L (ref 3.5–5.2)
PROT SERPL-MCNC: 7.3 G/DL (ref 6–8.5)
RBC # BLD AUTO: 3.58 10*6/MM3 (ref 3.77–5.28)
SODIUM SERPL-SCNC: 138 MMOL/L (ref 136–145)
T3FREE SERPL-MCNC: 1.75 PG/ML (ref 2–4.4)
T4 FREE SERPL-MCNC: 1 NG/DL (ref 0.92–1.68)
TIBC SERPL-MCNC: 302 MCG/DL (ref 298–536)
TRANSFERRIN SERPL-MCNC: 203 MG/DL (ref 200–360)
TRIGL SERPL-MCNC: 74 MG/DL (ref 0–150)
TSH SERPL DL<=0.05 MIU/L-ACNC: 1.8 UIU/ML (ref 0.27–4.2)
VIT B12 BLD-MCNC: 531 PG/ML (ref 211–946)
VLDLC SERPL-MCNC: 15 MG/DL (ref 5–40)
WBC NRBC COR # BLD AUTO: 6.96 10*3/MM3 (ref 3.4–10.8)

## 2024-07-24 PROCEDURE — 83036 HEMOGLOBIN GLYCOSYLATED A1C: CPT | Performed by: NURSE PRACTITIONER

## 2024-07-24 PROCEDURE — 82306 VITAMIN D 25 HYDROXY: CPT | Performed by: NURSE PRACTITIONER

## 2024-07-24 PROCEDURE — 83540 ASSAY OF IRON: CPT | Performed by: NURSE PRACTITIONER

## 2024-07-24 PROCEDURE — 84439 ASSAY OF FREE THYROXINE: CPT | Performed by: NURSE PRACTITIONER

## 2024-07-24 PROCEDURE — 82728 ASSAY OF FERRITIN: CPT | Performed by: NURSE PRACTITIONER

## 2024-07-24 PROCEDURE — 1160F RVW MEDS BY RX/DR IN RCRD: CPT | Performed by: NURSE PRACTITIONER

## 2024-07-24 PROCEDURE — 36415 COLL VENOUS BLD VENIPUNCTURE: CPT | Performed by: NURSE PRACTITIONER

## 2024-07-24 PROCEDURE — 80061 LIPID PANEL: CPT | Performed by: NURSE PRACTITIONER

## 2024-07-24 PROCEDURE — 84481 FREE ASSAY (FT-3): CPT | Performed by: NURSE PRACTITIONER

## 2024-07-24 PROCEDURE — 82746 ASSAY OF FOLIC ACID SERUM: CPT | Performed by: NURSE PRACTITIONER

## 2024-07-24 PROCEDURE — G2211 COMPLEX E/M VISIT ADD ON: HCPCS | Performed by: NURSE PRACTITIONER

## 2024-07-24 PROCEDURE — 82607 VITAMIN B-12: CPT | Performed by: NURSE PRACTITIONER

## 2024-07-24 PROCEDURE — 84443 ASSAY THYROID STIM HORMONE: CPT | Performed by: NURSE PRACTITIONER

## 2024-07-24 PROCEDURE — 3075F SYST BP GE 130 - 139MM HG: CPT | Performed by: NURSE PRACTITIONER

## 2024-07-24 PROCEDURE — 82670 ASSAY OF TOTAL ESTRADIOL: CPT | Performed by: NURSE PRACTITIONER

## 2024-07-24 PROCEDURE — 99214 OFFICE O/P EST MOD 30 MIN: CPT | Performed by: NURSE PRACTITIONER

## 2024-07-24 PROCEDURE — 1125F AMNT PAIN NOTED PAIN PRSNT: CPT | Performed by: NURSE PRACTITIONER

## 2024-07-24 PROCEDURE — 83001 ASSAY OF GONADOTROPIN (FSH): CPT | Performed by: NURSE PRACTITIONER

## 2024-07-24 PROCEDURE — 83002 ASSAY OF GONADOTROPIN (LH): CPT | Performed by: NURSE PRACTITIONER

## 2024-07-24 PROCEDURE — 84466 ASSAY OF TRANSFERRIN: CPT | Performed by: NURSE PRACTITIONER

## 2024-07-24 PROCEDURE — 3079F DIAST BP 80-89 MM HG: CPT | Performed by: NURSE PRACTITIONER

## 2024-07-24 PROCEDURE — 1159F MED LIST DOCD IN RCRD: CPT | Performed by: NURSE PRACTITIONER

## 2024-07-24 PROCEDURE — 85025 COMPLETE CBC W/AUTO DIFF WBC: CPT | Performed by: NURSE PRACTITIONER

## 2024-07-24 PROCEDURE — 80053 COMPREHEN METABOLIC PANEL: CPT | Performed by: NURSE PRACTITIONER

## 2024-07-24 RX ORDER — CARVEDILOL 6.25 MG/1
6.25 TABLET ORAL 2 TIMES DAILY WITH MEALS
Qty: 30 TABLET | Refills: 1 | Status: SHIPPED | OUTPATIENT
Start: 2024-07-24

## 2024-07-24 RX ORDER — UBIDECARENONE 75 MG
300 CAPSULE ORAL DAILY
COMMUNITY

## 2024-07-24 NOTE — ASSESSMENT & PLAN NOTE
Labs today. Discussed with patient that hormone levels are unlikely to give much direction to cause but will check today per request.

## 2024-07-24 NOTE — ASSESSMENT & PLAN NOTE
Labs today. Potentially secondary to chronic shoulder issue. Could consider imaging and/or neurology referral in the future based on response.

## 2024-07-24 NOTE — ASSESSMENT & PLAN NOTE
Discussed sleep hygiene interventions, including developing a bedtime routine, avoiding sources of blue light prior to bedtime, avoiding daytime naps, reducing caffeine intake, journaling thoughts and using the bed only for sleep.

## 2024-07-24 NOTE — PROGRESS NOTES
"Chief Complaint  Hypertension (F/u) and Hot Flashes (Hot flashed- concerned about hormone levels)    Subjective      Ro Connor is a 69 y.o. female who presents to [facility]     Patient reports she has not been feeling well. States she is not sleeping well unless she takes a trazodone and a melatonin, will then feel groggy the next day. Has noticed her blood pressure and heart rate have been off. Will have readings in the 130s-150s/80s and heart rate 80s-90s. Goal for nephrology is systolic less than 140 but higher than 115. Denies chest pain, shortness of breath, lower extremity edema. Struggles with chronic fatigue but she does feel like this has been worse lately. Has been having hot flashes 1-2 times a week. Will happen throughout the day. Can happen while she is sitting watching TV. Will last 15-20 minutes. Does not occur with activity. Not occurring at night when she is in bed. Up to date on mammogram, colonoscopy. She has also noticed a tremor in her right hand. 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 Started two months ago. Has not noticed in left but does not paint with this hand. Does notice with writing. More fine motor. Denies numbness/tingling in the hand. Is getting an injection in the right shoulder due to arthritis. Has weakness in that arm.     Objective   Vital Signs:   Vitals:    07/24/24 1246   BP: 132/80   BP Location: Right arm   Patient Position: Sitting   Cuff Size: Large Adult   Pulse: 87   Temp: 96.9 °F (36.1 °C)   TempSrc: Temporal   SpO2: 96%   Weight: 87.5 kg (193 lb)   Height: 152.4 cm (60\")     Body mass index is 37.69 kg/m².    Wt Readings from Last 3 Encounters:   07/24/24 87.5 kg (193 lb)   04/02/24 88.5 kg (195 lb)   02/16/24 89.3 kg (196 lb 12.8 oz)     BP Readings from Last 3 Encounters:   07/24/24 132/80   04/02/24 132/84   02/16/24 130/80       Health Maintenance   Topic Date Due    ZOSTER VACCINE (1 of 2) Never " done    COVID-19 Vaccine (4 - 2023-24 season) 05/11/2024    INFLUENZA VACCINE  08/01/2024    ANNUAL WELLNESS VISIT  02/16/2025    BMI FOLLOWUP  02/16/2025    LIPID PANEL  02/28/2025    DXA SCAN  03/13/2025    MAMMOGRAM  03/29/2026    TDAP/TD VACCINES (2 - Td or Tdap) 12/29/2027    COLORECTAL CANCER SCREENING  04/22/2029    HEPATITIS C SCREENING  Completed    Pneumococcal Vaccine 65+  Completed    PAP SMEAR  Discontinued       Physical Exam  Constitutional:       Appearance: Normal appearance.   HENT:      Head: Normocephalic and atraumatic.      Nose: Nose normal.      Mouth/Throat:      Mouth: Mucous membranes are moist.      Pharynx: Oropharynx is clear.   Eyes:      Extraocular Movements: Extraocular movements intact.      Conjunctiva/sclera: Conjunctivae normal.      Pupils: Pupils are equal, round, and reactive to light.   Neck:      Thyroid: No thyroid mass, thyromegaly or thyroid tenderness.   Cardiovascular:      Rate and Rhythm: Normal rate and regular rhythm.      Heart sounds: Normal heart sounds.   Pulmonary:      Effort: Pulmonary effort is normal.      Breath sounds: Normal breath sounds.   Skin:     General: Skin is warm and dry.   Neurological:      General: No focal deficit present.      Mental Status: She is alert and oriented to person, place, and time.   Psychiatric:         Mood and Affect: Mood normal.         Behavior: Behavior normal.         Thought Content: Thought content normal.          Result Review :  The following data was reviewed by: MAYUR Nelson on 07/24/2024:  Common labs          8/30/2023    13:48 2/28/2024    13:11   Common Labs   Glucose 90  78    BUN 56  47    Creatinine 2.43  2.45    Sodium 142  138    Potassium 4.5  4.5    Chloride 108  103    Calcium 9.3  8.8    Albumin  3.9    Total Bilirubin  0.4    Alkaline Phosphatase  103    AST (SGOT)  22    ALT (SGPT)  22    WBC 7.58  9.84    Hemoglobin 11.0  10.8    Hematocrit 33.9  33.0    Platelets 258  234    Total  Cholesterol  147    Triglycerides  51    HDL Cholesterol  67    LDL Cholesterol   69           Procedures          Assessment & Plan  Essential hypertension  Will increase Coreg to 6.25 mg BID. Discussed goal readings, she will monitor closely and notify clinic with consistent elevations or lows. Close follow up in one month, sooner if concerns arise. Repeat labs today.   Hot flashes  Labs today. Discussed with patient that hormone levels are unlikely to give much direction to cause but will check today per request.   Tremor  Labs today. Potentially secondary to chronic shoulder issue. Could consider imaging and/or neurology referral in the future based on response.  Chronic fatigue  Labs today.  Iron deficiency  Iron studies today.  Hyperthyroidism  Thyroid levels today.   Hyperlipidemia, unspecified hyperlipidemia type  Lipid panel today.  Vitamin D deficiency  Vitamin D level today.   Insomnia, unspecified type  Discussed sleep hygiene interventions, including developing a bedtime routine, avoiding sources of blue light prior to bedtime, avoiding daytime naps, reducing caffeine intake, journaling thoughts and using the bed only for sleep.   Screening for diabetes mellitus  A1c today.    Orders Placed This Encounter   Procedures    Comprehensive Metabolic Panel    TSH    Lipid Panel    T4, Free    T3, Free    Vitamin D,25-Hydroxy    Vitamin B12 & Folate    Iron Profile    Ferritin    Hemoglobin A1c    Estradiol    Follicle Stimulating Hormone    Luteinizing Hormone    CBC Auto Differential    CBC & Differential     New Medications Ordered This Visit   Medications    carvedilol (COREG) 6.25 MG tablet     Sig: Take 1 tablet by mouth 2 (Two) Times a Day With Meals.     Dispense:  30 tablet     Refill:  1       FOLLOW UP  Return in about 1 month (around 8/24/2024).  Patient was given instructions and counseling regarding her condition or for health maintenance advice. Please see specific information pulled into the  AVS if appropriate.       Maria Elena Mullins, APRN  07/24/24  13:34 EDT    CURRENT & DISCONTINUED MEDICATIONS  Current Outpatient Medications   Medication Instructions    buPROPion XL (WELLBUTRIN XL) 150 mg, Oral, Daily    Calcium Carbonate (CALCIUM 500 PO) 500 mg, Oral, 2 Times Daily    carvedilol (COREG) 6.25 mg, Oral, 2 Times Daily With Meals    cetirizine (ZYRTEC) 10 mg, Oral, Daily    Co Q-10 300 mg, Oral, Daily    cyclobenzaprine (FLEXERIL) 10 mg, Oral, 3 Times Daily PRN    ferrous sulfate (SV IRON) 325 mg, Oral, Daily    FLUoxetine (PROZAC) 10 mg, Oral, Daily    furosemide (LASIX) 20 mg, Oral, Daily    HYDROcodone-acetaminophen (NORCO) 7.5-325 MG per tablet 1 tablet, Oral, Every 6 Hours PRN    hydrOXYzine (ATARAX) 50 mg, Oral, 3 Times Daily PRN    melatonin 1 MG tablet 1 tablet, Oral    pantoprazole (PROTONIX) 40 mg, Oral, Daily    Pediatric Multivitamins-Iron (Flintstones Complete) 18 MG chewable tablet chewable tablet [place] multi vitamin one oral one daily.   Active    Probiotic Product (FORTIFY PROBIOTIC WOMENS PO) Oral    simvastatin (ZOCOR) 40 mg, Oral, Nightly    traZODone (DESYREL) 100 mg, Oral, Nightly       Medications Discontinued During This Encounter   Medication Reason    carvedilol (COREG) 3.125 MG tablet

## 2024-07-24 NOTE — ASSESSMENT & PLAN NOTE
Will increase Coreg to 6.25 mg BID. Discussed goal readings, she will monitor closely and notify clinic with consistent elevations or lows. Close follow up in one month, sooner if concerns arise. Repeat labs today.

## 2024-07-26 DIAGNOSIS — R23.2 HOT FLASHES: Primary | ICD-10-CM

## 2024-08-14 RX ORDER — PANTOPRAZOLE SODIUM 40 MG/1
40 TABLET, DELAYED RELEASE ORAL DAILY
Qty: 90 TABLET | Refills: 1 | Status: SHIPPED | OUTPATIENT
Start: 2024-08-14

## 2024-08-14 RX ORDER — FERROUS SULFATE 325(65) MG
1 TABLET ORAL DAILY
Qty: 90 TABLET | Refills: 1 | Status: SHIPPED | OUTPATIENT
Start: 2024-08-14

## 2024-08-14 RX ORDER — SIMVASTATIN 40 MG
40 TABLET ORAL NIGHTLY
Qty: 90 TABLET | Refills: 1 | Status: SHIPPED | OUTPATIENT
Start: 2024-08-14

## 2024-08-14 RX ORDER — FUROSEMIDE 20 MG/1
20 TABLET ORAL DAILY
Qty: 90 TABLET | Refills: 1 | Status: SHIPPED | OUTPATIENT
Start: 2024-08-14

## 2024-08-14 NOTE — TELEPHONE ENCOUNTER
Caller: Ro Connor CASSIE    Relationship: Self    Best call back number: [phone]     Requested Prescriptions:   Requested Prescriptions     Pending Prescriptions Disp Refills    pantoprazole (Protonix) 40 MG EC tablet 90 tablet 1     Sig: Take 1 tablet by mouth Daily.    ferrous sulfate (SV Iron) 325 (65 FE) MG tablet 90 tablet 1     Sig: Take 1 tablet by mouth Daily.    furosemide (LASIX) 20 MG tablet 90 tablet 1     Sig: Take 1 tablet by mouth Daily.    simvastatin (ZOCOR) 40 MG tablet 90 tablet 1     Sig: Take 1 tablet by mouth Every Night.        Pharmacy where request should be sent: [address] [phone] [facility] [phone] FX     Last office visit with prescribing clinician: 7/24/2024   Last telemedicine visit with prescribing clinician: Visit date not found   Next office visit with prescribing clinician: 8/26/2024     Additional details provided by patient: COMPLETELY OUT    Does the patient have less than a 3 day supply:  [x] Yes  [] No    Would you like a call back once the refill request has been completed: [x] Yes [] No    If the office needs to give you a call back, can they leave a voicemail: [x] Yes [] No    Faby Riley Rep   08/14/24 10:30 EDT

## 2024-08-26 ENCOUNTER — OFFICE VISIT (OUTPATIENT)
Dept: INTERNAL MEDICINE | Facility: CLINIC | Age: 69
End: 2024-08-26
Payer: MEDICARE

## 2024-08-26 VITALS
OXYGEN SATURATION: 96 % | BODY MASS INDEX: 37.58 KG/M2 | SYSTOLIC BLOOD PRESSURE: 134 MMHG | HEART RATE: 83 BPM | HEIGHT: 60 IN | DIASTOLIC BLOOD PRESSURE: 76 MMHG | TEMPERATURE: 97.5 F | WEIGHT: 191.4 LBS

## 2024-08-26 DIAGNOSIS — R23.2 HOT FLASHES: ICD-10-CM

## 2024-08-26 DIAGNOSIS — I10 ESSENTIAL HYPERTENSION: Primary | ICD-10-CM

## 2024-08-26 DIAGNOSIS — N18.4 STAGE 4 CHRONIC KIDNEY DISEASE: ICD-10-CM

## 2024-08-26 PROCEDURE — 1159F MED LIST DOCD IN RCRD: CPT | Performed by: NURSE PRACTITIONER

## 2024-08-26 PROCEDURE — 1160F RVW MEDS BY RX/DR IN RCRD: CPT | Performed by: NURSE PRACTITIONER

## 2024-08-26 PROCEDURE — 3078F DIAST BP <80 MM HG: CPT | Performed by: NURSE PRACTITIONER

## 2024-08-26 PROCEDURE — 1125F AMNT PAIN NOTED PAIN PRSNT: CPT | Performed by: NURSE PRACTITIONER

## 2024-08-26 PROCEDURE — 3075F SYST BP GE 130 - 139MM HG: CPT | Performed by: NURSE PRACTITIONER

## 2024-08-26 PROCEDURE — 99214 OFFICE O/P EST MOD 30 MIN: CPT | Performed by: NURSE PRACTITIONER

## 2024-08-26 PROCEDURE — G2211 COMPLEX E/M VISIT ADD ON: HCPCS | Performed by: NURSE PRACTITIONER

## 2024-08-26 RX ORDER — CARVEDILOL 12.5 MG/1
12.5 TABLET ORAL 2 TIMES DAILY WITH MEALS
Qty: 60 TABLET | Refills: 1 | Status: SHIPPED | OUTPATIENT
Start: 2024-08-26

## 2024-08-26 RX ORDER — CARVEDILOL 6.25 MG/1
6.25 TABLET ORAL 2 TIMES DAILY WITH MEALS
Qty: 30 TABLET | Refills: 1 | Status: CANCELLED | OUTPATIENT
Start: 2024-08-26

## 2024-08-26 NOTE — ASSESSMENT & PLAN NOTE
Increase carvedilol to 12.5 mg twice daily, she will check blood pressure before second daily dose and could consider adjusting to 6.25 mg dosage or holding if systolic is less than 120.  She will discuss further adjustments with nephrology at follow up, could consider additional medications in the future if blood pressure is not at goal.  [facility], sooner if concerns arise.

## 2024-08-26 NOTE — ASSESSMENT & PLAN NOTE
Patient will follow up with nephrology as scheduled.  She voices interest in GLP-1, discussed concern for this medication due to risk of dehydration and CKD.  Would also not expect insurance coverage, however she will discuss with nephrology and we can consider pursuing prescription based on recommendations

## 2024-08-26 NOTE — PROGRESS NOTES
"Chief Complaint  Hypertension (1 month f/u)    Subjective      Ro Connor is a 69 y.o. female who presents to [facility]     HTN-  Patient in clinic for follow up on blood pressure, Coreg was increased to twice daily at previous visit.  Patient reports readings consistently 130s-150s systolic.  She is scheduled for follow up with nephrology within the next week or so.  Has not experienced chest pain, shortness of breath, dizziness with medication adjustments.    Hot flashes-  Patient referred to GYN at most recent visit.  She is scheduled 10/2024.     Patient reports she has started to limit caffeine and is sleeping much better.    Objective   Vital Signs:   Vitals:    08/26/24 1525   BP: 134/76   BP Location: Left arm   Patient Position: Sitting   Cuff Size: Adult   Pulse: 83   Temp: 97.5 °F (36.4 °C)   TempSrc: Temporal   SpO2: 96%   Weight: 86.8 kg (191 lb 6.4 oz)   Height: 152.4 cm (60\")     Body mass index is 37.38 kg/m².    Wt Readings from Last 3 Encounters:   08/26/24 86.8 kg (191 lb 6.4 oz)   07/24/24 87.5 kg (193 lb)   04/02/24 88.5 kg (195 lb)     BP Readings from Last 3 Encounters:   08/26/24 134/76   07/24/24 132/80   04/02/24 132/84       Health Maintenance   Topic Date Due    ZOSTER VACCINE (1 of 2) Never done    COVID-19 Vaccine (4 - 2023-24 season) 05/11/2024    INFLUENZA VACCINE  08/01/2024    ANNUAL WELLNESS VISIT  02/16/2025    BMI FOLLOWUP  02/16/2025    DXA SCAN  03/13/2025    LIPID PANEL  07/24/2025    MAMMOGRAM  03/29/2026    TDAP/TD VACCINES (2 - Td or Tdap) 12/29/2027    COLORECTAL CANCER SCREENING  04/22/2029    HEPATITIS C SCREENING  Completed    Pneumococcal Vaccine 65+  Completed    PAP SMEAR  Discontinued       Physical Exam  Constitutional:       Appearance: Normal appearance.   HENT:      Head: Normocephalic and atraumatic.      Nose: Nose normal.      Mouth/Throat:      Mouth: Mucous membranes are moist.      Pharynx: " Oropharynx is clear.   Eyes:      Extraocular Movements: Extraocular movements intact.      Conjunctiva/sclera: Conjunctivae normal.      Pupils: Pupils are equal, round, and reactive to light.   Cardiovascular:      Rate and Rhythm: Normal rate and regular rhythm.      Heart sounds: Normal heart sounds.   Pulmonary:      Effort: Pulmonary effort is normal.      Breath sounds: Normal breath sounds.   Skin:     General: Skin is warm and dry.   Neurological:      General: No focal deficit present.      Mental Status: She is alert and oriented to person, place, and time.   Psychiatric:         Mood and Affect: Mood normal.         Behavior: Behavior normal.         Thought Content: Thought content normal.          Result Review :  The following data was reviewed by: MAYUR Nelson on 08/26/2024:  Common labs          2/28/2024    13:11 7/24/2024    13:39   Common Labs   Glucose 78  88    BUN 47  52    Creatinine 2.45  2.40    Sodium 138  138    Potassium 4.5  4.4    Chloride 103  105    Calcium 8.8  9.1    Albumin 3.9  4.0    Total Bilirubin 0.4  0.2    Alkaline Phosphatase 103  97    AST (SGOT) 22  32    ALT (SGPT) 22  29    WBC 9.84  6.96    Hemoglobin 10.8  10.8    Hematocrit 33.0  33.5    Platelets 234  242    Total Cholesterol 147  137    Triglycerides 51  74    HDL Cholesterol 67  59    LDL Cholesterol  69  63    Hemoglobin A1C  4.60           Procedures          Assessment & Plan  Essential hypertension  Increase carvedilol to 12.5 mg twice daily, she will check blood pressure before second daily dose and could consider adjusting to 6.25 mg dosage or holding if systolic is less than 120.  She will discuss further adjustments with nephrology at follow up, could consider additional medications in the future if blood pressure is not at goal.  [facility], sooner if concerns arise.  Stage 4 chronic kidney disease  Patient will follow up with nephrology as scheduled.  She voices  interest in GLP-1, discussed concern for this medication due to risk of dehydration and CKD.  Would also not expect insurance coverage, however she will discuss with nephrology and we can consider pursuing prescription based on recommendations  Hot flashes  Establish with GYN as scheduled.     New Medications Ordered This Visit   Medications    carvedilol (COREG) 12.5 MG tablet     Sig: Take 1 tablet by mouth 2 (Two) Times a Day With Meals.     Dispense:  60 tablet     Refill:  1       FOLLOW UP  Return in about 3 months (around 11/26/2024).  Patient was given instructions and counseling regarding her condition or for health maintenance advice. Please see specific information pulled into the AVS if appropriate.       Maria Elena Mullins, MAYUR  08/26/24  16:11 EDT    CURRENT & DISCONTINUED MEDICATIONS  Current Outpatient Medications   Medication Instructions    buPROPion XL (WELLBUTRIN XL) 150 mg, Oral, Daily    Calcium Carbonate (CALCIUM 500 PO) 500 mg, Oral, 2 Times Daily    carvedilol (COREG) 12.5 mg, Oral, 2 Times Daily With Meals    cetirizine (ZYRTEC) 10 mg, Oral, Daily    Co Q-10 300 mg, Oral, Daily    cyclobenzaprine (FLEXERIL) 10 mg, Oral, 3 Times Daily PRN    ferrous sulfate (SV IRON) 325 mg, Oral, Daily    FLUoxetine (PROZAC) 10 mg, Oral, Daily    furosemide (LASIX) 20 mg, Oral, Daily    HYDROcodone-acetaminophen (NORCO) 7.5-325 MG per tablet 1 tablet, Oral, Every 6 Hours PRN    hydrOXYzine (ATARAX) 50 mg, Oral, 3 Times Daily PRN    melatonin 1 MG tablet 1 tablet, Oral    pantoprazole (PROTONIX) 40 mg, Oral, Daily    Pediatric Multivitamins-Iron (Flintstones Complete) 18 MG chewable tablet chewable tablet Goodman multi vitamin one oral one daily.   Active    Probiotic Product (FORTIFY PROBIOTIC WOMENS PO) Oral    simvastatin (ZOCOR) 40 mg, Oral, Nightly    traZODone (DESYREL) 100 mg, Oral, Nightly       Medications Discontinued During This Encounter   Medication Reason    carvedilol (COREG) 6.25 MG tablet

## 2024-09-04 ENCOUNTER — LAB (OUTPATIENT)
Dept: LAB | Facility: HOSPITAL | Age: 69
End: 2024-09-04
Payer: MEDICARE

## 2024-09-04 ENCOUNTER — TRANSCRIBE ORDERS (OUTPATIENT)
Dept: ADMINISTRATIVE | Facility: HOSPITAL | Age: 69
End: 2024-09-04
Payer: MEDICARE

## 2024-09-04 DIAGNOSIS — N18.4 CHRONIC KIDNEY DISEASE, STAGE IV (SEVERE): ICD-10-CM

## 2024-09-04 DIAGNOSIS — N18.4 CHRONIC KIDNEY DISEASE, STAGE IV (SEVERE): Primary | ICD-10-CM

## 2024-09-04 LAB
ANION GAP SERPL CALCULATED.3IONS-SCNC: 10 MMOL/L (ref 5–15)
BACTERIA UR QL AUTO: ABNORMAL /HPF
BASOPHILS # BLD AUTO: 0.07 10*3/MM3 (ref 0–0.2)
BASOPHILS NFR BLD AUTO: 0.5 % (ref 0–1.5)
BILIRUB UR QL STRIP: NEGATIVE
BUN SERPL-MCNC: 64 MG/DL (ref 8–23)
BUN/CREAT SERPL: 23.4 (ref 7–25)
CALCIUM SPEC-SCNC: 9.5 MG/DL (ref 8.6–10.5)
CHLORIDE SERPL-SCNC: 107 MMOL/L (ref 98–107)
CLARITY UR: ABNORMAL
CO2 SERPL-SCNC: 22 MMOL/L (ref 22–29)
COLOR UR: YELLOW
CREAT SERPL-MCNC: 2.74 MG/DL (ref 0.57–1)
CREAT UR-MCNC: 28.8 MG/DL
DEPRECATED RDW RBC AUTO: 44.6 FL (ref 37–54)
EGFRCR SERPLBLD CKD-EPI 2021: 18.2 ML/MIN/1.73
EOSINOPHIL # BLD AUTO: 0.36 10*3/MM3 (ref 0–0.4)
EOSINOPHIL NFR BLD AUTO: 2.8 % (ref 0.3–6.2)
ERYTHROCYTE [DISTWIDTH] IN BLOOD BY AUTOMATED COUNT: 12.8 % (ref 12.3–15.4)
GLUCOSE SERPL-MCNC: 93 MG/DL (ref 65–99)
GLUCOSE UR STRIP-MCNC: NEGATIVE MG/DL
HCT VFR BLD AUTO: 34.2 % (ref 34–46.6)
HGB BLD-MCNC: 10.9 G/DL (ref 12–15.9)
HGB UR QL STRIP.AUTO: ABNORMAL
HYALINE CASTS UR QL AUTO: ABNORMAL /LPF
IMM GRANULOCYTES # BLD AUTO: 0.05 10*3/MM3 (ref 0–0.05)
IMM GRANULOCYTES NFR BLD AUTO: 0.4 % (ref 0–0.5)
KETONES UR QL STRIP: NEGATIVE
LEUKOCYTE ESTERASE UR QL STRIP.AUTO: ABNORMAL
LYMPHOCYTES # BLD AUTO: 2.35 10*3/MM3 (ref 0.7–3.1)
LYMPHOCYTES NFR BLD AUTO: 18 % (ref 19.6–45.3)
MCH RBC QN AUTO: 30.6 PG (ref 26.6–33)
MCHC RBC AUTO-ENTMCNC: 31.9 G/DL (ref 31.5–35.7)
MCV RBC AUTO: 96.1 FL (ref 79–97)
MONOCYTES # BLD AUTO: 1.06 10*3/MM3 (ref 0.1–0.9)
MONOCYTES NFR BLD AUTO: 8.1 % (ref 5–12)
NEUTROPHILS NFR BLD AUTO: 70.2 % (ref 42.7–76)
NEUTROPHILS NFR BLD AUTO: 9.18 10*3/MM3 (ref 1.7–7)
NITRITE UR QL STRIP: NEGATIVE
NRBC BLD AUTO-RTO: 0 /100 WBC (ref 0–0.2)
PH UR STRIP.AUTO: 6.5 [PH] (ref 5–8)
PHOSPHATE SERPL-MCNC: 3.9 MG/DL (ref 2.5–4.5)
PLATELET # BLD AUTO: 208 10*3/MM3 (ref 140–450)
PMV BLD AUTO: 11.8 FL (ref 6–12)
POTASSIUM SERPL-SCNC: 4.4 MMOL/L (ref 3.5–5.2)
PROT ?TM UR-MCNC: 10.7 MG/DL
PROT UR QL STRIP: NEGATIVE
PROT/CREAT UR: 0.37 MG/G{CREAT}
PTH-INTACT SERPL-MCNC: 132 PG/ML (ref 15–65)
RBC # BLD AUTO: 3.56 10*6/MM3 (ref 3.77–5.28)
RBC # UR STRIP: ABNORMAL /HPF
REF LAB TEST METHOD: ABNORMAL
SODIUM SERPL-SCNC: 139 MMOL/L (ref 136–145)
SP GR UR STRIP: 1.01 (ref 1–1.03)
SQUAMOUS #/AREA URNS HPF: ABNORMAL /HPF
UROBILINOGEN UR QL STRIP: ABNORMAL
WBC # UR STRIP: ABNORMAL /HPF
WBC NRBC COR # BLD AUTO: 13.07 10*3/MM3 (ref 3.4–10.8)

## 2024-09-04 PROCEDURE — 83970 ASSAY OF PARATHORMONE: CPT

## 2024-09-04 PROCEDURE — 84100 ASSAY OF PHOSPHORUS: CPT

## 2024-09-04 PROCEDURE — 80048 BASIC METABOLIC PNL TOTAL CA: CPT

## 2024-09-04 PROCEDURE — 36415 COLL VENOUS BLD VENIPUNCTURE: CPT

## 2024-09-04 PROCEDURE — 85025 COMPLETE CBC W/AUTO DIFF WBC: CPT

## 2024-09-04 PROCEDURE — 84156 ASSAY OF PROTEIN URINE: CPT

## 2024-09-04 PROCEDURE — 81001 URINALYSIS AUTO W/SCOPE: CPT

## 2024-09-04 PROCEDURE — 82570 ASSAY OF URINE CREATININE: CPT

## 2024-09-10 NOTE — PROGRESS NOTES
GYN Problem/Follow Up Visit    Chief Complaint   Patient presents with    Follow-up     Complaints of hot flashes           HPI  Ro Connor is a 69 y.o. female, , who presents for discuss hot flashes, experiencing for the past 6 months, occur during the day when resting, intermittent, last anywhere from 5-20 minutes, no improving or worsening factors. No night sweats. completed menopause around 50. No hot flashes for many years    Hx of chronic kidney disease stage 4, not on dialysis  Hx low t3 elevated ferritin     She is receiving her annual wellness visits/mammogram orders etc through her PCP    Additional OB/GYN History   No LMP recorded. Patient is postmenopausal.  Current contraception: contraceptive methods: Post menopausal status    Past Medical History:   Diagnosis Date    Acid reflux     Anemia     Anxiety     Arthritis     OSTEO    CKD (chronic kidney disease) stage 4, GFR 15-29 ml/min     DDD (degenerative disc disease), cervical     DDD (degenerative disc disease), lumbosacral     DDD (degenerative disc disease), thoracolumbar     Disease of thyroid gland     Hyperlipidemia     Hypertension     Sleep apnea     CPAP      Past Surgical History:   Procedure Laterality Date    BACK SURGERY      BURNED NERVE TO RELIEVE PAIN    CATARACT EXTRACTION      COLONOSCOPY      hemorrhoids, mild diverticulosis    ENDOSCOPY      GASTRIC SLEEVE LAPAROSCOPIC N/A 10/30/2019    Procedure: GASTRIC SLEEVE LAPAROSCOPIC With Hiatal Hernia Repair;  Surgeon: Dane Delgado Jr., MD;  Location: [facility] OR [facility];  Service: Bariatric      Family History   Problem Relation Age of Onset    Obesity Mother     Hypertension Mother     Obesity Brother     Hypertension Brother     Heart attack Brother     Hypertension Brother     Obesity Sister     Hypertension Sister     Stroke Sister     Lung cancer Sister     Hypertension Sister     Stroke Sister     Hypertension Paternal Grandmother     Obesity Maternal  "Grandmother     Hypertension Maternal Grandmother     Cancer Maternal Grandmother         BRAIN    Lung cancer Maternal Grandfather     Malig Hyperthermia Neg Hx     Breast cancer Neg Hx     Uterine cancer Neg Hx     Ovarian cancer Neg Hx     Colon cancer Neg Hx      Allergies as of 10/24/2024 - Reviewed 10/24/2024   Allergen Reaction Noted    Contrast dye (echo or unknown ct/mr) Other (See Comments) 10/22/2019    Buprenorphine Unknown - Low Severity 04/28/2022    Flurbiprofen Other (See Comments) 08/24/2015    Nsaids Other (See Comments) 10/30/2019      The additional following portions of the patient's history were reviewed and updated as appropriate: allergies, current medications, past family history, past medical history, past social history, past surgical history, and problem list.    Review of Systems    See HPI for pertinent ROS    Objective   /85 (BP Location: Left arm, Patient Position: Sitting, Cuff Size: Adult)   Pulse 73   Ht 152.4 cm (60\")   Wt 87.1 kg (192 lb)   BMI 37.50 kg/m²     Physical Exam  Vitals and nursing note reviewed.   Constitutional:       Appearance: Normal appearance. She is well-developed and well-groomed.   Cardiovascular:      Rate and Rhythm: Normal rate.   Pulmonary:      Effort: Pulmonary effort is normal.   Skin:     General: Skin is warm and dry.   Neurological:      Mental Status: She is alert and oriented to person, place, and time.   Psychiatric:         Mood and Affect: Affect normal.         Cognition and Memory: Cognition normal.          Assessment and Plan    Diagnoses and all orders for this visit:    1. Hot flashes (Primary)      Counseling:  HRT R/B/A/SE/E all options including non-FDA approved options discussed w pt.         I suspect the recent onset of hot flashes related to hx CKD, she did have a low t3, normal t4. She agrees that she would not be a good candidate for HRT. She was on prozac in the past however discontinued over 4 months ago, we " discussed use of prozac for hot flash  management off label. She will follow up with her PCP to discuss further evaluation of other metabolic causes of sweats, possibly repeat thyroid function tests. We reviewed lifestyle modifications in managing hot flashes.   Her bp is mildly elevated, follow up with PCP for further evaluation and management of high blood pressure and CKD  Follow Up:  Return if symptoms worsen or fail to improve.        Alesia Denny, APRN  10/24/2024

## 2024-09-19 RX ORDER — CETIRIZINE HYDROCHLORIDE 10 MG/1
10 TABLET ORAL DAILY
Qty: 90 TABLET | Refills: 1 | Status: SHIPPED | OUTPATIENT
Start: 2024-09-19

## 2024-10-24 ENCOUNTER — OFFICE VISIT (OUTPATIENT)
Dept: OBSTETRICS AND GYNECOLOGY | Facility: CLINIC | Age: 69
End: 2024-10-24
Payer: MEDICARE

## 2024-10-24 ENCOUNTER — LAB (OUTPATIENT)
Dept: LAB | Facility: HOSPITAL | Age: 69
End: 2024-10-24
Payer: MEDICARE

## 2024-10-24 ENCOUNTER — TRANSCRIBE ORDERS (OUTPATIENT)
Dept: ADMINISTRATIVE | Facility: HOSPITAL | Age: 69
End: 2024-10-24
Payer: MEDICARE

## 2024-10-24 VITALS
SYSTOLIC BLOOD PRESSURE: 144 MMHG | WEIGHT: 192 LBS | DIASTOLIC BLOOD PRESSURE: 85 MMHG | BODY MASS INDEX: 37.69 KG/M2 | HEIGHT: 60 IN | HEART RATE: 73 BPM

## 2024-10-24 DIAGNOSIS — Z01.89 LABORATORY TEST: ICD-10-CM

## 2024-10-24 DIAGNOSIS — Z13.1 SCREENING FOR DIABETES MELLITUS: ICD-10-CM

## 2024-10-24 DIAGNOSIS — M54.51 VERTEBROGENIC LOW BACK PAIN: ICD-10-CM

## 2024-10-24 DIAGNOSIS — R23.2 HOT FLASHES: Primary | ICD-10-CM

## 2024-10-24 DIAGNOSIS — M54.51 VERTEBROGENIC LOW BACK PAIN: Primary | ICD-10-CM

## 2024-10-24 LAB — HBA1C MFR BLD: 5.1 % (ref 4.8–5.6)

## 2024-10-24 PROCEDURE — 36415 COLL VENOUS BLD VENIPUNCTURE: CPT

## 2024-10-24 PROCEDURE — 83036 HEMOGLOBIN GLYCOSYLATED A1C: CPT

## 2024-10-24 PROCEDURE — 87081 CULTURE SCREEN ONLY: CPT

## 2024-10-25 ENCOUNTER — TELEPHONE (OUTPATIENT)
Dept: OBSTETRICS AND GYNECOLOGY | Facility: CLINIC | Age: 69
End: 2024-10-25
Payer: MEDICARE

## 2024-10-25 LAB — MRSA SPEC QL CULT: NORMAL

## 2024-10-25 RX ORDER — CARVEDILOL 12.5 MG/1
12.5 TABLET ORAL 2 TIMES DAILY WITH MEALS
Qty: 60 TABLET | Refills: 1 | Status: SHIPPED | OUTPATIENT
Start: 2024-10-25

## 2024-10-25 NOTE — TELEPHONE ENCOUNTER
Caller: Ro Connor    Relationship: Self    Best call back number: [phone]    Requested Prescriptions:   Requested Prescriptions     Pending Prescriptions Disp Refills    carvedilol (COREG) 12.5 MG tablet 60 tablet 1     Sig: Take 1 tablet by mouth 2 (Two) Times a Day With Meals.        Pharmacy where request should be sent: [address] [phone] [facility] [phone]      Last office visit with prescribing clinician: 8/26/2024   Last telemedicine visit with prescribing clinician: Visit date not found   Next office visit with prescribing clinician: 11/26/2024       Does the patient have less than a 3 day supply:  [x] Yes  [] No    Would you like a call back once the refill request has been completed: [] Yes [] No    If the office needs to give you a call back, can they leave a voicemail: [] Yes [] No    Faby Almanza Rep   10/25/24 12:00 EDT

## 2024-10-25 NOTE — TELEPHONE ENCOUNTER
A My-Chart message has been sent to the patient for PATIENT ROUNDING with [facility].

## 2024-11-12 RX ORDER — HYDROXYZINE HYDROCHLORIDE 50 MG/1
50 TABLET, FILM COATED ORAL 3 TIMES DAILY PRN
Qty: 270 TABLET | Refills: 1 | Status: SHIPPED | OUTPATIENT
Start: 2024-11-12

## 2024-11-12 NOTE — TELEPHONE ENCOUNTER
Caller: Ro Connor    Relationship: Self    Best call back number [phone]    Requested Prescriptions:   Requested Prescriptions     Pending Prescriptions Disp Refills    hydrOXYzine (ATARAX) 50 MG tablet 270 tablet 1     Sig: Take 1 tablet by mouth 3 (Three) Times a Day As Needed for Itching.        Pharmacy where request should be sent: [address] [phone] [facility] [phone]      Last office visit with prescribing clinician: 8/26/2024   Last telemedicine visit with prescribing clinician: Visit date not found   Next office visit with prescribing clinician: 11/26/2024     Does the patient have less than a 3 day supply:  [x] Yes  [] No    Would you like a call back once the refill request has been completed: [x] Yes [] No    If the office needs to give you a call back, can they leave a voicemail: [x] Yes [] No    Faby Figueroa Rep   11/12/24 11:18 EST

## 2024-11-26 ENCOUNTER — OFFICE VISIT (OUTPATIENT)
Dept: INTERNAL MEDICINE | Facility: CLINIC | Age: 69
End: 2024-11-26
Payer: MEDICARE

## 2024-11-26 VITALS
DIASTOLIC BLOOD PRESSURE: 78 MMHG | RESPIRATION RATE: 16 BRPM | TEMPERATURE: 97 F | SYSTOLIC BLOOD PRESSURE: 132 MMHG | BODY MASS INDEX: 38.52 KG/M2 | OXYGEN SATURATION: 97 % | WEIGHT: 196.2 LBS | HEIGHT: 60 IN | HEART RATE: 68 BPM

## 2024-11-26 DIAGNOSIS — G89.29 CHRONIC BILATERAL LOW BACK PAIN WITHOUT SCIATICA: ICD-10-CM

## 2024-11-26 DIAGNOSIS — M85.89 OSTEOPENIA OF MULTIPLE SITES: ICD-10-CM

## 2024-11-26 DIAGNOSIS — I10 ESSENTIAL HYPERTENSION: Primary | ICD-10-CM

## 2024-11-26 DIAGNOSIS — M54.50 CHRONIC BILATERAL LOW BACK PAIN WITHOUT SCIATICA: ICD-10-CM

## 2024-11-26 DIAGNOSIS — E05.90 HYPERTHYROIDISM: ICD-10-CM

## 2024-11-26 DIAGNOSIS — N18.4 STAGE 4 CHRONIC KIDNEY DISEASE: ICD-10-CM

## 2024-11-26 DIAGNOSIS — G47.33 OSA (OBSTRUCTIVE SLEEP APNEA): ICD-10-CM

## 2024-11-26 DIAGNOSIS — E04.1 THYROID NODULE: ICD-10-CM

## 2024-11-26 DIAGNOSIS — E55.9 VITAMIN D DEFICIENCY: ICD-10-CM

## 2024-11-26 DIAGNOSIS — G47.00 INSOMNIA, UNSPECIFIED TYPE: ICD-10-CM

## 2024-11-26 DIAGNOSIS — Z23 NEED FOR INFLUENZA VACCINATION: ICD-10-CM

## 2024-11-26 DIAGNOSIS — E61.1 IRON DEFICIENCY: ICD-10-CM

## 2024-11-26 DIAGNOSIS — K21.9 GASTROESOPHAGEAL REFLUX DISEASE WITHOUT ESOPHAGITIS: ICD-10-CM

## 2024-11-26 DIAGNOSIS — Z12.31 VISIT FOR SCREENING MAMMOGRAM: ICD-10-CM

## 2024-11-26 DIAGNOSIS — E78.5 HYPERLIPIDEMIA, UNSPECIFIED HYPERLIPIDEMIA TYPE: ICD-10-CM

## 2024-11-26 PROCEDURE — 91320 SARSCV2 VAC 30MCG TRS-SUC IM: CPT | Performed by: NURSE PRACTITIONER

## 2024-11-26 PROCEDURE — 1125F AMNT PAIN NOTED PAIN PRSNT: CPT | Performed by: NURSE PRACTITIONER

## 2024-11-26 PROCEDURE — G0008 ADMIN INFLUENZA VIRUS VAC: HCPCS | Performed by: NURSE PRACTITIONER

## 2024-11-26 PROCEDURE — 90662 IIV NO PRSV INCREASED AG IM: CPT | Performed by: NURSE PRACTITIONER

## 2024-11-26 PROCEDURE — 3075F SYST BP GE 130 - 139MM HG: CPT | Performed by: NURSE PRACTITIONER

## 2024-11-26 PROCEDURE — 99214 OFFICE O/P EST MOD 30 MIN: CPT | Performed by: NURSE PRACTITIONER

## 2024-11-26 PROCEDURE — 1159F MED LIST DOCD IN RCRD: CPT | Performed by: NURSE PRACTITIONER

## 2024-11-26 PROCEDURE — 90480 ADMN SARSCOV2 VAC 1/ONLY CMP: CPT | Performed by: NURSE PRACTITIONER

## 2024-11-26 PROCEDURE — 3078F DIAST BP <80 MM HG: CPT | Performed by: NURSE PRACTITIONER

## 2024-11-26 PROCEDURE — 1160F RVW MEDS BY RX/DR IN RCRD: CPT | Performed by: NURSE PRACTITIONER

## 2024-11-26 RX ORDER — AMLODIPINE BESYLATE 5 MG/1
5 TABLET ORAL DAILY
Qty: 90 TABLET | Refills: 1 | Status: SHIPPED | OUTPATIENT
Start: 2024-11-26

## 2024-11-26 NOTE — PROGRESS NOTES
Chief Complaint  Hypertension, Chronic Kidney Disease, and Hot Flashes    Subjective      Ro Connor is a 69 y.o. female who presents to [facility]     CKD-  Managed by nephrology. Scheduled for labs next month.    GERD-  Well controlled with PPI.    Hyperthyroid-  Patient with history of medication management many years ago.  Also with thyroid nodule, most recent ultrasound 3/2024. Due for annual repeat 3/2025.     Depression/insomnia-  Patient previously started on Prozac, did not find that this was helpful for her so she did not refill. She continues trazodone for sleep.  Patient reports she does not feel like she needs anything at this time. Saw GYN for hot flashes, states these have improved significantly. Did not consider HRT due to potential risks.     HTN-  Managed with Coreg and Lasix.  Patient reports at times she does have to hold her blood pressure medication due to low readings but this is rare.  Readings range from 110s-150s systolic, is most often higher than 130s. Denies chest pain, blurry vision, leg swelling. Did have a headache with a systolic reading of 165.  Called and discussed with nephrology who recommended that patient start on amlodipine.    Iron deficiency-  Managed with iron supplement every other day.  Chronically elevated ferritin, monitored by nephrology.    Vitamin D deficiency-  Continues oral supplement.    HLD-  Continues statin.     SEVEN-  Managed with CPAP.    Chronic pain-  Managed by pain management with Norco and muscle relaxer. Is considering doing the Intracept procedure. Had MRI, bone density, MRSA test and A1c test which were required before pursuing this. She is scheduled to follow up next month to discuss.     Mammogram: 3/2024  DEXA: 3/2023  Colonoscopy: 2019, due 2029  COVID-19 vaccination: Would like  Influenza vaccination: Would like  Pneumonia vaccination: Up to date    Objective   Vital Signs:   Vitals:     "11/26/24 1240   BP: 132/78   BP Location: Left arm   Patient Position: Sitting   Cuff Size: Large Adult   Pulse: 68   Resp: 16   Temp: 97 °F (36.1 °C)   TempSrc: Temporal   SpO2: 97%   Weight: 89 kg (196 lb 3.2 oz)   Height: 152.4 cm (60\")     Body mass index is 38.32 kg/m².    Wt Readings from Last 3 Encounters:   11/26/24 89 kg (196 lb 3.2 oz)   10/24/24 87.1 kg (192 lb)   08/26/24 86.8 kg (191 lb 6.4 oz)     BP Readings from Last 3 Encounters:   11/26/24 132/78   10/24/24 144/85   08/26/24 134/76       Health Maintenance   Topic Date Due    ZOSTER VACCINE (1 of 2) Never done    INFLUENZA VACCINE  07/01/2024    COVID-19 Vaccine (4 - 2024-25 season) 09/01/2024    ANNUAL WELLNESS VISIT  02/16/2025    BMI FOLLOWUP  02/16/2025    DXA SCAN  03/13/2025    LIPID PANEL  07/24/2025    MAMMOGRAM  03/29/2026    TDAP/TD VACCINES (2 - Td or Tdap) 12/29/2027    COLORECTAL CANCER SCREENING  04/22/2029    HEPATITIS C SCREENING  Completed    Pneumococcal Vaccine 65+  Completed    PAP SMEAR  Discontinued       Physical Exam  Constitutional:       Appearance: Normal appearance.   HENT:      Head: Normocephalic and atraumatic.      Nose: Nose normal.      Mouth/Throat:      Mouth: Mucous membranes are moist.      Pharynx: Oropharynx is clear.   Eyes:      Extraocular Movements: Extraocular movements intact.      Conjunctiva/sclera: Conjunctivae normal.      Pupils: Pupils are equal, round, and reactive to light.   Neck:      Thyroid: No thyroid mass, thyromegaly or thyroid tenderness.   Cardiovascular:      Rate and Rhythm: Normal rate and regular rhythm.      Heart sounds: Normal heart sounds.   Pulmonary:      Effort: Pulmonary effort is normal.      Breath sounds: Normal breath sounds.   Skin:     General: Skin is warm and dry.   Neurological:      General: No focal deficit present.      Mental Status: She is alert and oriented to person, place, and time.   Psychiatric:         Mood and Affect: Mood normal.         Behavior: " Behavior normal.         Thought Content: Thought content normal.          Result Review :  The following data was reviewed by: MAYUR Nelson on 11/26/2024:         Procedures          Assessment & Plan  Essential hypertension  Consistent elevations.  Continue Coreg, Lasix.  Will add amlodipine.  She will continue to monitor closely and notify clinic with consistent elevations or lows.  She will follow up with nephrology as scheduled to discuss adjustments.  Labs ordered to be drawn with nephrology labs next month.    Orders:    CBC w AUTO Differential; Future    Stage 4 chronic kidney disease  Continue to follow with nephrology as scheduled.         Iron deficiency  Continue iron supplement, iron studies with labs.  Orders:    Iron and TIBC; Future    Ferritin; Future    Hyperthyroidism  Repeat thyroid labs.  Orders:    TSH; Future    T4, free; Future    Thyroid nodule  Thyroid ultrasound ordered.  Orders:    US Thyroid; Future    Hyperlipidemia, unspecified hyperlipidemia type  Continue statin, lipid panel with labs.    Orders:    Lipid panel; Future    Vitamin D deficiency  Continue supplement, vitamin D level labs.    Orders:    Vitamin D 25 hydroxy; Future    Chronic bilateral low back pain without sciatica  She will continue to follow with pain management for medication management and intervention.       Gastroesophageal reflux disease without esophagitis  Well controlled, continue PPI.        SEVEN (obstructive sleep apnea)  Continue CPAP.        Insomnia, unspecified type  Well controlled, continue trazodone.        Visit for screening mammogram  Mammogram ordered.     Orders:    Mammo Screening Digital Tomosynthesis Bilateral With CAD; Future    Osteopenia of multiple sites  DEXA ordered.  Orders:    DEXA Bone Density Axial; Future    Need for influenza vaccination    Orders:    Fluzone High-Dose 65+yrs ([id number])         FOLLOW UP  Return in about 2 months (around 1/26/2025).  Patient was given  instructions and counseling regarding her condition or for health maintenance advice. Please see specific information pulled into the AVS if appropriate.       Maria Elena Mullins, APRN  11/26/24  13:50 EST    CURRENT & DISCONTINUED MEDICATIONS  Current Outpatient Medications   Medication Instructions    amLODIPine (NORVASC) 5 mg, Oral, Daily    Calcium Carbonate (CALCIUM 500 PO) 500 mg, 2 Times Daily    carvedilol (COREG) 12.5 mg, Oral, 2 Times Daily With Meals    cetirizine (ZYRTEC) 10 mg, Oral, Daily    Co Q-10 300 mg, Daily    cyclobenzaprine (FLEXERIL) 10 mg, Oral, 3 Times Daily PRN    ferrous sulfate (SV IRON) 325 mg, Oral, Daily    furosemide (LASIX) 20 mg, Oral, Daily    HYDROcodone-acetaminophen (NORCO) 7.5-325 MG per tablet 1 tablet, Every 6 Hours PRN    hydrOXYzine (ATARAX) 50 mg, Oral, 3 Times Daily PRN    melatonin 1 MG tablet 1 tablet    pantoprazole (PROTONIX) 40 mg, Oral, Daily    Pediatric Multivitamins-Iron (Flintstones Complete) 18 MG chewable tablet chewable tablet [employer]limart multi vitamin one oral one daily.   Active    Probiotic Product (FORTIFY PROBIOTIC WOMENS PO) Take  by mouth.    simvastatin (ZOCOR) 40 mg, Oral, Nightly    traZODone (DESYREL) 100 mg, Oral, Nightly       Medications Discontinued During This Encounter   Medication Reason    FLUoxetine (PROzac) 10 MG capsule

## 2024-11-26 NOTE — ASSESSMENT & PLAN NOTE
Consistent elevations.  Continue Coreg, Lasix.  Will add amlodipine.  She will continue to monitor closely and notify clinic with consistent elevations or lows.  She will follow up with nephrology as scheduled to discuss adjustments.  Labs ordered to be drawn with nephrology labs next month.    Orders:    CBC w AUTO Differential; Future

## 2024-11-26 NOTE — ASSESSMENT & PLAN NOTE
Continue iron supplement, iron studies with labs.  Orders:    Iron and TIBC; Future    Ferritin; Future

## 2024-12-10 ENCOUNTER — LAB (OUTPATIENT)
Facility: HOSPITAL | Age: 69
End: 2024-12-10
Payer: MEDICARE

## 2024-12-10 ENCOUNTER — TRANSCRIBE ORDERS (OUTPATIENT)
Dept: ADMINISTRATIVE | Facility: HOSPITAL | Age: 69
End: 2024-12-10
Payer: MEDICARE

## 2024-12-10 DIAGNOSIS — E61.1 IRON DEFICIENCY: ICD-10-CM

## 2024-12-10 DIAGNOSIS — N18.4 CHRONIC KIDNEY DISEASE, STAGE IV (SEVERE): ICD-10-CM

## 2024-12-10 DIAGNOSIS — N18.4 CKD (CHRONIC KIDNEY DISEASE), STAGE IV: Primary | ICD-10-CM

## 2024-12-10 DIAGNOSIS — E05.90 HYPERTHYROIDISM: ICD-10-CM

## 2024-12-10 DIAGNOSIS — E55.9 VITAMIN D DEFICIENCY: ICD-10-CM

## 2024-12-10 DIAGNOSIS — I10 ESSENTIAL HYPERTENSION: ICD-10-CM

## 2024-12-10 DIAGNOSIS — N18.4 CHRONIC KIDNEY DISEASE, STAGE IV (SEVERE): Primary | ICD-10-CM

## 2024-12-10 DIAGNOSIS — E78.5 HYPERLIPIDEMIA, UNSPECIFIED HYPERLIPIDEMIA TYPE: ICD-10-CM

## 2024-12-10 LAB
25(OH)D3 SERPL-MCNC: 69 NG/ML (ref 30–100)
ALBUMIN SERPL-MCNC: 3.7 G/DL (ref 3.5–5.2)
ANION GAP SERPL CALCULATED.3IONS-SCNC: 13.5 MMOL/L (ref 5–15)
BACTERIA UR QL AUTO: ABNORMAL /HPF
BASOPHILS # BLD AUTO: 0.06 10*3/MM3 (ref 0–0.2)
BASOPHILS NFR BLD AUTO: 0.5 % (ref 0–1.5)
BILIRUB UR QL STRIP: NEGATIVE
BUN SERPL-MCNC: 69 MG/DL (ref 8–23)
BUN/CREAT SERPL: 21.3 (ref 7–25)
CALCIUM SPEC-SCNC: 9 MG/DL (ref 8.6–10.5)
CHLORIDE SERPL-SCNC: 106 MMOL/L (ref 98–107)
CHOLEST SERPL-MCNC: 133 MG/DL (ref 0–200)
CLARITY UR: CLEAR
CO2 SERPL-SCNC: 21.5 MMOL/L (ref 22–29)
COLOR UR: YELLOW
CREAT SERPL-MCNC: 3.24 MG/DL (ref 0.57–1)
CREAT UR-MCNC: 30.5 MG/DL
DEPRECATED RDW RBC AUTO: 45.9 FL (ref 37–54)
EGFRCR SERPLBLD CKD-EPI 2021: 14.9 ML/MIN/1.73
EOSINOPHIL # BLD AUTO: 0.38 10*3/MM3 (ref 0–0.4)
EOSINOPHIL NFR BLD AUTO: 3.2 % (ref 0.3–6.2)
ERYTHROCYTE [DISTWIDTH] IN BLOOD BY AUTOMATED COUNT: 13.4 % (ref 12.3–15.4)
FERRITIN SERPL-MCNC: 704 NG/ML (ref 13–150)
GLUCOSE SERPL-MCNC: 91 MG/DL (ref 65–99)
GLUCOSE UR STRIP-MCNC: NEGATIVE MG/DL
HCT VFR BLD AUTO: 32.8 % (ref 34–46.6)
HDLC SERPL-MCNC: 58 MG/DL (ref 40–60)
HGB BLD-MCNC: 10.6 G/DL (ref 12–15.9)
HGB UR QL STRIP.AUTO: NEGATIVE
HYALINE CASTS UR QL AUTO: ABNORMAL /LPF
IMM GRANULOCYTES # BLD AUTO: 0.07 10*3/MM3 (ref 0–0.05)
IMM GRANULOCYTES NFR BLD AUTO: 0.6 % (ref 0–0.5)
IRON 24H UR-MRATE: 84 MCG/DL (ref 37–145)
IRON SATN MFR SERPL: 29 % (ref 20–50)
KETONES UR QL STRIP: NEGATIVE
LDLC SERPL CALC-MCNC: 61 MG/DL (ref 0–100)
LDLC/HDLC SERPL: 1.06 {RATIO}
LEUKOCYTE ESTERASE UR QL STRIP.AUTO: ABNORMAL
LYMPHOCYTES # BLD AUTO: 3.28 10*3/MM3 (ref 0.7–3.1)
LYMPHOCYTES NFR BLD AUTO: 28 % (ref 19.6–45.3)
MCH RBC QN AUTO: 30.5 PG (ref 26.6–33)
MCHC RBC AUTO-ENTMCNC: 32.3 G/DL (ref 31.5–35.7)
MCV RBC AUTO: 94.5 FL (ref 79–97)
MONOCYTES # BLD AUTO: 0.97 10*3/MM3 (ref 0.1–0.9)
MONOCYTES NFR BLD AUTO: 8.3 % (ref 5–12)
NEUTROPHILS NFR BLD AUTO: 59.4 % (ref 42.7–76)
NEUTROPHILS NFR BLD AUTO: 6.95 10*3/MM3 (ref 1.7–7)
NITRITE UR QL STRIP: POSITIVE
NRBC BLD AUTO-RTO: 0 /100 WBC (ref 0–0.2)
PH UR STRIP.AUTO: 6 [PH] (ref 5–8)
PHOSPHATE SERPL-MCNC: 5 MG/DL (ref 2.5–4.5)
PLATELET # BLD AUTO: 258 10*3/MM3 (ref 140–450)
PMV BLD AUTO: 11 FL (ref 6–12)
POTASSIUM SERPL-SCNC: 4.7 MMOL/L (ref 3.5–5.2)
PROT ?TM UR-MCNC: 7.8 MG/DL
PROT UR QL STRIP: NEGATIVE
PROT/CREAT UR: 0.26 MG/G{CREAT}
PTH-INTACT SERPL-MCNC: 149 PG/ML (ref 15–65)
RBC # BLD AUTO: 3.47 10*6/MM3 (ref 3.77–5.28)
RBC # UR STRIP: ABNORMAL /HPF
REF LAB TEST METHOD: ABNORMAL
SODIUM SERPL-SCNC: 141 MMOL/L (ref 136–145)
SP GR UR STRIP: 1.01 (ref 1–1.03)
SQUAMOUS #/AREA URNS HPF: ABNORMAL /HPF
T4 FREE SERPL-MCNC: 1.1 NG/DL (ref 0.92–1.68)
TIBC SERPL-MCNC: 289 MCG/DL (ref 298–536)
TRANSFERRIN SERPL-MCNC: 194 MG/DL (ref 200–360)
TRIGL SERPL-MCNC: 69 MG/DL (ref 0–150)
TSH SERPL DL<=0.05 MIU/L-ACNC: 2.68 UIU/ML (ref 0.27–4.2)
UROBILINOGEN UR QL STRIP: ABNORMAL
VLDLC SERPL-MCNC: 14 MG/DL (ref 5–40)
WBC # UR STRIP: ABNORMAL /HPF
WBC NRBC COR # BLD AUTO: 11.71 10*3/MM3 (ref 3.4–10.8)

## 2024-12-10 PROCEDURE — 85025 COMPLETE CBC W/AUTO DIFF WBC: CPT

## 2024-12-10 PROCEDURE — 81001 URINALYSIS AUTO W/SCOPE: CPT

## 2024-12-10 PROCEDURE — 83970 ASSAY OF PARATHORMONE: CPT

## 2024-12-10 PROCEDURE — 80069 RENAL FUNCTION PANEL: CPT

## 2024-12-10 PROCEDURE — 82728 ASSAY OF FERRITIN: CPT

## 2024-12-10 PROCEDURE — 84156 ASSAY OF PROTEIN URINE: CPT

## 2024-12-10 PROCEDURE — 84443 ASSAY THYROID STIM HORMONE: CPT

## 2024-12-10 PROCEDURE — 80061 LIPID PANEL: CPT

## 2024-12-10 PROCEDURE — 83540 ASSAY OF IRON: CPT

## 2024-12-10 PROCEDURE — 84439 ASSAY OF FREE THYROXINE: CPT

## 2024-12-10 PROCEDURE — 84466 ASSAY OF TRANSFERRIN: CPT

## 2024-12-10 PROCEDURE — 82570 ASSAY OF URINE CREATININE: CPT

## 2024-12-10 PROCEDURE — 82306 VITAMIN D 25 HYDROXY: CPT

## 2024-12-10 PROCEDURE — 36415 COLL VENOUS BLD VENIPUNCTURE: CPT

## 2025-01-13 ENCOUNTER — LAB (OUTPATIENT)
Facility: HOSPITAL | Age: 70
End: 2025-01-13
Payer: MEDICARE

## 2025-01-13 ENCOUNTER — TRANSCRIBE ORDERS (OUTPATIENT)
Dept: ADMINISTRATIVE | Facility: HOSPITAL | Age: 70
End: 2025-01-13
Payer: MEDICARE

## 2025-01-13 DIAGNOSIS — N18.4 CHRONIC KIDNEY DISEASE, STAGE IV (SEVERE): Primary | ICD-10-CM

## 2025-01-13 DIAGNOSIS — N18.4 CHRONIC KIDNEY DISEASE, STAGE IV (SEVERE): ICD-10-CM

## 2025-01-13 DIAGNOSIS — N18.4 CKD (CHRONIC KIDNEY DISEASE), STAGE IV: ICD-10-CM

## 2025-01-13 LAB
25(OH)D3 SERPL-MCNC: 61.7 NG/ML (ref 30–100)
ALBUMIN SERPL-MCNC: 3.7 G/DL (ref 3.5–5.2)
ANION GAP SERPL CALCULATED.3IONS-SCNC: 12 MMOL/L (ref 5–15)
BACTERIA UR QL AUTO: ABNORMAL /HPF
BASOPHILS # BLD AUTO: 0.07 10*3/MM3 (ref 0–0.2)
BASOPHILS NFR BLD AUTO: 0.7 % (ref 0–1.5)
BILIRUB UR QL STRIP: NEGATIVE
BUN SERPL-MCNC: 58 MG/DL (ref 8–23)
BUN/CREAT SERPL: 18 (ref 7–25)
CALCIUM SPEC-SCNC: 8.9 MG/DL (ref 8.6–10.5)
CHLORIDE SERPL-SCNC: 104 MMOL/L (ref 98–107)
CLARITY UR: ABNORMAL
CO2 SERPL-SCNC: 21 MMOL/L (ref 22–29)
COLOR UR: YELLOW
CREAT SERPL-MCNC: 3.23 MG/DL (ref 0.57–1)
CREAT UR-MCNC: 25.9 MG/DL
DEPRECATED RDW RBC AUTO: 43 FL (ref 37–54)
EGFRCR SERPLBLD CKD-EPI 2021: 15 ML/MIN/1.73
EOSINOPHIL # BLD AUTO: 0.43 10*3/MM3 (ref 0–0.4)
EOSINOPHIL NFR BLD AUTO: 4.5 % (ref 0.3–6.2)
ERYTHROCYTE [DISTWIDTH] IN BLOOD BY AUTOMATED COUNT: 13 % (ref 12.3–15.4)
GLUCOSE SERPL-MCNC: 89 MG/DL (ref 65–99)
GLUCOSE UR STRIP-MCNC: NEGATIVE MG/DL
HCT VFR BLD AUTO: 29.7 % (ref 34–46.6)
HGB BLD-MCNC: 10 G/DL (ref 12–15.9)
HGB UR QL STRIP.AUTO: ABNORMAL
HYALINE CASTS UR QL AUTO: ABNORMAL /LPF
IMM GRANULOCYTES # BLD AUTO: 0.04 10*3/MM3 (ref 0–0.05)
IMM GRANULOCYTES NFR BLD AUTO: 0.4 % (ref 0–0.5)
KETONES UR QL STRIP: NEGATIVE
LEUKOCYTE ESTERASE UR QL STRIP.AUTO: ABNORMAL
LYMPHOCYTES # BLD AUTO: 2.72 10*3/MM3 (ref 0.7–3.1)
LYMPHOCYTES NFR BLD AUTO: 28.2 % (ref 19.6–45.3)
MCH RBC QN AUTO: 31 PG (ref 26.6–33)
MCHC RBC AUTO-ENTMCNC: 33.7 G/DL (ref 31.5–35.7)
MCV RBC AUTO: 92 FL (ref 79–97)
MONOCYTES # BLD AUTO: 0.88 10*3/MM3 (ref 0.1–0.9)
MONOCYTES NFR BLD AUTO: 9.1 % (ref 5–12)
NEUTROPHILS NFR BLD AUTO: 5.52 10*3/MM3 (ref 1.7–7)
NEUTROPHILS NFR BLD AUTO: 57.1 % (ref 42.7–76)
NITRITE UR QL STRIP: NEGATIVE
NRBC BLD AUTO-RTO: 0 /100 WBC (ref 0–0.2)
PH UR STRIP.AUTO: 6 [PH] (ref 5–8)
PHOSPHATE SERPL-MCNC: 5.7 MG/DL (ref 2.5–4.5)
PLATELET # BLD AUTO: 219 10*3/MM3 (ref 140–450)
PMV BLD AUTO: 11.9 FL (ref 6–12)
POTASSIUM SERPL-SCNC: 4.6 MMOL/L (ref 3.5–5.2)
PROT ?TM UR-MCNC: 13.2 MG/DL
PROT ?TM UR-MCNC: 5.2 MG/DL
PROT UR QL STRIP: NEGATIVE
PROT/CREAT UR: 0.51 MG/G{CREAT}
PTH-INTACT SERPL-MCNC: 200 PG/ML (ref 15–65)
RBC # BLD AUTO: 3.23 10*6/MM3 (ref 3.77–5.28)
RBC # UR STRIP: ABNORMAL /HPF
REF LAB TEST METHOD: ABNORMAL
SODIUM SERPL-SCNC: 137 MMOL/L (ref 136–145)
SP GR UR STRIP: 1.01 (ref 1–1.03)
SQUAMOUS #/AREA URNS HPF: ABNORMAL /HPF
UROBILINOGEN UR QL STRIP: ABNORMAL
WBC # UR STRIP: ABNORMAL /HPF
WBC NRBC COR # BLD AUTO: 9.66 10*3/MM3 (ref 3.4–10.8)

## 2025-01-13 PROCEDURE — 80069 RENAL FUNCTION PANEL: CPT

## 2025-01-13 PROCEDURE — 36415 COLL VENOUS BLD VENIPUNCTURE: CPT

## 2025-01-13 PROCEDURE — 81001 URINALYSIS AUTO W/SCOPE: CPT

## 2025-01-13 PROCEDURE — 85025 COMPLETE CBC W/AUTO DIFF WBC: CPT

## 2025-01-13 PROCEDURE — 83970 ASSAY OF PARATHORMONE: CPT

## 2025-01-13 PROCEDURE — 82306 VITAMIN D 25 HYDROXY: CPT

## 2025-01-13 PROCEDURE — 84156 ASSAY OF PROTEIN URINE: CPT

## 2025-01-13 PROCEDURE — 82570 ASSAY OF URINE CREATININE: CPT

## 2025-01-17 DIAGNOSIS — N18.6 ESRD (END STAGE RENAL DISEASE): Primary | ICD-10-CM

## 2025-01-20 ENCOUNTER — OFFICE VISIT (OUTPATIENT)
Age: 70
End: 2025-01-20
Payer: MEDICARE

## 2025-01-20 ENCOUNTER — HOSPITAL ENCOUNTER (OUTPATIENT)
Facility: HOSPITAL | Age: 70
Discharge: HOME | End: 2025-01-20
Admitting: SURGERY
Payer: MEDICARE

## 2025-01-20 VITALS
SYSTOLIC BLOOD PRESSURE: 146 MMHG | OXYGEN SATURATION: 95 % | DIASTOLIC BLOOD PRESSURE: 99 MMHG | BODY MASS INDEX: 39.09 KG/M2 | RESPIRATION RATE: 16 BRPM | HEIGHT: 60 IN | HEART RATE: 80 BPM | TEMPERATURE: 97.1 F | WEIGHT: 199.1 LBS

## 2025-01-20 DIAGNOSIS — N18.4 CKD (CHRONIC KIDNEY DISEASE) STAGE 4, GFR 15-29 ML/MIN: Primary | ICD-10-CM

## 2025-01-20 DIAGNOSIS — N18.6 ESRD (END STAGE RENAL DISEASE): ICD-10-CM

## 2025-01-20 LAB
BH CV UPPER VENOUS LEFT AXILLARY COMPRESS: NORMAL
BH CV UPPER VENOUS LEFT BASILIC FOREARM COMPRESS: NORMAL
BH CV UPPER VENOUS LEFT BASILIC UPPER COMPRESS: NORMAL
BH CV UPPER VENOUS LEFT BRACHIAL COMPRESS: NORMAL
BH CV UPPER VENOUS LEFT CEPHALIC FOREARM COMPRESS: NORMAL
BH CV UPPER VENOUS LEFT CEPHALIC UPPER COMPRESS: NORMAL
BH CV UPPER VENOUS LEFT INTERNAL JUGULAR AUGMENT: NORMAL
BH CV UPPER VENOUS LEFT INTERNAL JUGULAR COMPRESS: NORMAL
BH CV UPPER VENOUS LEFT INTERNAL JUGULAR PHASIC: NORMAL
BH CV UPPER VENOUS LEFT INTERNAL JUGULAR SPONT: NORMAL
BH CV UPPER VENOUS LEFT RADIAL COMPRESS: NORMAL
BH CV UPPER VENOUS LEFT SUBCLAVIAN AUGMENT: NORMAL
BH CV UPPER VENOUS LEFT SUBCLAVIAN COMPRESS: NORMAL
BH CV UPPER VENOUS LEFT SUBCLAVIAN PHASIC: NORMAL
BH CV UPPER VENOUS LEFT SUBCLAVIAN SPONT: NORMAL
BH CV UPPER VENOUS LEFT ULNAR COMPRESS: NORMAL
BH CV UPPER VENOUS RIGHT AXILLARY COMPRESS: NORMAL
BH CV UPPER VENOUS RIGHT BASILIC FOREARM COMPRESS: NORMAL
BH CV UPPER VENOUS RIGHT BASILIC UPPER COMPRESS: NORMAL
BH CV UPPER VENOUS RIGHT BRACHIAL COMPRESS: NORMAL
BH CV UPPER VENOUS RIGHT CEPHALIC FOREARM COMPRESS: NORMAL
BH CV UPPER VENOUS RIGHT CEPHALIC UPPER COMPRESS: NORMAL
BH CV UPPER VENOUS RIGHT INTERNAL JUGULAR AUGMENT: NORMAL
BH CV UPPER VENOUS RIGHT INTERNAL JUGULAR COMPRESS: NORMAL
BH CV UPPER VENOUS RIGHT INTERNAL JUGULAR PHASIC: NORMAL
BH CV UPPER VENOUS RIGHT INTERNAL JUGULAR SPONT: NORMAL
BH CV UPPER VENOUS RIGHT RADIAL COMPRESS: NORMAL
BH CV UPPER VENOUS RIGHT SUBCLAVIAN AUGMENT: NORMAL
BH CV UPPER VENOUS RIGHT SUBCLAVIAN COMPRESS: NORMAL
BH CV UPPER VENOUS RIGHT SUBCLAVIAN PHASIC: NORMAL
BH CV UPPER VENOUS RIGHT SUBCLAVIAN SPONT: NORMAL
BH CV UPPER VENOUS RIGHT ULNAR COMPRESS: NORMAL
BH CV VAS MEAS BASILIC ANTECUBITAL FOSSA LEFT: 0.18 CM
BH CV VAS MEAS BASILIC ANTECUBITAL FOSSA RIGHT: 0.21 CM
BH CV VAS MEAS BASILIC FOREARM LEFT - PROX: 0.15 CM
BH CV VAS MEAS BASILIC UPPER ARM LEFT - DIST: 0.34 CM
BH CV VAS MEAS BASILIC UPPER ARM LEFT - MID: 0.38 CM
BH CV VAS MEAS BASILIC UPPER ARM LEFT - PROX: 0.52 CM
BH CV VAS MEAS BASILIC UPPER ARM RIGHT - DIST: 0.28 CM
BH CV VAS MEAS BASILIC UPPER ARM RIGHT - MID: 0.3 CM
BH CV VAS MEAS BASILIC UPPER ARM RIGHT - PROX: 0.51 CM
BH CV VAS MEAS CEPHALIC ANTECUBITAL FOSSA LEFT: 0.48 CM
BH CV VAS MEAS CEPHALIC ANTECUBITAL FOSSA RIGHT: 0.57 CM
BH CV VAS MEAS CEPHALIC FOREARM LEFT - DIST: 0.21 CM
BH CV VAS MEAS CEPHALIC FOREARM LEFT - MID: 0.26 CM
BH CV VAS MEAS CEPHALIC FOREARM LEFT - PROX: 0.27 CM
BH CV VAS MEAS CEPHALIC FOREARM RIGHT - DIST: 0.15 CM
BH CV VAS MEAS CEPHALIC FOREARM RIGHT - MID: 0.16 CM
BH CV VAS MEAS CEPHALIC FOREARM RIGHT - PROX: 0.24 CM
BH CV VAS MEAS CEPHALIC UPPER ARM LEFT - DIST: 0.44 CM
BH CV VAS MEAS CEPHALIC UPPER ARM LEFT - MID: 0.44 CM
BH CV VAS MEAS CEPHALIC UPPER ARM LEFT - PROX: 0.41 CM
BH CV VAS MEAS CEPHALIC UPPER ARM RIGHT - DIST: 0.36 CM
BH CV VAS MEAS CEPHALIC UPPER ARM RIGHT - MID: 0.32 CM
BH CV VAS MEAS CEPHALIC UPPER ARM RIGHT - PROX: 0.35 CM

## 2025-01-20 PROCEDURE — 1159F MED LIST DOCD IN RCRD: CPT | Performed by: SURGERY

## 2025-01-20 PROCEDURE — 3080F DIAST BP >= 90 MM HG: CPT | Performed by: SURGERY

## 2025-01-20 PROCEDURE — 1160F RVW MEDS BY RX/DR IN RCRD: CPT | Performed by: SURGERY

## 2025-01-20 PROCEDURE — 93985 DUP-SCAN HEMO COMPL BI STD: CPT

## 2025-01-20 PROCEDURE — 93985 DUP-SCAN HEMO COMPL BI STD: CPT | Performed by: SURGERY

## 2025-01-20 PROCEDURE — 99204 OFFICE O/P NEW MOD 45 MIN: CPT | Performed by: SURGERY

## 2025-01-20 PROCEDURE — 3077F SYST BP >= 140 MM HG: CPT | Performed by: SURGERY

## 2025-01-20 NOTE — ASSESSMENT & PLAN NOTE
Orders:    Case Request; Standing    ceFAZolin (ANCEF) 2,000 mg in sodium chloride 0.9 % 100 mL IVPB    Case Request

## 2025-01-20 NOTE — PROGRESS NOTES
"Chief Complaint  ESRD (Vein mapping)    Subjective          HPI: Ro Connor presents to [facility] who is referred for evaluation regarding AV fistula placement.  She follows with Dr. John Baum.  She has never been on dialysis before.  She believes her kidney dysfunction is primarily related to NSAID use.  Not a diabetic.  History of Present Illness      Review of Systems     History Review Reviewed Comments   Past Medical History:  [x]  CKD 4/5    Past Surgical History: [x]  PD catheter placement (Dr. Mesa), gastric sleeve   Family History: [x]     Social History: [x]  Non-smoker     Objective   Vital Signs:  /99   Pulse 80   Temp 97.1 °F (36.2 °C) (Temporal)   Resp 16   Ht 152.4 cm (60\")   Wt 90.3 kg (199 lb 1.6 oz)   SpO2 95%   BMI 38.88 kg/m²   Estimated body mass index is 38.88 kg/m² as calculated from the following:    Height as of this encounter: 152.4 cm (60\").    Weight as of this encounter: 90.3 kg (199 lb 1.6 oz).              Physical Exam  Physical Exam  Patient is obese.  No pacemakers or defibrillators.  She is right-handed.  No obvious adverse event for the creation of the fistula.  Brachial and radial pulses are palpable.        Result Review :    Common labs          10/24/2024    12:58 12/10/2024    13:38 1/13/2025    14:11   Common Labs   Glucose  91  89    BUN  69  58    Creatinine  3.24  3.23    Sodium  141  137    Potassium  4.7  4.6    Chloride  106  104    Calcium  9.0  8.9    Albumin  3.7  3.7    WBC  11.71  9.66    Hemoglobin  10.6  10.0    Hematocrit  32.8  29.7    Platelets  258  219    Total Cholesterol  133     Triglycerides  69     HDL Cholesterol  58     LDL Cholesterol   61     Hemoglobin A1C 5.10        Results       Most notable findings include: BUN 58, creatinine 3.23.  Hemoglobin 10.  Platelet count 219.  Hemoglobin A1c 5.1.  LDL 61.    Images reviewed:  Duplex Initial Vein Mapping Hemodialysis Access Bilateral CAR " (01/17/2025 13:16) adequate nondominant left arm cephalic vein      Risks:  Patient has been recommended for hemodialysis access surgery.  This is a major surgery.  Patient understands the inherent risks associated with hemodialysis access surgery.  These include but not are not limited to stroke, heart attack, bleeding, infection, need for secondary surgery/intervention, hemodialysis access steal phenomenon, failure to mature, and even death.  Patient also understands the nature of hemodialysis access and chronic kidney disease/end-stage renal disease(a chronic illness that has progressed to requiring surgical dialysis access creation).        Ro Connor  reports that she has never smoked. She has never used smokeless tobacco..        Patient or patient representative verbalized consent for the use of Ambient Listening during the visit with  Aiden Richards MD for chart documentation. 1/20/2025  15:12 EST        Assessment and Plan     Assessment & Plan  CKD (chronic kidney disease) stage 4, GFR 15-29 ml/min      Orders:    Case Request; Standing    ceFAZolin (ANCEF) 2,000 mg in sodium chloride 0.9 % 100 mL IVPB    Case Request       Assessment & Plan      69-year-old woman referred by nephrology for an evaluation for advanced chronic kidney disease and for consideration for AV fistula placement.  Patient has not yet seen general surgery but does plan PD to be her primary method for dialysis.  She has never had any vascular access surgery before.  She has has a history of a gastric sleeve.  She is right-handed without pacemakers or defibrillators.  Vein mapping worksheet and images were reviewed.  It looks like she has adequate left arm cephalic vein for an upper arm AV fistula.  This vein is frequently cannulated for blood draws but looks compressible on images.  We recommended a left arm AV fistula under monitored anesthesia with a regional block.  We discussed the risk benefits, and alternatives,  associated with the procedure and all of her questions were asked and answered.  She agrees to proceed.       Follow Up     Return for After Procedure.  Patient was given instructions and counseling regarding her condition or for health maintenance advice. Please see specific information pulled into the AVS if appropriate.

## 2025-01-20 NOTE — H&P (VIEW-ONLY)
"Chief Complaint  ESRD (Vein mapping)    Subjective          HPI: Ro Connor presents to [facility]ASC[facility] who is referred for evaluation regarding AV fistula placement.  She follows with Dr. John Baum.  She has never been on dialysis before.  She believes her kidney dysfunction is primarily related to NSAID use.  Not a diabetic.  History of Present Illness      Review of Systems     History Review Reviewed Comments   Past Medical History:  [x]  CKD 4/5    Past Surgical History: [x]  PD catheter placement (Dr. Mesa), gastric sleeve   Family History: [x]     Social History: [x]  Non-smoker     Objective   Vital Signs:  /99   Pulse 80   Temp 97.1 °F (36.2 °C) (Temporal)   Resp 16   Ht 152.4 cm (60\")   Wt 90.3 kg (199 lb 1.6 oz)   SpO2 95%   BMI 38.88 kg/m²   Estimated body mass index is 38.88 kg/m² as calculated from the following:    Height as of this encounter: 152.4 cm (60\").    Weight as of this encounter: 90.3 kg (199 lb 1.6 oz).              Physical Exam  Physical Exam  Patient is obese.  No pacemakers or defibrillators.  She is right-handed.  No obvious adverse event for the creation of the fistula.  Brachial and radial pulses are palpable.        Result Review :    Common labs          10/24/2024    12:58 12/10/2024    13:38 1/13/2025    14:11   Common Labs   Glucose  91  89    BUN  69  58    Creatinine  3.24  3.23    Sodium  141  137    Potassium  4.7  4.6    Chloride  106  104    Calcium  9.0  8.9    Albumin  3.7  3.7    WBC  11.71  9.66    Hemoglobin  10.6  10.0    Hematocrit  32.8  29.7    Platelets  258  219    Total Cholesterol  133     Triglycerides  69     HDL Cholesterol  58     LDL Cholesterol   61     Hemoglobin A1C 5.10        Results       Most notable findings include: BUN 58, creatinine 3.23.  Hemoglobin 10.  Platelet count 219.  Hemoglobin A1c 5.1.  LDL 61.    Images reviewed:  Duplex Initial Vein Mapping Hemodialysis Access Bilateral CAR " (01/17/2025 13:16) adequate nondominant left arm cephalic vein      Risks:  Patient has been recommended for hemodialysis access surgery.  This is a major surgery.  Patient understands the inherent risks associated with hemodialysis access surgery.  These include but not are not limited to stroke, heart attack, bleeding, infection, need for secondary surgery/intervention, hemodialysis access steal phenomenon, failure to mature, and even death.  Patient also understands the nature of hemodialysis access and chronic kidney disease/end-stage renal disease(a chronic illness that has progressed to requiring surgical dialysis access creation).        Ro Connor  reports that she has never smoked. She has never used smokeless tobacco..        Patient or patient representative verbalized consent for the use of Ambient Listening during the visit with  Aiden Richards MD for chart documentation. 1/20/2025  15:12 EST        Assessment and Plan     Assessment & Plan  CKD (chronic kidney disease) stage 4, GFR 15-29 ml/min      Orders:    Case Request; Standing    ceFAZolin (ANCEF) 2,000 mg in sodium chloride 0.9 % 100 mL IVPB    Case Request       Assessment & Plan      69-year-old woman referred by nephrology for an evaluation for advanced chronic kidney disease and for consideration for AV fistula placement.  Patient has not yet seen general surgery but does plan PD to be her primary method for dialysis.  She has never had any vascular access surgery before.  She has has a history of a gastric sleeve.  She is right-handed without pacemakers or defibrillators.  Vein mapping worksheet and images were reviewed.  It looks like she has adequate left arm cephalic vein for an upper arm AV fistula.  This vein is frequently cannulated for blood draws but looks compressible on images.  We recommended a left arm AV fistula under monitored anesthesia with a regional block.  We discussed the risk benefits, and alternatives,  associated with the procedure and all of her questions were asked and answered.  She agrees to proceed.       Follow Up     Return for After Procedure.  Patient was given instructions and counseling regarding her condition or for health maintenance advice. Please see specific information pulled into the AVS if appropriate.

## 2025-01-22 ENCOUNTER — PRE-ADMISSION TESTING (OUTPATIENT)
Dept: PREADMISSION TESTING | Facility: HOSPITAL | Age: 70
End: 2025-01-22
Payer: MEDICARE

## 2025-01-22 VITALS
DIASTOLIC BLOOD PRESSURE: 79 MMHG | OXYGEN SATURATION: 100 % | SYSTOLIC BLOOD PRESSURE: 145 MMHG | HEIGHT: 59 IN | BODY MASS INDEX: 40.08 KG/M2 | HEART RATE: 84 BPM | TEMPERATURE: 97.3 F | WEIGHT: 198.8 LBS | RESPIRATION RATE: 16 BRPM

## 2025-01-22 LAB
QT INTERVAL: 399 MS
QTC INTERVAL: 469 MS

## 2025-01-22 PROCEDURE — 93005 ELECTROCARDIOGRAM TRACING: CPT

## 2025-01-22 RX ORDER — MELATONIN 10 MG
10 CAPSULE ORAL AS NEEDED
COMMUNITY

## 2025-01-22 RX ORDER — FUROSEMIDE 40 MG/1
40 TABLET ORAL DAILY
COMMUNITY
Start: 2024-12-19

## 2025-01-22 RX ORDER — CALCIUM CARBONATE 500 MG/1
1 TABLET, CHEWABLE ORAL 3 TIMES DAILY
COMMUNITY

## 2025-01-22 RX ORDER — CHOLECALCIFEROL (VITAMIN D3) 25 MCG
1000 TABLET ORAL DAILY
COMMUNITY

## 2025-01-22 NOTE — DISCHARGE INSTRUCTIONS
Take the following medications the morning of surgery:      HYDROXYZINE, CARVEDILOL, PANTOPRAZOLE     If you are on prescription narcotic pain medication to control your pain you may also take that medication the morning of surgery.      General Instructions:     Do not eat solid food after midnight the night before surgery.  Clear liquids day of surgery are allowed but must be stopped at least two hours before your hospital arrival time.       Allowed clear liquids      Water, sodas, and tea or coffee with no cream or milk added.       12 to 20 ounces of a clear liquid that contains carbohydrates is recommended.  If non-diabetic, have Gatorade or Powerade.  If diabetic, have G2 or Powerade Zero.     Do not have liquids red in color.  Do not consume chicken, beef, pork or vegetable broth or bouillon cubes of any variety as they are not considered clear liquids and are not allowed.      Infants may have breast milk up to four hours before surgery.  Infants drinking formula may drink formula up to six hours before surgery.   Patients who avoid smoking, chewing tobacco and alcohol for 4 weeks prior to surgery have a reduced risk of post-operative complications.  Quit smoking as many days before surgery as you can.  Do not smoke, use chewing tobacco or drink alcohol the day of surgery.   If applicable bring your C-PAP/ BI-PAP machine in with you to preop day of surgery.  Bring any papers given to you in the doctor’s office.  Wear clean comfortable clothes.  Do not wear contact lenses, false eyelashes or make-up.  Bring a case for your glasses.   Bring crutches or walker if applicable.  Remove all piercings.  Leave jewelry and any other valuables at home.  Hair extensions with metal clips must be removed prior to surgery.  The Pre-Admission Testing nurse will instruct you to bring medications if unable to obtain an accurate list in Pre-Admission Testing.      Preventing a Surgical Site Infection:  For 2 to 3 days before  surgery, avoid shaving with a razor because the razor can irritate skin and make it easier to develop an infection.    Any areas of open skin can increase the risk of a post-operative wound infection by allowing bacteria to enter and travel throughout the body.  Notify your surgeon if you have any skin wounds / rashes even if it is not near the expected surgical site.  The area will need assessed to determine if surgery should be delayed until it is healed.  The night prior to surgery shower using a fresh bar of anti-bacterial soap (such as Dial) and clean washcloth.  Sleep in a clean bed with clean clothing.  Do not allow pets to sleep with you.  Shower on the morning of surgery using a fresh bar of anti-bacterial soap (such as Dial) and clean washcloth.  Dry with a clean towel and dress in clean clothing.  Ask your surgeon if you will be receiving antibiotics prior to surgery.  Make sure you, your family, and all healthcare providers clean their hands with soap and water or an alcohol based hand  before caring for you or your wound.    Day of surgery:  Your arrival time is approximately two hours before your scheduled surgery time.  Please note if you have an early arrival time the surgery doors do not open before 5:00 AM.  Upon arrival, a Pre-op nurse and Anesthesiologist will review your health history, obtain vital signs, and answer questions you may have.  The only belongings needed at this time will be a list of your home medications and if applicable your C-PAP/BI-PAP machine.  A Pre-op nurse will start an IV and you may receive medication in preparation for surgery, including something to help you relax.     Please be aware that surgery does come with discomfort.  We want to make every effort to control your discomfort so please discuss any uncontrolled symptoms with your nurse.   Your doctor will most likely have prescribed pain medications.      If you are going home after surgery you will  receive individualized written care instructions before being discharged.  A responsible adult must drive you to and from the hospital on the day of your surgery and ideally stay with you through the night.   .  Discharge prescriptions can be filled by the hospital pharmacy during regular pharmacy hours.  If you are having surgery late in the day/evening your prescription may be e-prescribed to your pharmacy.  Please verify your pharmacy hours or chose a 24 hour pharmacy to avoid not having access to your prescription because your pharmacy has closed for the day.    If you are staying overnight following surgery, you will be transported to your hospital room following the recovery period.  [facility] has all private rooms.    If you have any questions please call Pre-Admission Testing at [phone].  Deductibles and co-payments are collected on the day of service. Please be prepared to pay the required co-pay, deductible or deposit on the day of service as defined by your plan.    Call your surgeon immediately if you experience any of the following symptoms:  Sore Throat  Shortness of Breath or difficulty breathing  Cough  Chills  Body soreness or muscle pain  Headache  Fever  New loss of taste or smell  Do not arrive for your surgery ill.  Your procedure will need to be rescheduled to another time.  You will need to call your physician before the day of surgery to avoid any unnecessary exposure to hospital staff as well as other patients.        CHLORHEXIDINE CLOTH INSTRUCTIONS  The morning of surgery follow these instructions using the Chlorhexidine cloths you've been given.  These steps reduce bacteria on the body.  Do not use the cloths near your eyes, ears mouth, genitalia or on open wounds.  Throw the cloths away after use but do not try to flush them down a toilet.      Open and remove one cloth at a time from the package.    Leave the cloth unfolded and begin the bathing.  Massage the skin  with the cloths using gentle pressure to remove bacteria.  Do not scrub harshly.   Follow the steps below with one 2% CHG cloth per area (6 total cloths).  One cloth for neck, shoulders and chest.  One cloth for both arms, hands, fingers and underarms (do underarms last).  One cloth for the abdomen followed by groin.  One cloth for right leg and foot including between the toes.  One cloth for left leg and foot including between the toes.  The last cloth is to be used for the back of the neck, back and buttocks.    Allow the CHG to air dry 3 minutes on the skin which will give it time to work and decrease the chance of irritation.  The skin may feel sticky until it is dry.  Do not rinse with water or any other liquid or you will lose the beneficial effects of the CHG.  If mild skin irritation occurs, do rinse the skin to remove the CHG.  Report this to the nurse at time of admission.  Do not apply lotions, creams, ointments, deodorants or perfumes after using the clothes. Dress in clean clothes before coming to the hospital.

## 2025-01-24 ENCOUNTER — ANESTHESIA EVENT (OUTPATIENT)
Dept: PERIOP | Facility: HOSPITAL | Age: 70
End: 2025-01-24
Payer: MEDICARE

## 2025-01-24 ENCOUNTER — HOSPITAL ENCOUNTER (OUTPATIENT)
Facility: HOSPITAL | Age: 70
Setting detail: HOSPITAL OUTPATIENT SURGERY
Discharge: HOME | End: 2025-01-24
Attending: SURGERY | Admitting: SURGERY
Payer: MEDICARE

## 2025-01-24 ENCOUNTER — ANESTHESIA (OUTPATIENT)
Dept: PERIOP | Facility: HOSPITAL | Age: 70
End: 2025-01-24
Payer: MEDICARE

## 2025-01-24 VITALS
HEART RATE: 75 BPM | RESPIRATION RATE: 16 BRPM | BODY MASS INDEX: 38.83 KG/M2 | SYSTOLIC BLOOD PRESSURE: 174 MMHG | OXYGEN SATURATION: 100 % | TEMPERATURE: 97.5 F | DIASTOLIC BLOOD PRESSURE: 79 MMHG | HEIGHT: 60 IN

## 2025-01-24 DIAGNOSIS — N18.4 CKD (CHRONIC KIDNEY DISEASE) STAGE 4, GFR 15-29 ML/MIN: ICD-10-CM

## 2025-01-24 PROCEDURE — 36821 AV FUSION DIRECT ANY SITE: CPT | Performed by: SURGERY

## 2025-01-24 PROCEDURE — 25010000002 LIDOCAINE 2% SOLUTION: Performed by: NURSE ANESTHETIST, CERTIFIED REGISTERED

## 2025-01-24 PROCEDURE — 25010000002 HEPARIN (PORCINE) PER 1000 UNITS: Performed by: NURSE ANESTHETIST, CERTIFIED REGISTERED

## 2025-01-24 PROCEDURE — 25010000002 CEFAZOLIN PER 500 MG: Performed by: SURGERY

## 2025-01-24 PROCEDURE — 25010000002 PROTAMINE SULFATE PER 10 MG: Performed by: NURSE ANESTHETIST, CERTIFIED REGISTERED

## 2025-01-24 PROCEDURE — 25010000002 HEPARIN (PORCINE) PER 1000 UNITS: Performed by: SURGERY

## 2025-01-24 PROCEDURE — 25010000002 LABETALOL 5 MG/ML SOLUTION: Performed by: NURSE ANESTHETIST, CERTIFIED REGISTERED

## 2025-01-24 PROCEDURE — 25010000002 FENTANYL CITRATE (PF) 50 MCG/ML SOLUTION: Performed by: ANESTHESIOLOGY

## 2025-01-24 PROCEDURE — 25010000002 MEPIVACAINE HCL (PF) 1.5 % SOLUTION: Performed by: ANESTHESIOLOGY

## 2025-01-24 PROCEDURE — A4565 SLINGS: HCPCS | Performed by: SURGERY

## 2025-01-24 PROCEDURE — 25010000002 MIDAZOLAM PER 1 MG: Performed by: ANESTHESIOLOGY

## 2025-01-24 PROCEDURE — 25010000002 PROPOFOL 10 MG/ML EMULSION: Performed by: NURSE ANESTHETIST, CERTIFIED REGISTERED

## 2025-01-24 PROCEDURE — 25010000002 ROPIVACAINE PER 1 MG: Performed by: ANESTHESIOLOGY

## 2025-01-24 PROCEDURE — 25810000003 SODIUM CHLORIDE 0.9 % SOLUTION: Performed by: ANESTHESIOLOGY

## 2025-01-24 DEVICE — LIGACLIP MCA MULTIPLE CLIP APPLIERS, 20 SMALL CLIPS
Type: IMPLANTABLE DEVICE | Site: ARM | Status: FUNCTIONAL
Brand: LIGACLIP

## 2025-01-24 RX ORDER — MIDAZOLAM HYDROCHLORIDE 1 MG/ML
2 INJECTION, SOLUTION INTRAMUSCULAR; INTRAVENOUS
Status: COMPLETED | OUTPATIENT
Start: 2025-01-24 | End: 2025-01-24

## 2025-01-24 RX ORDER — SODIUM CHLORIDE 0.9 % (FLUSH) 0.9 %
3-10 SYRINGE (ML) INJECTION AS NEEDED
Status: DISCONTINUED | OUTPATIENT
Start: 2025-01-24 | End: 2025-01-24 | Stop reason: HOSPADM

## 2025-01-24 RX ORDER — SODIUM CHLORIDE 9 MG/ML
9 INJECTION, SOLUTION INTRAVENOUS CONTINUOUS
Status: DISCONTINUED | OUTPATIENT
Start: 2025-01-24 | End: 2025-01-24 | Stop reason: HOSPADM

## 2025-01-24 RX ORDER — SODIUM CHLORIDE, SODIUM LACTATE, POTASSIUM CHLORIDE, CALCIUM CHLORIDE 600; 310; 30; 20 MG/100ML; MG/100ML; MG/100ML; MG/100ML
9 INJECTION, SOLUTION INTRAVENOUS CONTINUOUS
Status: DISCONTINUED | OUTPATIENT
Start: 2025-01-24 | End: 2025-01-24

## 2025-01-24 RX ORDER — PROMETHAZINE HYDROCHLORIDE 25 MG/1
25 TABLET ORAL ONCE AS NEEDED
Status: DISCONTINUED | OUTPATIENT
Start: 2025-01-24 | End: 2025-01-24 | Stop reason: HOSPADM

## 2025-01-24 RX ORDER — LABETALOL HYDROCHLORIDE 5 MG/ML
5 INJECTION, SOLUTION INTRAVENOUS
Status: DISCONTINUED | OUTPATIENT
Start: 2025-01-24 | End: 2025-01-24 | Stop reason: HOSPADM

## 2025-01-24 RX ORDER — FENTANYL CITRATE 50 UG/ML
25 INJECTION, SOLUTION INTRAMUSCULAR; INTRAVENOUS
Status: DISCONTINUED | OUTPATIENT
Start: 2025-01-24 | End: 2025-01-24 | Stop reason: HOSPADM

## 2025-01-24 RX ORDER — PROMETHAZINE HYDROCHLORIDE 25 MG/1
25 SUPPOSITORY RECTAL ONCE AS NEEDED
Status: DISCONTINUED | OUTPATIENT
Start: 2025-01-24 | End: 2025-01-24 | Stop reason: HOSPADM

## 2025-01-24 RX ORDER — HEPARIN SODIUM 1000 [USP'U]/ML
INJECTION, SOLUTION INTRAVENOUS; SUBCUTANEOUS AS NEEDED
Status: DISCONTINUED | OUTPATIENT
Start: 2025-01-24 | End: 2025-01-24 | Stop reason: SURG

## 2025-01-24 RX ORDER — SODIUM CHLORIDE 0.9 % (FLUSH) 0.9 %
3 SYRINGE (ML) INJECTION EVERY 12 HOURS SCHEDULED
Status: DISCONTINUED | OUTPATIENT
Start: 2025-01-24 | End: 2025-01-24 | Stop reason: HOSPADM

## 2025-01-24 RX ORDER — LABETALOL HYDROCHLORIDE 5 MG/ML
INJECTION, SOLUTION INTRAVENOUS AS NEEDED
Status: DISCONTINUED | OUTPATIENT
Start: 2025-01-24 | End: 2025-01-24 | Stop reason: SURG

## 2025-01-24 RX ORDER — IPRATROPIUM BROMIDE AND ALBUTEROL SULFATE 2.5; .5 MG/3ML; MG/3ML
3 SOLUTION RESPIRATORY (INHALATION) ONCE AS NEEDED
Status: DISCONTINUED | OUTPATIENT
Start: 2025-01-24 | End: 2025-01-24 | Stop reason: HOSPADM

## 2025-01-24 RX ORDER — PROTAMINE SULFATE 10 MG/ML
INJECTION, SOLUTION INTRAVENOUS AS NEEDED
Status: DISCONTINUED | OUTPATIENT
Start: 2025-01-24 | End: 2025-01-24 | Stop reason: SURG

## 2025-01-24 RX ORDER — HYDRALAZINE HYDROCHLORIDE 20 MG/ML
5 INJECTION INTRAMUSCULAR; INTRAVENOUS
Status: DISCONTINUED | OUTPATIENT
Start: 2025-01-24 | End: 2025-01-24 | Stop reason: HOSPADM

## 2025-01-24 RX ORDER — LIDOCAINE HYDROCHLORIDE 20 MG/ML
INJECTION, SOLUTION INFILTRATION; PERINEURAL AS NEEDED
Status: DISCONTINUED | OUTPATIENT
Start: 2025-01-24 | End: 2025-01-24 | Stop reason: SURG

## 2025-01-24 RX ORDER — NALOXONE HCL 0.4 MG/ML
0.2 VIAL (ML) INJECTION AS NEEDED
Status: DISCONTINUED | OUTPATIENT
Start: 2025-01-24 | End: 2025-01-24 | Stop reason: HOSPADM

## 2025-01-24 RX ORDER — ROPIVACAINE HYDROCHLORIDE 5 MG/ML
INJECTION, SOLUTION EPIDURAL; INFILTRATION; PERINEURAL
Status: COMPLETED | OUTPATIENT
Start: 2025-01-24 | End: 2025-01-24

## 2025-01-24 RX ORDER — ONDANSETRON 2 MG/ML
4 INJECTION INTRAMUSCULAR; INTRAVENOUS ONCE AS NEEDED
Status: DISCONTINUED | OUTPATIENT
Start: 2025-01-24 | End: 2025-01-24 | Stop reason: HOSPADM

## 2025-01-24 RX ORDER — HYDROMORPHONE HYDROCHLORIDE 1 MG/ML
0.25 INJECTION, SOLUTION INTRAMUSCULAR; INTRAVENOUS; SUBCUTANEOUS
Status: DISCONTINUED | OUTPATIENT
Start: 2025-01-24 | End: 2025-01-24 | Stop reason: HOSPADM

## 2025-01-24 RX ORDER — FENTANYL CITRATE 50 UG/ML
100 INJECTION, SOLUTION INTRAMUSCULAR; INTRAVENOUS
Status: DISCONTINUED | OUTPATIENT
Start: 2025-01-24 | End: 2025-01-24 | Stop reason: HOSPADM

## 2025-01-24 RX ORDER — FLUMAZENIL 0.1 MG/ML
0.2 INJECTION INTRAVENOUS AS NEEDED
Status: DISCONTINUED | OUTPATIENT
Start: 2025-01-24 | End: 2025-01-24 | Stop reason: HOSPADM

## 2025-01-24 RX ORDER — EPHEDRINE SULFATE 50 MG/ML
5 INJECTION, SOLUTION INTRAVENOUS ONCE AS NEEDED
Status: DISCONTINUED | OUTPATIENT
Start: 2025-01-24 | End: 2025-01-24 | Stop reason: HOSPADM

## 2025-01-24 RX ORDER — PROPOFOL 10 MG/ML
VIAL (ML) INTRAVENOUS AS NEEDED
Status: DISCONTINUED | OUTPATIENT
Start: 2025-01-24 | End: 2025-01-24 | Stop reason: SURG

## 2025-01-24 RX ORDER — HYDROCODONE BITARTRATE AND ACETAMINOPHEN 5; 325 MG/1; MG/1
1 TABLET ORAL ONCE AS NEEDED
Status: DISCONTINUED | OUTPATIENT
Start: 2025-01-24 | End: 2025-01-24 | Stop reason: HOSPADM

## 2025-01-24 RX ORDER — FENTANYL CITRATE 50 UG/ML
50 INJECTION, SOLUTION INTRAMUSCULAR; INTRAVENOUS
Status: DISCONTINUED | OUTPATIENT
Start: 2025-01-24 | End: 2025-01-24 | Stop reason: HOSPADM

## 2025-01-24 RX ORDER — DIPHENHYDRAMINE HYDROCHLORIDE 50 MG/ML
12.5 INJECTION INTRAMUSCULAR; INTRAVENOUS
Status: DISCONTINUED | OUTPATIENT
Start: 2025-01-24 | End: 2025-01-24 | Stop reason: HOSPADM

## 2025-01-24 RX ORDER — ATROPINE SULFATE 0.4 MG/ML
0.4 INJECTION, SOLUTION INTRAMUSCULAR; INTRAVENOUS; SUBCUTANEOUS ONCE AS NEEDED
Status: DISCONTINUED | OUTPATIENT
Start: 2025-01-24 | End: 2025-01-24 | Stop reason: HOSPADM

## 2025-01-24 RX ORDER — HYDROCODONE BITARTRATE AND ACETAMINOPHEN 7.5; 325 MG/1; MG/1
1 TABLET ORAL EVERY 4 HOURS PRN
Status: DISCONTINUED | OUTPATIENT
Start: 2025-01-24 | End: 2025-01-24 | Stop reason: HOSPADM

## 2025-01-24 RX ADMIN — FENTANYL CITRATE 50 MCG: 50 INJECTION, SOLUTION INTRAMUSCULAR; INTRAVENOUS at 09:53

## 2025-01-24 RX ADMIN — PROPOFOL 20 MG: 10 INJECTION, EMULSION INTRAVENOUS at 10:55

## 2025-01-24 RX ADMIN — PROPOFOL 20 MG: 10 INJECTION, EMULSION INTRAVENOUS at 11:35

## 2025-01-24 RX ADMIN — MIDAZOLAM 1 MG: 1 INJECTION INTRAMUSCULAR; INTRAVENOUS at 09:56

## 2025-01-24 RX ADMIN — PROTAMINE SULFATE 30 MG: 10 INJECTION, SOLUTION INTRAVENOUS at 11:55

## 2025-01-24 RX ADMIN — LABETALOL HYDROCHLORIDE 10 MG: 5 INJECTION, SOLUTION INTRAVENOUS at 12:06

## 2025-01-24 RX ADMIN — LABETALOL HYDROCHLORIDE 10 MG: 5 INJECTION, SOLUTION INTRAVENOUS at 11:45

## 2025-01-24 RX ADMIN — PROPOFOL 80 MCG/KG/MIN: 10 INJECTION, EMULSION INTRAVENOUS at 10:57

## 2025-01-24 RX ADMIN — MEPIVACAINE HYDROCHLORIDE 10 ML: 15 INJECTION, SOLUTION EPIDURAL; INFILTRATION at 10:05

## 2025-01-24 RX ADMIN — ROPIVACAINE HYDROCHLORIDE 20 ML: 5 INJECTION EPIDURAL; INFILTRATION; PERINEURAL at 10:05

## 2025-01-24 RX ADMIN — CEFAZOLIN 2000 MG: 2 INJECTION, POWDER, FOR SOLUTION INTRAMUSCULAR; INTRAVENOUS at 10:41

## 2025-01-24 RX ADMIN — LABETALOL HYDROCHLORIDE 10 MG: 5 INJECTION, SOLUTION INTRAVENOUS at 11:16

## 2025-01-24 RX ADMIN — LABETALOL HYDROCHLORIDE 10 MG: 5 INJECTION, SOLUTION INTRAVENOUS at 12:11

## 2025-01-24 RX ADMIN — PROPOFOL 20 MG: 10 INJECTION, EMULSION INTRAVENOUS at 11:51

## 2025-01-24 RX ADMIN — LIDOCAINE HYDROCHLORIDE 60 MG: 20 INJECTION, SOLUTION INFILTRATION; PERINEURAL at 10:55

## 2025-01-24 RX ADMIN — LABETALOL HYDROCHLORIDE 5 MG: 5 INJECTION, SOLUTION INTRAVENOUS at 13:44

## 2025-01-24 RX ADMIN — HEPARIN SODIUM 3000 UNITS: 1000 INJECTION INTRAVENOUS; SUBCUTANEOUS at 11:30

## 2025-01-24 RX ADMIN — SODIUM CHLORIDE 9 ML/HR: 9 INJECTION, SOLUTION INTRAVENOUS at 09:55

## 2025-01-24 RX ADMIN — MIDAZOLAM 1 MG: 1 INJECTION INTRAMUSCULAR; INTRAVENOUS at 09:53

## 2025-01-24 NOTE — ANESTHESIA PREPROCEDURE EVALUATION
Anesthesia Evaluation     no history of anesthetic complications:   NPO Solid Status: > 8 hours             Airway   Mallampati: II  TM distance: >3 FB  Neck ROM: full  No difficulty expected  Dental - normal exam     Pulmonary - normal exam   (+) ,sleep apnea on CPAP  (-) not a smoker  Cardiovascular   Exercise tolerance: poor (<4 METS)    ECG reviewed  Rhythm: regular    (+) hypertension, hyperlipidemia      Neuro/Psych  (+) psychiatric history  GI/Hepatic/Renal/Endo    (+) morbid obesity, hiatal hernia, GERD, renal disease- CRI, thyroid problem     ROS Comment: Stage 4 CRI due to NSAIDS    Musculoskeletal     (+) arthralgias, chronic pain  Abdominal    Substance History      OB/GYN          Other                      Anesthesia Plan    ASA 4     regional     (  )    Anesthetic plan, risks, benefits, and alternatives have been provided, discussed and informed consent has been obtained with: patient.

## 2025-01-24 NOTE — PERIOPERATIVE NURSING NOTE
"Patient coughing when she attempts to take clears. States \"it went down the wrong way\". Voice is still very quiet and hoarse \"since the block\". She has tried small sips of clears twice which has caused coughing both times. Discussed with Dr Simeon. Will monitor patient for a bit here in Phase II.   "

## 2025-01-24 NOTE — PERIOPERATIVE NURSING NOTE
Spoke with Dr Arzate regarding patient's BP---ok to send patient home and she will take her meds when she gets there (pt states lasix makes a big difference in her BP)

## 2025-01-24 NOTE — ANESTHESIA POSTPROCEDURE EVALUATION
Patient: Ro Connor    Procedure Summary       Date: 01/24/25 Room / Location: [facility] OR 08 / [facility] MAIN OR    Anesthesia Start: 1048 Anesthesia Stop: 1221    Procedure: LEFT ARTERIOVENOUS FISTULA FORMATION (Left) Diagnosis:       CKD (chronic kidney disease) stage 4, GFR 15-29 ml/min      (CKD (chronic kidney disease) stage 4, GFR 15-29 ml/min [N18.4])    Surgeons: Aiden Richards MD Provider: Dane Simeon MD    Anesthesia Type: regional ASA Status: 4            Anesthesia Type: regional    Vitals  Vitals Value Taken Time   /83 01/24/25 1345   Temp 36.4 °C (97.5 °F) 01/24/25 1217   Pulse 69 01/24/25 1350   Resp 16 01/24/25 1217   SpO2 94 % 01/24/25 1350   Vitals shown include unfiled device data.        Post Anesthesia Care and Evaluation    Patient location during evaluation: bedside  Patient participation: complete - patient participated  Level of consciousness: awake  Pain management: adequate    Airway patency: patent  Anesthetic complications: No anesthetic complications  PONV Status: controlled  Cardiovascular status: acceptable  Respiratory status: acceptable  Hydration status: acceptable    Comments: --------------------            01/24/25               1344     --------------------   BP:     (!) 190/83   Pulse:      71       Resp:                Temp:                SpO2:               --------------------

## 2025-01-24 NOTE — ANESTHESIA PROCEDURE NOTES
Peripheral Block    Pre-sedation assessment completed: 1/24/2025 9:52 AM    Patient reassessed immediately prior to procedure    Patient location during procedure: pre-op  Start time: 1/24/2025 9:52 AM  Stop time: 1/24/2025 10:05 AM  Reason for block: at surgeon's request and post-op pain management  Performed by  Anesthesiologist: Dane Simeno MD  Preanesthetic Checklist  Completed: patient identified, IV checked, site marked, risks and benefits discussed, surgical consent, monitors and equipment checked, pre-op evaluation and timeout performed  Prep:  Pt Position: supine  Sterile barriers:cap, gloves, mask and sterile barriers  Prep: ChloraPrep  Patient monitoring: blood pressure monitoring, continuous pulse oximetry and EKG  Procedure    Sedation: yes  Performed under: local infiltration  Guidance:ultrasound guided and U/S used to identify structures for appropriate needle placement and to see local anesthetic disbursement    ULTRASOUND INTERPRETATION.  Using ultrasound guidance a 22 G gauge needle was placed in close proximity to the brachial plexus nerve, at which point, under ultrasound guidance anesthetic was injected in the area of the nerve and spread of the anesthesia was seen on ultrasound in close proximity thereto.  There were no abnormalities seen on ultrasound; a digital image was taken; and the patient tolerated the procedure with no complications. Images:still images obtained, printed/placed on chart (U/S used to localize the nerve)    Laterality:left  Block Type:interscalene  Injection Technique:single-shot  Needle Type:echogenic  Needle Gauge:22 G  Resistance on Injection: less than 15 psi    Medications Used: Mepivacaine HCl (PF) (CARBOCAINE) 1.5 % injection - Injection   10 mL - 1/24/2025 10:05:00 AM  ropivacaine (NAROPIN) 0.5 % injection - Injection   20 mL - 1/24/2025 10:05:00 AM      Post Assessment  Injection Assessment: negative aspiration for heme, no paresthesia on injection and  incremental injection  Patient Tolerance:comfortable throughout block  Complications:no  Performed by: Dane Simeon MD

## 2025-01-24 NOTE — DISCHARGE INSTRUCTIONS
[facility]  Larry Torres, Tesfaye, Angie Roque,Maurice, Sravanthi  [address] [facility] [place], [address]  [phone]    Post-Operative Instructions for AV Fistula / Graft   Diet: Regular Diet    Medications: Take your regularly scheduled medications on the day of your surgery, unless your doctor has directed you otherwise. You may be sent home with a prescription for pain medication, follow the directions as prescribed.    Activity Restrictions / Driving: Avoid lifting more than 15 pounds or other activities that stress or compress the access area. No driving for the remainder of the day after surgery. You may drive when you no longer are taking narcotic pain medications. If a nerve block was done to numb your arm for surgery, you will be placed in an arm sling.  This numbness and inability to move the arm can last for as little as 6 hours but as many as 18.  The sling should be used during this time but can be removed when sensation and movement of your arm is normal and does not need to be used after that. Use of the arm is encouraged after the surgery.    Incision Care: Some bruising is normal. If you have drainage from the incision please notify the office. Dressing should be removed in 48 hours. After dressing is removed, it is OK to shower. Do not submerge incision until cleared by your surgeon (bath or swimming).    Bathing and Showering: You may shower after you remove your dressing.    Follow-up Appointments: You will need to return to the office for a follow-up visit within 1-3 weeks after your surgery. Please make sure you have your appointment scheduled, call [phone].    The patient (you) should:  1. Avoid wearing tight constrictive clothing over that arm.  2. Avoid wearing jewelry that is tight, such as a watch on the access arm.  3. Avoid carrying heavy objects.  4. Avoid purse straps over the fistula.  5. Avoid sleeping on the arm or keeping it bent for extended periods of time.  6. Each  "day, using your opposite hand, feel over the fistula for the \"thrill\" or vibration that is normally present.    Fistula Information / Care:   It is normal to have swelling in the surgical area. To help control this swelling, you should elevate your arm on a pillow.   Wiggle your fingers and clinch your fist 10 times every hour, while awake, for the first 5-7 days. Also, bend and straighten at the elbow to regain normal range of motion. These exercises are designed to promote circulation in the fingers and aid in draining away the excess fluid accumulation in the immediate area.  No blood pressures or needle sticks in the arm with your access.    Call the office for the followin. Fever greater than 101.0  2. Uncontrolled pain. This is on a scale of 1-10 (10 being the worst pain imaginable) your pain is a level 7 or above.  3. It is important that you notify our office if you are having numbness and significant pain in the extremity in which you have just had surgery!  4. Decreased or absent thrill.  5. Nausea, diarrhea, and/or vomiting that continue for 12-24 hours.  6. Signs of an infection: redness, increased swelling, drainage, fever and/or chills.  7. Chest pain or difficulty breathing.    The fistula or graft CAN NOT be used until the MD has given written approval. Generally, a graft will be ready to use in 2-4  weeks, and a fistula will be ready to use in 6-12 weeks on average.     If you have further questions after reading this handout, the office is open from 8:30am to 5:00pm Monday through Friday. Call [phone].   "

## 2025-01-24 NOTE — OP NOTE
Operative Note  Location: [facility]    Pre-op Diagnosis: Chronic Kidney Disease Stage IV    Post-op Diagnosis: Same    Procedure(s):  LEFT ARTERIOVENOUS FISTULA FORMATION    Surgeon(s):  Aiden Richards MD    Assistant: Chantal Mendiola  RN, Provided critical assistance in exposure, retraction, and suction that overall decrease blood loss and operative time.    Anesthesia: Monitored Anesthesia Care with Regional    Estimated Blood Loss: minimal     Staff:   Circulator: Diana Watts RN; Julisa Arce RN; Katerina Wang RN  Scrub Person: Zahra Bermudez; Malika Henry  Assistant: Chantal Mendiola CSFA    Complications: None    Specimen: None    Findings:   The vein was a bit smaller than I expected but I was able to use a bifurcation so the pugh of the anastomosis was plainly large.  I can tell on the preoperative ultrasound that the brachial artery was small but adequate.  Probably in the 2 to 3 mm range.  Patient had a nice thrill and a palpable radial pulse at the completion confirmed with Doppler.    Indications:  The patient is an 69 y.o. female referred for evaluation for AV fistula placement.  The patient has Chronic Kidney Disease Stage IV .  After evaluation in the office and ultrasound vein mapping of the upper extremities the patient was determined to be a candidate for brachiocephalic arteriovenous fistula.  The risks, benefits, and alternatives were discussed with the patient who agreed to proceed.  This includes but is not limited to nerve injury, vascular compromise, infection, and failure to mature.    Procedure:  The patient was taken to Operating Room and identified as Ro Connor and the procedure verified as left brachial cephalic AVF. A Time Out was held and the above information confirmed.    In the operating room with the patient sedated the arm was mapped under ultrasound again to help determine the best location for incision.  A decision was made to make the incision at  the antecubital fossa.  Skin and subcutaneous tissues were anesthetized.  The skin and subcutaneous was divided sharply.  The cephalic vein was identified and dissected out circumferentially for a significant segment.  It was marked.  The bicipital aponeurosis was exposed.  Local anesthesia was administered below this and then the aponeurosis was divided sharply.  The brachial artery was exposed circumferentially.  Enough of the cephalic vein was exposed so that it could be ligated distally with a silk ligature and then transposed over to the brachial artery which was small.  The vein is spatulated in order to fit the arterial anastomosis.  It was marked to reduce the risk of twisting.  The brachial artery was opened up with 11 blade scalpel and small Boyce scissors.  A running anastomosis is done with a 6-0 Prolene suture.  It was flushed and de-aired prior to completion.  There was a good thrill to completion of the case.  There was a good Doppler signal at the wrist as well.  The wound was copious irrigated with an antibiotic irrigation and closed in 2 layers with Vicryl sutures.  Dermabond glue was used for the skin.  Patient tolerated it well and no intraoperative complications were immediately apparent.        Active Hospital Problems    Diagnosis  POA    **CKD (chronic kidney disease) stage 4, GFR 15-29 ml/min [N18.4]  Unknown      Resolved Hospital Problems   No resolved problems to display.      Aiden Richards MD     Date: 1/24/2025  Time: 12:07 EST

## 2025-01-29 ENCOUNTER — OFFICE VISIT (OUTPATIENT)
Dept: INTERNAL MEDICINE | Facility: CLINIC | Age: 70
End: 2025-01-29
Payer: MEDICARE

## 2025-01-29 VITALS
HEIGHT: 60 IN | WEIGHT: 220.4 LBS | OXYGEN SATURATION: 99 % | BODY MASS INDEX: 43.27 KG/M2 | DIASTOLIC BLOOD PRESSURE: 68 MMHG | TEMPERATURE: 97.6 F | SYSTOLIC BLOOD PRESSURE: 132 MMHG | HEART RATE: 61 BPM

## 2025-01-29 DIAGNOSIS — G47.00 INSOMNIA, UNSPECIFIED TYPE: ICD-10-CM

## 2025-01-29 DIAGNOSIS — Z91.09 ENVIRONMENTAL ALLERGIES: ICD-10-CM

## 2025-01-29 DIAGNOSIS — E61.1 IRON DEFICIENCY: ICD-10-CM

## 2025-01-29 DIAGNOSIS — K21.9 GASTROESOPHAGEAL REFLUX DISEASE WITHOUT ESOPHAGITIS: ICD-10-CM

## 2025-01-29 DIAGNOSIS — I10 ESSENTIAL HYPERTENSION: Primary | ICD-10-CM

## 2025-01-29 DIAGNOSIS — E66.01 OBESITY, CLASS III, BMI 40-49.9 (MORBID OBESITY): ICD-10-CM

## 2025-01-29 DIAGNOSIS — M25.50 MULTIPLE JOINT PAIN: ICD-10-CM

## 2025-01-29 DIAGNOSIS — E78.5 HYPERLIPIDEMIA, UNSPECIFIED HYPERLIPIDEMIA TYPE: ICD-10-CM

## 2025-01-29 DIAGNOSIS — N18.4 STAGE 4 CHRONIC KIDNEY DISEASE: ICD-10-CM

## 2025-01-29 PROCEDURE — 3078F DIAST BP <80 MM HG: CPT | Performed by: NURSE PRACTITIONER

## 2025-01-29 PROCEDURE — G2211 COMPLEX E/M VISIT ADD ON: HCPCS | Performed by: NURSE PRACTITIONER

## 2025-01-29 PROCEDURE — 99215 OFFICE O/P EST HI 40 MIN: CPT | Performed by: NURSE PRACTITIONER

## 2025-01-29 PROCEDURE — 1125F AMNT PAIN NOTED PAIN PRSNT: CPT | Performed by: NURSE PRACTITIONER

## 2025-01-29 PROCEDURE — 3075F SYST BP GE 130 - 139MM HG: CPT | Performed by: NURSE PRACTITIONER

## 2025-01-29 RX ORDER — CARVEDILOL 12.5 MG/1
12.5 TABLET ORAL 2 TIMES DAILY WITH MEALS
Qty: 180 TABLET | Refills: 0 | Status: SHIPPED | OUTPATIENT
Start: 2025-01-29

## 2025-01-29 RX ORDER — CETIRIZINE HYDROCHLORIDE 10 MG/1
10 TABLET ORAL DAILY
Qty: 90 TABLET | Refills: 1 | Status: SHIPPED | OUTPATIENT
Start: 2025-01-29

## 2025-01-29 RX ORDER — TRAZODONE HYDROCHLORIDE 100 MG/1
100 TABLET ORAL NIGHTLY PRN
Qty: 90 TABLET | Refills: 1 | Status: SHIPPED | OUTPATIENT
Start: 2025-01-29

## 2025-01-29 RX ORDER — PANTOPRAZOLE SODIUM 40 MG/1
40 TABLET, DELAYED RELEASE ORAL DAILY
Qty: 90 TABLET | Refills: 1 | Status: SHIPPED | OUTPATIENT
Start: 2025-01-29

## 2025-01-29 RX ORDER — SIMVASTATIN 40 MG
40 TABLET ORAL NIGHTLY
Qty: 90 TABLET | Refills: 1 | Status: SHIPPED | OUTPATIENT
Start: 2025-01-29

## 2025-01-29 RX ORDER — FERROUS SULFATE 325(65) MG
1 TABLET ORAL EVERY OTHER DAY
Qty: 45 TABLET | Refills: 1 | Status: SHIPPED | OUTPATIENT
Start: 2025-01-29

## 2025-01-29 NOTE — PROGRESS NOTES
"Chief Complaint  Follow-up (3 month follow up on Hypertension )    Subjective        Ro Connor presents to [facility] for follow-up regarding blood pressure.  Patient not previously known by me, normally sees one of my colleagues who is out on maternity leave.  Patient was seen last in November PCP, patient was started on amlodipine to help target blood pressure.  Patient does report after starting that, she had significant swelling.  She was seen by her nephrologist who discontinued the amlodipine, he did increase her Coreg and Lasix.  Her blood pressure has been better managed since then.  She does have end-stage renal disease, she had a fistula placed in her left arm by vascular surgery, which is healing well.  She is hoping for peritoneal dialysis, especially at the beginning, but if needed the transition for hemodialysis is in progress.  Patient is concerned regarding her weight, she wanted to discuss any weight loss medications that would be available for her.  She is also following up on chronic conditions and need refills of her medication.    Objective   Vital Signs:   /68   Pulse 61   Temp 97.6 °F (36.4 °C)   Ht 152.4 cm (60\")   Wt 100 kg (220 lb 6.4 oz)   SpO2 99%   BMI 43.04 kg/m²     Physical Exam  Vitals and nursing note reviewed.   Constitutional:       Appearance: Normal appearance. She is obese.   HENT:      Head: Normocephalic and atraumatic.   Cardiovascular:      Rate and Rhythm: Normal rate.   Pulmonary:      Effort: Pulmonary effort is normal.   Neurological:      Mental Status: She is alert.   Psychiatric:         Mood and Affect: Mood normal.        Result Review :  {The following data was reviewed by MAYUR Awan on 01/29/25                Diagnoses and all orders for this visit:    1. Essential hypertension (Primary)  -     carvedilol (COREG) 12.5 MG tablet; Take 1 tablet by mouth 2 (Two) Times a Day With Meals.  Dispense: 180 tablet; " Refill: 0    2. Hyperlipidemia, unspecified hyperlipidemia type  -     simvastatin (ZOCOR) 40 MG tablet; Take 1 tablet by mouth Every Night.  Dispense: 90 tablet; Refill: 1    3. Multiple joint pain    4. Gastroesophageal reflux disease without esophagitis  -     pantoprazole (Protonix) 40 MG EC tablet; Take 1 tablet by mouth Daily.  Dispense: 90 tablet; Refill: 1    5. Stage 4 chronic kidney disease  -     carvedilol (COREG) 12.5 MG tablet; Take 1 tablet by mouth 2 (Two) Times a Day With Meals.  Dispense: 180 tablet; Refill: 0    6. Iron deficiency  -     ferrous sulfate (SV Iron) 325 (65 FE) MG tablet; Take 1 tablet by mouth Every Other Day.  Dispense: 45 tablet; Refill: 1    7. Insomnia, unspecified type  -     traZODone (DESYREL) 100 MG tablet; Take 1 tablet by mouth At Night As Needed for Sleep.  Dispense: 90 tablet; Refill: 1    8. Environmental allergies  -     cetirizine (zyrTEC) 10 MG tablet; Take 1 tablet by mouth Daily.  Dispense: 90 tablet; Refill: 1    9. Obesity, Class III, BMI 40-49.9 (morbid obesity)    Reviewed visit notes from her PCP, nephrology.  I was able to better understand all of her conditions, going back and looking at previous labs, imaging.  We did review her medications, refilled as appropriate.  Her nephrologist manages Lasix only, so I did not bother with that 1.  We discussed risk, benefits, side effects of those medications.  We discussed weight loss and weight loss medications due to her weight currently.  Unfortunately, there would not be any medications currently that would be approved by insurance like the GLP-1 class, she is also not diabetic, so she would not qualify for Ozempic or Mounjaro either.  She would not be a good candidate for any other weight loss medication like phentermine or Contrave.  Would focus on diet and lifestyle modification.  Can follow-up otherwise as needed, or as scheduled with her PCP.    I spent greater than 40 minutes caring for Ro on this date  of service. This time includes time spent by me in the following activities:preparing for the visit, reviewing tests, obtaining and/or reviewing a separately obtained history, performing a medically appropriate examination and/or evaluation , counseling and educating the patient/family/caregiver, ordering medications, tests, or procedures, and documenting information in the medical record  Follow Up   Return in about 4 months (around 5/29/2025).  Patient was given instructions and counseling regarding her condition or for health maintenance advice. Please see specific information pulled into the AVS if appropriate.     Nick Bender, APRN  1/29/2025  This note was electronically signed.

## 2025-02-03 ENCOUNTER — OFFICE VISIT (OUTPATIENT)
Age: 70
End: 2025-02-03
Payer: MEDICARE

## 2025-02-03 VITALS
TEMPERATURE: 97 F | BODY MASS INDEX: 39.19 KG/M2 | DIASTOLIC BLOOD PRESSURE: 83 MMHG | OXYGEN SATURATION: 97 % | RESPIRATION RATE: 16 BRPM | HEIGHT: 60 IN | SYSTOLIC BLOOD PRESSURE: 132 MMHG | WEIGHT: 199.6 LBS | HEART RATE: 76 BPM

## 2025-02-03 DIAGNOSIS — N18.4 CKD (CHRONIC KIDNEY DISEASE) STAGE 4, GFR 15-29 ML/MIN: Primary | ICD-10-CM

## 2025-02-03 PROCEDURE — 3075F SYST BP GE 130 - 139MM HG: CPT | Performed by: NURSE PRACTITIONER

## 2025-02-03 PROCEDURE — 3079F DIAST BP 80-89 MM HG: CPT | Performed by: NURSE PRACTITIONER

## 2025-02-03 PROCEDURE — 1160F RVW MEDS BY RX/DR IN RCRD: CPT | Performed by: NURSE PRACTITIONER

## 2025-02-03 PROCEDURE — 1159F MED LIST DOCD IN RCRD: CPT | Performed by: NURSE PRACTITIONER

## 2025-02-03 PROCEDURE — 99024 POSTOP FOLLOW-UP VISIT: CPT | Performed by: NURSE PRACTITIONER

## 2025-02-03 NOTE — PROGRESS NOTES
Chief Complaint  CKD (chronic kidney disease) stage 4 (Post op)    Subjective        Ro Connor presents to [facility]  HPI   Ro Connor is a 69 y.o. female that has been followed in our office for hemodialysis access.  She had a left brachiocephalic AV fistula placed on 1/24/2025.  She returns today for a postoperative appointment.  She has a history of chronic kidney disease and there are no current plans for hemodialysis.    Review of Systems   Constitutional:  Negative for fever.   Eyes:  Negative for visual disturbance.   Cardiovascular:  Negative for leg swelling.   Gastrointestinal:  Negative for abdominal pain.   Musculoskeletal:  Negative for back pain.   Skin:  Negative for color change, pallor and wound.   Neurological:  Negative for dizziness, facial asymmetry, speech difficulty and weakness.        Ro Connor  reports that she has never smoked. She has never used smokeless tobacco..        Objective   Vital Signs:  Vitals:    02/03/25 1212   BP: 132/83   Pulse: 76   Resp: 16   Temp: 97 °F (36.1 °C)   SpO2: 97%      Body mass index is 38.98 kg/m².           Physical Exam  Vitals reviewed.   Constitutional:       Appearance: Normal appearance.   HENT:      Head: Normocephalic.   Cardiovascular:      Rate and Rhythm: Normal rate and regular rhythm.      Pulses: Normal pulses.           Dorsalis pedis pulses are 3+ on the right side and 3+ on the left side.        Posterior tibial pulses are 3+ on the right side and 3+ on the left side.   Pulmonary:      Effort: Pulmonary effort is normal.   Skin:     General: Skin is warm.   Neurological:      General: No focal deficit present.      Mental Status: She is alert and oriented to person, place, and time.   Psychiatric:         Mood and Affect: Mood normal.          Result Review :                 Assessment and Plan     Diagnoses and all orders for this visit:    1. CKD (chronic kidney disease)  stage 4, GFR 15-29 ml/min (Primary)  -     Duplex Hemodialysis Access CAR; Future          Patient presents today for postoperative appointment after a AV fistula was placed last month. She is doing well and has a good thrill and bruit auscultated. She will return in 3 months with an AV fistula to assess for maturation.        Follow Up     Return in about 3 months (around 5/3/2025) for AVF/AVG; see BGT.  Patient was given instructions and counseling regarding her condition or for health maintenance advice. Please see specific information pulled into the AVS if appropriate.     Tricia Quinones, APRN

## 2025-03-12 ENCOUNTER — LAB (OUTPATIENT)
Facility: HOSPITAL | Age: 70
End: 2025-03-12
Payer: MEDICARE

## 2025-03-12 ENCOUNTER — TRANSCRIBE ORDERS (OUTPATIENT)
Dept: ADMINISTRATIVE | Facility: HOSPITAL | Age: 70
End: 2025-03-12
Payer: MEDICARE

## 2025-03-12 DIAGNOSIS — D64.9 ANEMIA, UNSPECIFIED TYPE: ICD-10-CM

## 2025-03-12 DIAGNOSIS — N18.4 CHRONIC KIDNEY DISEASE, STAGE IV (SEVERE): Primary | ICD-10-CM

## 2025-03-12 DIAGNOSIS — N18.4 CHRONIC KIDNEY DISEASE, STAGE IV (SEVERE): ICD-10-CM

## 2025-03-12 LAB
ANION GAP SERPL CALCULATED.3IONS-SCNC: 13.5 MMOL/L (ref 5–15)
BACTERIA UR QL AUTO: ABNORMAL /HPF
BASOPHILS # BLD AUTO: 0.08 10*3/MM3 (ref 0–0.2)
BASOPHILS NFR BLD AUTO: 0.8 % (ref 0–1.5)
BILIRUB UR QL STRIP: NEGATIVE
BUN SERPL-MCNC: 48 MG/DL (ref 8–23)
BUN/CREAT SERPL: 14.6 (ref 7–25)
CALCIUM SPEC-SCNC: 9.2 MG/DL (ref 8.6–10.5)
CHLORIDE SERPL-SCNC: 103 MMOL/L (ref 98–107)
CLARITY UR: CLEAR
CO2 SERPL-SCNC: 21.5 MMOL/L (ref 22–29)
COLOR UR: YELLOW
CREAT SERPL-MCNC: 3.28 MG/DL (ref 0.57–1)
CREAT UR-MCNC: 21.3 MG/DL
DEPRECATED RDW RBC AUTO: 43.6 FL (ref 37–54)
EGFRCR SERPLBLD CKD-EPI 2021: 14.7 ML/MIN/1.73
EOSINOPHIL # BLD AUTO: 0.47 10*3/MM3 (ref 0–0.4)
EOSINOPHIL NFR BLD AUTO: 4.9 % (ref 0.3–6.2)
ERYTHROCYTE [DISTWIDTH] IN BLOOD BY AUTOMATED COUNT: 12.7 % (ref 12.3–15.4)
FERRITIN SERPL-MCNC: 678 NG/ML (ref 13–150)
GLUCOSE SERPL-MCNC: 88 MG/DL (ref 65–99)
GLUCOSE UR STRIP-MCNC: NEGATIVE MG/DL
HCT VFR BLD AUTO: 31.5 % (ref 34–46.6)
HGB BLD-MCNC: 10 G/DL (ref 12–15.9)
HGB UR QL STRIP.AUTO: NEGATIVE
HYALINE CASTS UR QL AUTO: ABNORMAL /LPF
IMM GRANULOCYTES # BLD AUTO: 0.02 10*3/MM3 (ref 0–0.05)
IMM GRANULOCYTES NFR BLD AUTO: 0.2 % (ref 0–0.5)
IRON 24H UR-MRATE: 70 MCG/DL (ref 37–145)
IRON SATN MFR SERPL: 26 % (ref 20–50)
KETONES UR QL STRIP: NEGATIVE
LEUKOCYTE ESTERASE UR QL STRIP.AUTO: ABNORMAL
LYMPHOCYTES # BLD AUTO: 3.33 10*3/MM3 (ref 0.7–3.1)
LYMPHOCYTES NFR BLD AUTO: 34.8 % (ref 19.6–45.3)
MCH RBC QN AUTO: 30 PG (ref 26.6–33)
MCHC RBC AUTO-ENTMCNC: 31.7 G/DL (ref 31.5–35.7)
MCV RBC AUTO: 94.6 FL (ref 79–97)
MONOCYTES # BLD AUTO: 0.81 10*3/MM3 (ref 0.1–0.9)
MONOCYTES NFR BLD AUTO: 8.5 % (ref 5–12)
NEUTROPHILS NFR BLD AUTO: 4.85 10*3/MM3 (ref 1.7–7)
NEUTROPHILS NFR BLD AUTO: 50.8 % (ref 42.7–76)
NITRITE UR QL STRIP: NEGATIVE
NRBC BLD AUTO-RTO: 0 /100 WBC (ref 0–0.2)
PH UR STRIP.AUTO: 6 [PH] (ref 5–8)
PHOSPHATE SERPL-MCNC: 4.8 MG/DL (ref 2.5–4.5)
PLATELET # BLD AUTO: 210 10*3/MM3 (ref 140–450)
PMV BLD AUTO: 11.8 FL (ref 6–12)
POTASSIUM SERPL-SCNC: 5.1 MMOL/L (ref 3.5–5.2)
PROT ?TM UR-MCNC: <4 MG/DL
PROT UR QL STRIP: NEGATIVE
PROT/CREAT UR: NORMAL MG/G{CREAT}
PTH-INTACT SERPL-MCNC: 115 PG/ML (ref 15–65)
RBC # BLD AUTO: 3.33 10*6/MM3 (ref 3.77–5.28)
RBC # UR STRIP: ABNORMAL /HPF
REF LAB TEST METHOD: ABNORMAL
SODIUM SERPL-SCNC: 138 MMOL/L (ref 136–145)
SP GR UR STRIP: 1.01 (ref 1–1.03)
SQUAMOUS #/AREA URNS HPF: ABNORMAL /HPF
TIBC SERPL-MCNC: 271 MCG/DL (ref 298–536)
TRANSFERRIN SERPL-MCNC: 182 MG/DL (ref 200–360)
UROBILINOGEN UR QL STRIP: ABNORMAL
WBC # UR STRIP: ABNORMAL /HPF
WBC NRBC COR # BLD AUTO: 9.56 10*3/MM3 (ref 3.4–10.8)

## 2025-03-12 PROCEDURE — 81001 URINALYSIS AUTO W/SCOPE: CPT

## 2025-03-12 PROCEDURE — 80048 BASIC METABOLIC PNL TOTAL CA: CPT

## 2025-03-12 PROCEDURE — 83970 ASSAY OF PARATHORMONE: CPT

## 2025-03-12 PROCEDURE — 36415 COLL VENOUS BLD VENIPUNCTURE: CPT

## 2025-03-12 PROCEDURE — 85025 COMPLETE CBC W/AUTO DIFF WBC: CPT

## 2025-03-12 PROCEDURE — 82570 ASSAY OF URINE CREATININE: CPT

## 2025-03-12 PROCEDURE — 87086 URINE CULTURE/COLONY COUNT: CPT

## 2025-03-12 PROCEDURE — 84100 ASSAY OF PHOSPHORUS: CPT

## 2025-03-12 PROCEDURE — 83540 ASSAY OF IRON: CPT

## 2025-03-12 PROCEDURE — 82728 ASSAY OF FERRITIN: CPT

## 2025-03-12 PROCEDURE — 84156 ASSAY OF PROTEIN URINE: CPT

## 2025-03-12 PROCEDURE — 84466 ASSAY OF TRANSFERRIN: CPT

## 2025-03-13 LAB — BACTERIA SPEC AEROBE CULT: NORMAL

## 2025-03-31 ENCOUNTER — HOSPITAL ENCOUNTER (OUTPATIENT)
Dept: MAMMOGRAPHY | Facility: HOSPITAL | Age: 70
Discharge: HOME | End: 2025-03-31
Payer: MEDICARE

## 2025-03-31 ENCOUNTER — HOSPITAL ENCOUNTER (OUTPATIENT)
Dept: ULTRASOUND IMAGING | Facility: HOSPITAL | Age: 70
Discharge: HOME | End: 2025-03-31
Payer: MEDICARE

## 2025-03-31 ENCOUNTER — HOSPITAL ENCOUNTER (OUTPATIENT)
Dept: BONE DENSITY | Facility: HOSPITAL | Age: 70
Discharge: HOME | End: 2025-03-31
Payer: MEDICARE

## 2025-03-31 DIAGNOSIS — Z12.31 VISIT FOR SCREENING MAMMOGRAM: ICD-10-CM

## 2025-03-31 DIAGNOSIS — M85.89 OSTEOPENIA OF MULTIPLE SITES: ICD-10-CM

## 2025-03-31 DIAGNOSIS — E04.1 THYROID NODULE: ICD-10-CM

## 2025-03-31 PROCEDURE — 77063 BREAST TOMOSYNTHESIS BI: CPT

## 2025-03-31 PROCEDURE — 77080 DXA BONE DENSITY AXIAL: CPT

## 2025-03-31 PROCEDURE — 77067 SCR MAMMO BI INCL CAD: CPT

## 2025-03-31 PROCEDURE — 76536 US EXAM OF HEAD AND NECK: CPT

## 2025-04-01 ENCOUNTER — RESULTS FOLLOW-UP (OUTPATIENT)
Dept: INTERNAL MEDICINE | Facility: CLINIC | Age: 70
End: 2025-04-01
Payer: MEDICARE

## 2025-05-13 ENCOUNTER — OFFICE VISIT (OUTPATIENT)
Dept: SURGERY | Facility: CLINIC | Age: 70
End: 2025-05-13
Payer: MEDICARE

## 2025-05-13 VITALS
DIASTOLIC BLOOD PRESSURE: 80 MMHG | BODY MASS INDEX: 38.52 KG/M2 | HEIGHT: 60 IN | OXYGEN SATURATION: 97 % | HEART RATE: 76 BPM | WEIGHT: 196.2 LBS | SYSTOLIC BLOOD PRESSURE: 130 MMHG

## 2025-05-13 DIAGNOSIS — Z12.11 SCREENING FOR COLON CANCER: ICD-10-CM

## 2025-05-13 DIAGNOSIS — N18.5 CKD (CHRONIC KIDNEY DISEASE) STAGE 5, GFR LESS THAN 15 ML/MIN: Primary | ICD-10-CM

## 2025-05-13 PROCEDURE — 1159F MED LIST DOCD IN RCRD: CPT | Performed by: SURGERY

## 2025-05-13 PROCEDURE — 99204 OFFICE O/P NEW MOD 45 MIN: CPT | Performed by: SURGERY

## 2025-05-13 PROCEDURE — 1160F RVW MEDS BY RX/DR IN RCRD: CPT | Performed by: SURGERY

## 2025-05-13 PROCEDURE — 3079F DIAST BP 80-89 MM HG: CPT | Performed by: SURGERY

## 2025-05-13 PROCEDURE — 3075F SYST BP GE 130 - 139MM HG: CPT | Performed by: SURGERY

## 2025-05-13 RX ORDER — ONDANSETRON 2 MG/ML
4 INJECTION INTRAMUSCULAR; INTRAVENOUS EVERY 6 HOURS PRN
OUTPATIENT
Start: 2025-05-13

## 2025-05-13 RX ORDER — HYDRALAZINE HYDROCHLORIDE 25 MG/1
TABLET, FILM COATED ORAL
COMMUNITY

## 2025-05-13 RX ORDER — AMOXICILLIN 250 MG
1 CAPSULE ORAL DAILY
Qty: 90 TABLET | Refills: 2 | Status: SHIPPED | OUTPATIENT
Start: 2025-05-13

## 2025-05-13 NOTE — H&P (VIEW-ONLY)
"Chief Complaint   Patient presents with    PD Cath Consult       Subjective      Ro Connor is a 69 y.o. female who is referred by Dr. Baum to be evaluated for peritoneal dialysis catheter placement. Patient has CKD stage 5 secondary to chronic Nsaids use and  is not on dialysis. Patient does have a AV access.  Patient has not had a recent intraabdominal infection. The patient reports having bowel movements every 2 to 3 days.  Patient did have a Colonoscopy 10 to 12 years ago.  She has history of colon polyps.    Home Evaluation: No    Past Medical History:   Diagnosis Date    Acid reflux     Anemia     Anxiety     Arthritis     OSTEO    Chronic pain     \"PAIN EVERYWHERE\":  [place]    CKD (chronic kidney disease) stage 4, GFR 15-29 ml/min     DDD (degenerative disc disease), cervical     DDD (degenerative disc disease), lumbosacral     DDD (degenerative disc disease), thoracolumbar     History of UTI     Hyperlipidemia     Hypertension     Osteoporosis 2005    osteopeia    Sleep apnea     CPAP    Stress incontinence     Thyroid nodule     STATES SEVERAL, JUST WATCHING       Past Surgical History:   Procedure Laterality Date    ARTERIOVENOUS FISTULA/SHUNT SURGERY Left 1/24/2025    Procedure: LEFT ARTERIOVENOUS FISTULA FORMATION;  Surgeon: Aiden Richards MD;  Location: [facility];  Service: Vascular;  Laterality: Left;    BACK SURGERY  12/2023    BURNED NERVE TO RELIEVE PAIN:  D/T LOWER BACK PAIN    CATARACT EXTRACTION Bilateral 2023    COLONOSCOPY  2019    hemorrhoids, mild diverticulosis    ENDOSCOPY  2009    GASTRIC SLEEVE LAPAROSCOPIC N/A 10/30/2019    Procedure: GASTRIC SLEEVE LAPAROSCOPIC With Hiatal Hernia Repair;  Surgeon: Dane Delgado Jr., MD;  Location: [facility];  Service: Bariatric    TUMOR REMOVAL      STATES ON BACK OF HEAD         Current Outpatient Medications:     Ascorbic Acid (VITAMIN C PO), Take 1,000 mg by mouth Every Night., Disp: , Rfl:    " " calcium carbonate (TUMS) 500 MG chewable tablet, Chew 1 tablet 3 (Three) Times a Day., Disp: , Rfl:     carvedilol (COREG) 12.5 MG tablet, Take 1 tablet by mouth 2 (Two) Times a Day With Meals., Disp: 180 tablet, Rfl: 0    cetirizine (zyrTEC) 10 MG tablet, Take 1 tablet by mouth Daily., Disp: 90 tablet, Rfl: 1    cholecalciferol 25 MCG (1000 UT) tablet, Take 1 tablet by mouth Daily., Disp: , Rfl:     cyclobenzaprine (FLEXERIL) 10 MG tablet, Take 1 tablet by mouth 3 (Three) Times a Day As Needed for Muscle Spasms., Disp: 180 tablet, Rfl: 1    ferrous sulfate (SV Iron) 325 (65 FE) MG tablet, Take 1 tablet by mouth Every Other Day., Disp: 45 tablet, Rfl: 1    furosemide (LASIX) 40 MG tablet, Take 1 tablet by mouth Daily., Disp: , Rfl:     HYDROcodone-acetaminophen (NORCO) 7.5-325 MG per tablet, Take 1 tablet by mouth 3 (Three) Times a Day. CHRONIC \"ALL OVER PAIN\", Disp: , Rfl:     hydrOXYzine (ATARAX) 50 MG tablet, Take 1 tablet by mouth 3 (Three) Times a Day As Needed for Itching. (Patient taking differently: Take 1 tablet by mouth 2 (Two) Times a Day. ITCHING), Disp: 270 tablet, Rfl: 1    Melatonin 10 MG capsule, Take 1 capsule by mouth As Needed., Disp: , Rfl:     pantoprazole (Protonix) 40 MG EC tablet, Take 1 tablet by mouth Daily., Disp: 90 tablet, Rfl: 1    Pediatric Multivitamins-Iron (Flintstones Complete) 18 MG chewable tablet chewable tablet, Chew 1 tablet Daily., Disp: , Rfl:     Probiotic Product (FORTIFY PROBIOTIC WOMENS PO), Take 1 capsule by mouth Daily., Disp: , Rfl:     simvastatin (ZOCOR) 40 MG tablet, Take 1 tablet by mouth Every Night., Disp: 90 tablet, Rfl: 1    traZODone (DESYREL) 100 MG tablet, Take 1 tablet by mouth At Night As Needed for Sleep., Disp: 90 tablet, Rfl: 1    hydrALAZINE (APRESOLINE) 25 MG tablet, TAKE 1 TABLET BY MOUTH IN THE MORNING, 1 TABLET IN THE EVENING, AND 1 TABLET BEFORE BEDTIME (Patient not taking: Reported on 5/13/2025), Disp: , Rfl:     sennosides-docusate " (senna-docusate sodium) 8.6-50 MG per tablet, Take 1 tablet by mouth Daily., Disp: 90 tablet, Rfl: 2    Allergies   Allergen Reactions    Contrast Dye (Echo Or Unknown Ct/Mr) Other (See Comments)     CHRONIC KIDNEY DISEASE STAGE IV    Nsaids Other (See Comments)     Cannot take due to kidneys       Family History   Problem Relation Age of Onset    Alcohol abuse Father     Early death Father     Obesity Mother     Hypertension Mother     Alcohol abuse Mother     Arthritis Mother         all of my brothers and sisterand there is 9 childerin    COPD Mother     Obesity Brother     Hypertension Brother     Heart attack Brother     Alcohol abuse Brother     Drug abuse Brother     Hypertension Brother     COPD Brother     Obesity Sister     Hypertension Sister     Stroke Sister     Lung cancer Sister     Alcohol abuse Sister     Arthritis Sister     Hypertension Sister     Stroke Sister     Arthritis Sister     Hypertension Paternal Grandmother     Obesity Maternal Grandmother     Hypertension Maternal Grandmother     Cancer Maternal Grandmother         BRAIN    Alcohol abuse Maternal Grandmother     Lung cancer Maternal Grandfather     Cancer Maternal Grandfather     Arthritis Sister     Cancer Maternal Uncle         3 of my mothers brothers    Drug abuse Daughter     Malig Hyperthermia Neg Hx     Breast cancer Neg Hx     Uterine cancer Neg Hx     Ovarian cancer Neg Hx     Colon cancer Neg Hx        Social History     Socioeconomic History    Marital status:    Tobacco Use    Smoking status: Never    Smokeless tobacco: Never   Vaping Use    Vaping status: Never Used   Substance and Sexual Activity    Alcohol use: Yes     Comment: HOLIDAYS AND VACATION    Drug use: No    Sexual activity: Not Currently     Partners: Male     Birth control/protection: Post-menopausal         REVIEW OF SYSTEMS    As per HPI    Physical Examination  /80 (BP Location: Right arm, Patient Position: Sitting, Cuff Size: Adult)   Pulse  "76   Ht 152.4 cm (60\")   Wt 89 kg (196 lb 3.2 oz)   SpO2 97%   BMI 38.32 kg/m²   Body mass index is 38.32 kg/m².  Physical Exam  Constitutional:       Appearance: Normal appearance. She is obese.   HENT:      Head: Normocephalic and atraumatic.      Mouth/Throat:      Pharynx: Oropharynx is clear.   Eyes:      General: No scleral icterus.     Conjunctiva/sclera: Conjunctivae normal.   Cardiovascular:      Rate and Rhythm: Normal rate and regular rhythm.   Pulmonary:      Effort: Pulmonary effort is normal.   Abdominal:      General: Bowel sounds are normal. There is no distension.      Palpations: Abdomen is soft. There is no mass.      Tenderness: There is no abdominal tenderness.      Hernia: No hernia is present.   Musculoskeletal:      Cervical back: Normal range of motion.   Skin:     General: Skin is warm.      Capillary Refill: Capillary refill takes less than 2 seconds.   Neurological:      General: No focal deficit present.      Mental Status: She is alert. Mental status is at baseline.   Psychiatric:         Mood and Affect: Mood normal.         Behavior: Behavior normal.       Class 2 Severe Obesity (BMI >=35 and <=39.9). Obesity-related health conditions include the following: obstructive sleep apnea, GERD, and urinary stress incontinence. Obesity is improving with treatment. BMI is is above average; BMI management plan is completed. We discussed portion control and increasing exercise.      Labs reviewed 3/12/2025:   White blood cell count 9.5, hemoglobin 10, platelets 210  Creatinine 3.2, BUN 48, CO2 21.5, GFR 14.7  Phosphorus 4.8    Assessment:   Ro Connor is a 69 y.o. female with CKD due to chronic NSAID use that is not on dialysis and will need peritoneal dialysis catheter placement.   Patient is due for colonoscopy.  Recommend that she undergoes colonoscopy prior to peritoneal dialysis catheter placement.  Risk and benefits of procedure including bleeding, infection, perforation " discussed in detail with the patient that verbalized understanding and agreed with the plan      The laparoscopic peritoneal dialysis catheter procedure was explained in detail to the patient including risks and benefits.  The benefits including the possibility of having dialysis at home without the side effects of the hemodialysis.  The risks including but not limited to catheter dislodgment, obstruction, malfunction, bleeding, infection and possible injury to surrounding organs during peritoneal access. She is interested in proceeding with laparoscopic placement of peritoneal dialysis catheter when cleared by nephrologist . The patient understands that the catheter will not be used for dialysis for a period of approximately 2 weeks after its placement and that during this time they should not shower until the exit site is completely healed. The patient underwent at least one training session with the peritoneal dialysis nurse . Patient verbalized understanding and agreed with the plan. All questions were answered at this time.      Plan:     -Colonoscopy due to personal history of colon polyps  - Laparoscopic peritoneal dialysis catheter placement whenever ready.  Patient to call    Orders Placed This Encounter   Procedures    Provide Patient With Instructions on NPO Status     Standing Status:   Future     Expected Date:   5/18/2025    Provide Chlorhexidine Skin Prep Wipes and Instructions     Chlorhexidine Skin Prep and Instructions For All Patients Having a Procedure Requiring and Outward Incision if Not Allergic. If Allergic, Give Antibacterial Skin Wipes and Instructions. Do Not Use for Facial Cases or on Any Mucous Membranes.     Standing Status:   Future     Expected Date:   5/18/2025         Vito Mesa MD  General, Minimally Invasive and Endoscopic Surgery  [facility]    [address] [place] [address], [address]  [place], [place], [address]  P: [phone]  F: [phone]

## 2025-05-13 NOTE — PROGRESS NOTES
"Chief Complaint   Patient presents with    PD Cath Consult       Subjective      Ro Connor is a 69 y.o. female who is referred by Dr. Baum to be evaluated for peritoneal dialysis catheter placement. Patient has CKD stage 5 secondary to chronic Nsaids use and  is not on dialysis. Patient does have a AV access.  Patient has not had a recent intraabdominal infection. The patient reports having bowel movements every 2 to 3 days.  Patient did have a Colonoscopy 10 to 12 years ago.  She has history of colon polyps.    Home Evaluation: No    Past Medical History:   Diagnosis Date    Acid reflux     Anemia     Anxiety     Arthritis     OSTEO    Chronic pain     \"PAIN EVERYWHERE\":  [place]    CKD (chronic kidney disease) stage 4, GFR 15-29 ml/min     DDD (degenerative disc disease), cervical     DDD (degenerative disc disease), lumbosacral     DDD (degenerative disc disease), thoracolumbar     History of UTI     Hyperlipidemia     Hypertension     Osteoporosis 2005    osteopeia    Sleep apnea     CPAP    Stress incontinence     Thyroid nodule     STATES SEVERAL, JUST WATCHING       Past Surgical History:   Procedure Laterality Date    ARTERIOVENOUS FISTULA/SHUNT SURGERY Left 1/24/2025    Procedure: LEFT ARTERIOVENOUS FISTULA FORMATION;  Surgeon: Aiden Richards MD;  Location: [facility];  Service: Vascular;  Laterality: Left;    BACK SURGERY  12/2023    BURNED NERVE TO RELIEVE PAIN:  D/T LOWER BACK PAIN    CATARACT EXTRACTION Bilateral 2023    COLONOSCOPY  2019    hemorrhoids, mild diverticulosis    ENDOSCOPY  2009    GASTRIC SLEEVE LAPAROSCOPIC N/A 10/30/2019    Procedure: GASTRIC SLEEVE LAPAROSCOPIC With Hiatal Hernia Repair;  Surgeon: Dane Delgado Jr., MD;  Location: [facility];  Service: Bariatric    TUMOR REMOVAL      STATES ON BACK OF HEAD         Current Outpatient Medications:     Ascorbic Acid (VITAMIN C PO), Take 1,000 mg by mouth Every Night., Disp: , Rfl:    " " calcium carbonate (TUMS) 500 MG chewable tablet, Chew 1 tablet 3 (Three) Times a Day., Disp: , Rfl:     carvedilol (COREG) 12.5 MG tablet, Take 1 tablet by mouth 2 (Two) Times a Day With Meals., Disp: 180 tablet, Rfl: 0    cetirizine (zyrTEC) 10 MG tablet, Take 1 tablet by mouth Daily., Disp: 90 tablet, Rfl: 1    cholecalciferol 25 MCG (1000 UT) tablet, Take 1 tablet by mouth Daily., Disp: , Rfl:     cyclobenzaprine (FLEXERIL) 10 MG tablet, Take 1 tablet by mouth 3 (Three) Times a Day As Needed for Muscle Spasms., Disp: 180 tablet, Rfl: 1    ferrous sulfate (SV Iron) 325 (65 FE) MG tablet, Take 1 tablet by mouth Every Other Day., Disp: 45 tablet, Rfl: 1    furosemide (LASIX) 40 MG tablet, Take 1 tablet by mouth Daily., Disp: , Rfl:     HYDROcodone-acetaminophen (NORCO) 7.5-325 MG per tablet, Take 1 tablet by mouth 3 (Three) Times a Day. CHRONIC \"ALL OVER PAIN\", Disp: , Rfl:     hydrOXYzine (ATARAX) 50 MG tablet, Take 1 tablet by mouth 3 (Three) Times a Day As Needed for Itching. (Patient taking differently: Take 1 tablet by mouth 2 (Two) Times a Day. ITCHING), Disp: 270 tablet, Rfl: 1    Melatonin 10 MG capsule, Take 1 capsule by mouth As Needed., Disp: , Rfl:     pantoprazole (Protonix) 40 MG EC tablet, Take 1 tablet by mouth Daily., Disp: 90 tablet, Rfl: 1    Pediatric Multivitamins-Iron (Flintstones Complete) 18 MG chewable tablet chewable tablet, Chew 1 tablet Daily., Disp: , Rfl:     Probiotic Product (FORTIFY PROBIOTIC WOMENS PO), Take 1 capsule by mouth Daily., Disp: , Rfl:     simvastatin (ZOCOR) 40 MG tablet, Take 1 tablet by mouth Every Night., Disp: 90 tablet, Rfl: 1    traZODone (DESYREL) 100 MG tablet, Take 1 tablet by mouth At Night As Needed for Sleep., Disp: 90 tablet, Rfl: 1    hydrALAZINE (APRESOLINE) 25 MG tablet, TAKE 1 TABLET BY MOUTH IN THE MORNING, 1 TABLET IN THE EVENING, AND 1 TABLET BEFORE BEDTIME (Patient not taking: Reported on 5/13/2025), Disp: , Rfl:     sennosides-docusate " (senna-docusate sodium) 8.6-50 MG per tablet, Take 1 tablet by mouth Daily., Disp: 90 tablet, Rfl: 2    Allergies   Allergen Reactions    Contrast Dye (Echo Or Unknown Ct/Mr) Other (See Comments)     CHRONIC KIDNEY DISEASE STAGE IV    Nsaids Other (See Comments)     Cannot take due to kidneys       Family History   Problem Relation Age of Onset    Alcohol abuse Father     Early death Father     Obesity Mother     Hypertension Mother     Alcohol abuse Mother     Arthritis Mother         all of my brothers and sisterand there is 9 childerin    COPD Mother     Obesity Brother     Hypertension Brother     Heart attack Brother     Alcohol abuse Brother     Drug abuse Brother     Hypertension Brother     COPD Brother     Obesity Sister     Hypertension Sister     Stroke Sister     Lung cancer Sister     Alcohol abuse Sister     Arthritis Sister     Hypertension Sister     Stroke Sister     Arthritis Sister     Hypertension Paternal Grandmother     Obesity Maternal Grandmother     Hypertension Maternal Grandmother     Cancer Maternal Grandmother         BRAIN    Alcohol abuse Maternal Grandmother     Lung cancer Maternal Grandfather     Cancer Maternal Grandfather     Arthritis Sister     Cancer Maternal Uncle         3 of my mothers brothers    Drug abuse Daughter     Malig Hyperthermia Neg Hx     Breast cancer Neg Hx     Uterine cancer Neg Hx     Ovarian cancer Neg Hx     Colon cancer Neg Hx        Social History     Socioeconomic History    Marital status:    Tobacco Use    Smoking status: Never    Smokeless tobacco: Never   Vaping Use    Vaping status: Never Used   Substance and Sexual Activity    Alcohol use: Yes     Comment: HOLIDAYS AND VACATION    Drug use: No    Sexual activity: Not Currently     Partners: Male     Birth control/protection: Post-menopausal         REVIEW OF SYSTEMS    As per HPI    Physical Examination  /80 (BP Location: Right arm, Patient Position: Sitting, Cuff Size: Adult)   Pulse  "76   Ht 152.4 cm (60\")   Wt 89 kg (196 lb 3.2 oz)   SpO2 97%   BMI 38.32 kg/m²   Body mass index is 38.32 kg/m².  Physical Exam  Constitutional:       Appearance: Normal appearance. She is obese.   HENT:      Head: Normocephalic and atraumatic.      Mouth/Throat:      Pharynx: Oropharynx is clear.   Eyes:      General: No scleral icterus.     Conjunctiva/sclera: Conjunctivae normal.   Cardiovascular:      Rate and Rhythm: Normal rate and regular rhythm.   Pulmonary:      Effort: Pulmonary effort is normal.   Abdominal:      General: Bowel sounds are normal. There is no distension.      Palpations: Abdomen is soft. There is no mass.      Tenderness: There is no abdominal tenderness.      Hernia: No hernia is present.   Musculoskeletal:      Cervical back: Normal range of motion.   Skin:     General: Skin is warm.      Capillary Refill: Capillary refill takes less than 2 seconds.   Neurological:      General: No focal deficit present.      Mental Status: She is alert. Mental status is at baseline.   Psychiatric:         Mood and Affect: Mood normal.         Behavior: Behavior normal.       Class 2 Severe Obesity (BMI >=35 and <=39.9). Obesity-related health conditions include the following: obstructive sleep apnea, GERD, and urinary stress incontinence. Obesity is improving with treatment. BMI is is above average; BMI management plan is completed. We discussed portion control and increasing exercise.      Labs reviewed 3/12/2025:   White blood cell count 9.5, hemoglobin 10, platelets 210  Creatinine 3.2, BUN 48, CO2 21.5, GFR 14.7  Phosphorus 4.8    Assessment:   Ro Connor is a 69 y.o. female with CKD due to chronic NSAID use that is not on dialysis and will need peritoneal dialysis catheter placement.   Patient is due for colonoscopy.  Recommend that she undergoes colonoscopy prior to peritoneal dialysis catheter placement.  Risk and benefits of procedure including bleeding, infection, perforation " discussed in detail with the patient that verbalized understanding and agreed with the plan      The laparoscopic peritoneal dialysis catheter procedure was explained in detail to the patient including risks and benefits.  The benefits including the possibility of having dialysis at home without the side effects of the hemodialysis.  The risks including but not limited to catheter dislodgment, obstruction, malfunction, bleeding, infection and possible injury to surrounding organs during peritoneal access. She is interested in proceeding with laparoscopic placement of peritoneal dialysis catheter when cleared by nephrologist . The patient understands that the catheter will not be used for dialysis for a period of approximately 2 weeks after its placement and that during this time they should not shower until the exit site is completely healed. The patient underwent at least one training session with the peritoneal dialysis nurse . Patient verbalized understanding and agreed with the plan. All questions were answered at this time.      Plan:     -Colonoscopy due to personal history of colon polyps  - Laparoscopic peritoneal dialysis catheter placement whenever ready.  Patient to call    Orders Placed This Encounter   Procedures    Provide Patient With Instructions on NPO Status     Standing Status:   Future     Expected Date:   5/18/2025    Provide Chlorhexidine Skin Prep Wipes and Instructions     Chlorhexidine Skin Prep and Instructions For All Patients Having a Procedure Requiring and Outward Incision if Not Allergic. If Allergic, Give Antibacterial Skin Wipes and Instructions. Do Not Use for Facial Cases or on Any Mucous Membranes.     Standing Status:   Future     Expected Date:   5/18/2025         Vito Mesa MD  General, Minimally Invasive and Endoscopic Surgery  [facility]    [address] [place] [address], [address]  [place], [place], [address]  P: [phone]  F: [phone]

## 2025-05-15 ENCOUNTER — LAB (OUTPATIENT)
Facility: HOSPITAL | Age: 70
End: 2025-05-15
Payer: MEDICARE

## 2025-05-15 ENCOUNTER — TRANSCRIBE ORDERS (OUTPATIENT)
Dept: ADMINISTRATIVE | Facility: HOSPITAL | Age: 70
End: 2025-05-15
Payer: MEDICARE

## 2025-05-15 DIAGNOSIS — N18.4 CHRONIC KIDNEY DISEASE, STAGE IV (SEVERE): ICD-10-CM

## 2025-05-15 DIAGNOSIS — N18.4 CHRONIC KIDNEY DISEASE, STAGE IV (SEVERE): Primary | ICD-10-CM

## 2025-05-15 LAB
ALBUMIN SERPL-MCNC: 4.1 G/DL (ref 3.5–5.2)
ANION GAP SERPL CALCULATED.3IONS-SCNC: 9.9 MMOL/L (ref 5–15)
BACTERIA UR QL AUTO: ABNORMAL /HPF
BASOPHILS # BLD AUTO: 0.09 10*3/MM3 (ref 0–0.2)
BASOPHILS NFR BLD AUTO: 0.7 % (ref 0–1.5)
BILIRUB UR QL STRIP: NEGATIVE
BUN SERPL-MCNC: 41 MG/DL (ref 8–23)
BUN/CREAT SERPL: 12.8 (ref 7–25)
CALCIUM SPEC-SCNC: 9.2 MG/DL (ref 8.6–10.5)
CHLORIDE SERPL-SCNC: 105 MMOL/L (ref 98–107)
CLARITY UR: ABNORMAL
CO2 SERPL-SCNC: 24.1 MMOL/L (ref 22–29)
COLOR UR: YELLOW
CREAT SERPL-MCNC: 3.2 MG/DL (ref 0.57–1)
CREAT UR-MCNC: 40.1 MG/DL
DEPRECATED RDW RBC AUTO: 45.9 FL (ref 37–54)
EGFRCR SERPLBLD CKD-EPI 2021: 15.1 ML/MIN/1.73
EOSINOPHIL # BLD AUTO: 0.48 10*3/MM3 (ref 0–0.4)
EOSINOPHIL NFR BLD AUTO: 3.9 % (ref 0.3–6.2)
ERYTHROCYTE [DISTWIDTH] IN BLOOD BY AUTOMATED COUNT: 13.4 % (ref 12.3–15.4)
GLUCOSE SERPL-MCNC: 99 MG/DL (ref 65–99)
GLUCOSE UR STRIP-MCNC: NEGATIVE MG/DL
HCT VFR BLD AUTO: 31.5 % (ref 34–46.6)
HGB BLD-MCNC: 10 G/DL (ref 12–15.9)
HGB UR QL STRIP.AUTO: ABNORMAL
HYALINE CASTS UR QL AUTO: ABNORMAL /LPF
IMM GRANULOCYTES # BLD AUTO: 0.06 10*3/MM3 (ref 0–0.05)
IMM GRANULOCYTES NFR BLD AUTO: 0.5 % (ref 0–0.5)
KETONES UR QL STRIP: NEGATIVE
LEUKOCYTE ESTERASE UR QL STRIP.AUTO: ABNORMAL
LYMPHOCYTES # BLD AUTO: 2.62 10*3/MM3 (ref 0.7–3.1)
LYMPHOCYTES NFR BLD AUTO: 21.5 % (ref 19.6–45.3)
MCH RBC QN AUTO: 29.9 PG (ref 26.6–33)
MCHC RBC AUTO-ENTMCNC: 31.7 G/DL (ref 31.5–35.7)
MCV RBC AUTO: 94.3 FL (ref 79–97)
MONOCYTES # BLD AUTO: 1.06 10*3/MM3 (ref 0.1–0.9)
MONOCYTES NFR BLD AUTO: 8.7 % (ref 5–12)
NEUTROPHILS NFR BLD AUTO: 64.7 % (ref 42.7–76)
NEUTROPHILS NFR BLD AUTO: 7.89 10*3/MM3 (ref 1.7–7)
NITRITE UR QL STRIP: POSITIVE
NRBC BLD AUTO-RTO: 0 /100 WBC (ref 0–0.2)
PH UR STRIP.AUTO: 6 [PH] (ref 5–8)
PHOSPHATE SERPL-MCNC: 3.5 MG/DL (ref 2.5–4.5)
PLATELET # BLD AUTO: 229 10*3/MM3 (ref 140–450)
PMV BLD AUTO: 11 FL (ref 6–12)
POTASSIUM SERPL-SCNC: 4.3 MMOL/L (ref 3.5–5.2)
PROT ?TM UR-MCNC: 18.2 MG/DL
PROT UR QL STRIP: ABNORMAL
PROT/CREAT UR: 0.45 MG/G{CREAT}
PTH-INTACT SERPL-MCNC: 156 PG/ML (ref 15–65)
RBC # BLD AUTO: 3.34 10*6/MM3 (ref 3.77–5.28)
RBC # UR STRIP: ABNORMAL /HPF
REF LAB TEST METHOD: ABNORMAL
SODIUM SERPL-SCNC: 139 MMOL/L (ref 136–145)
SP GR UR STRIP: 1.01 (ref 1–1.03)
SQUAMOUS #/AREA URNS HPF: ABNORMAL /HPF
UROBILINOGEN UR QL STRIP: ABNORMAL
WBC # UR STRIP: ABNORMAL /HPF
WBC NRBC COR # BLD AUTO: 12.2 10*3/MM3 (ref 3.4–10.8)

## 2025-05-15 PROCEDURE — 80069 RENAL FUNCTION PANEL: CPT

## 2025-05-15 PROCEDURE — 85025 COMPLETE CBC W/AUTO DIFF WBC: CPT

## 2025-05-15 PROCEDURE — 36415 COLL VENOUS BLD VENIPUNCTURE: CPT

## 2025-05-15 PROCEDURE — 83970 ASSAY OF PARATHORMONE: CPT

## 2025-05-15 PROCEDURE — 81001 URINALYSIS AUTO W/SCOPE: CPT

## 2025-05-15 PROCEDURE — 82570 ASSAY OF URINE CREATININE: CPT

## 2025-05-15 PROCEDURE — 84156 ASSAY OF PROTEIN URINE: CPT

## 2025-05-19 ENCOUNTER — HOSPITAL ENCOUNTER (OUTPATIENT)
Facility: HOSPITAL | Age: 70
Discharge: HOME | End: 2025-05-19
Admitting: NURSE PRACTITIONER
Payer: MEDICARE

## 2025-05-19 ENCOUNTER — OFFICE VISIT (OUTPATIENT)
Age: 70
End: 2025-05-19
Payer: MEDICARE

## 2025-05-19 VITALS
DIASTOLIC BLOOD PRESSURE: 72 MMHG | RESPIRATION RATE: 17 BRPM | BODY MASS INDEX: 38.48 KG/M2 | WEIGHT: 196 LBS | SYSTOLIC BLOOD PRESSURE: 121 MMHG | HEIGHT: 60 IN | HEART RATE: 73 BPM

## 2025-05-19 DIAGNOSIS — N18.4 CKD (CHRONIC KIDNEY DISEASE) STAGE 4, GFR 15-29 ML/MIN: ICD-10-CM

## 2025-05-19 DIAGNOSIS — T82.858A ARTERIOVENOUS FISTULA STENOSIS, INITIAL ENCOUNTER: Primary | ICD-10-CM

## 2025-05-19 DIAGNOSIS — T82.898A STEAL SYNDROME AS COMPLICATION OF DIALYSIS ACCESS, INITIAL ENCOUNTER: ICD-10-CM

## 2025-05-19 LAB
BH CV VAS DIALYSIS ARTERIAL ANASTOMOSIS DIAMETER: 0.4 CM
BH CV VAS DIALYSIS ARTERIAL ANASTOMOSIS EDV: 489 CM/SEC
BH CV VAS DIALYSIS ARTERIAL ANASTOMOSIS PSV: 739 CM/SEC
BH CV VAS DIALYSIS CONDUIT DIST DEPTH: 1.5 CM
BH CV VAS DIALYSIS CONDUIT DIST DIAMETER: 0.7 CM
BH CV VAS DIALYSIS CONDUIT DIST EDV: 62.7 CM/SEC
BH CV VAS DIALYSIS CONDUIT DIST FLOW VOL: 1039 ML/MIN
BH CV VAS DIALYSIS CONDUIT DIST PSV: 105 CM/SEC
BH CV VAS DIALYSIS CONDUIT MID DEPTH: 0.8 CM
BH CV VAS DIALYSIS CONDUIT MID DIAMETER: 0.9 CM
BH CV VAS DIALYSIS CONDUIT MID EDV: 57.4 CM/SEC
BH CV VAS DIALYSIS CONDUIT MID FLOW VOL: 1088 ML/MIN
BH CV VAS DIALYSIS CONDUIT MID PSV: 116 CM/SEC
BH CV VAS DIALYSIS CONDUIT MID/DIST DEPTH: 1.2 CM
BH CV VAS DIALYSIS CONDUIT MID/DIST DIAMETER: 0.7 CM
BH CV VAS DIALYSIS CONDUIT MID/DIST EDV: 74.6 CM/SEC
BH CV VAS DIALYSIS CONDUIT MID/DIST PSV: 123 CM/SEC
BH CV VAS DIALYSIS CONDUIT PROX DEPTH: 0.3 CM
BH CV VAS DIALYSIS CONDUIT PROX DIAMETER: 1 CM
BH CV VAS DIALYSIS CONDUIT PROX EDV: 108 CM/SEC
BH CV VAS DIALYSIS CONDUIT PROX PSV: 190 CM/SEC
BH CV VAS DIALYSIS CONDUIT PROX/MID DEPTH: 0.3 CM
BH CV VAS DIALYSIS CONDUIT PROX/MID DIAMETER: 0.9 CM
BH CV VAS DIALYSIS CONDUIT PROX/MID EDV: 67.8 CM/SEC
BH CV VAS DIALYSIS CONDUIT PROX/MID FLOW VOL: 905 ML/MIN
BH CV VAS DIALYSIS CONDUIT PROX/MID PSV: 113 CM/SEC
BH CV VAS DIALYSIS LEFT BRANCH 1 DIAMETER: 0.49 CM
BH CV VAS DIALYSIS PRE-INFLOW BRACHIAL DIAMETER: 0.5 CM
BH CV VAS DIALYSIS PRE-INFLOW BRACHIAL EDV: 173 CM/SEC
BH CV VAS DIALYSIS PRE-INFLOW BRACHIAL FLOW VOL: 1208 ML/MIN
BH CV VAS DIALYSIS PRE-INFLOW BRACHIAL PSV: 308 CM/SEC
BH CV VAS DIALYSIS VENOUS OUTFLOW CEPHALIC ARCH DIAMETE: 0.5 CM
BH CV VAS DIALYSIS VENOUS OUTFLOW CEPHALIC ARCH EDV: 103 CM/SEC
BH CV VAS DIALYSIS VENOUS OUTFLOW CEPHALIC ARCH PSV: 154 CM/SEC
BH CV VAS DIALYSIS VENOUS OUTFLOW CEPHALIC VEIN DIAMETE: 0.7 CM
BH CV VAS DIALYSIS VENOUS OUTFLOW CEPHALIC VEIN EDV: 62.1 CM/SEC
BH CV VAS DIALYSIS VENOUS OUTFLOW CEPHALIC VEIN PSV: 95.9 CM/SEC
BH CV VAS DIALYSIS VENOUS OUTFLOW SUBCL-CEPHALIC JX DIAMETER: 0.3 CM
BH CV VAS DIALYSIS VENOUS OUTFLOW SUBCL-CEPHALIC JX EDV: 145 CM/SEC
BH CV VAS DIALYSIS VENOUS OUTFLOW SUBCL-CEPHALIC JX PSV: 234 CM/SEC
BH CV VAS DIALYSIS VENOUS OUTFLOW SUBCLAVIAN DIAMETER: 0.9 CM
BH CV VAS DIALYSIS VENOUS OUTFLOW SUBCLAVIAN EDV: 123 CM/SEC
BH CV VAS DIALYSIS VENOUS OUTFLOW SUBCLAVIAN PSV: 198 CM/SEC

## 2025-05-19 PROCEDURE — 93990 DOPPLER FLOW TESTING: CPT

## 2025-05-19 NOTE — PROGRESS NOTES
"Chief Complaint  Hemodialysis Access    Subjective          HPI: Ro Connor presents to [facility] V[facility]ULAR [facility]ERY who is here today for follow-up regarding left AV fistula formation.  Surgery was done on 2/24/2025.  Patient is also seen general surgery for evaluation for PD catheter.  Currently scheduled for 6/5/2025.  History of Present Illness  The patient presents for evaluation of cold hands.    She underwent a left arm arteriovenous fistula procedure approximately 3 months ago. Postoperatively, she has been experiencing cold sensations in her hand, a symptom that was not present prior to the surgery. Additionally, she reports that two of her fingers exhibit blanching and associated pain. The use of gloves appears to alleviate these symptoms. She has a history of cold intolerance, particularly during winter months, which exacerbates her current condition. Despite these challenges, she reports satisfactory healing of the surgical site and has developed coping mechanisms to manage her symptoms.    Review of Systems     Objective   Vital Signs:  /72 (BP Location: Right arm, Patient Position: Sitting, Cuff Size: Large Adult)   Pulse 73   Resp 17   Ht 152 cm (59.84\")   Wt 88.9 kg (196 lb)   BMI 38.48 kg/m²   Estimated body mass index is 38.48 kg/m² as calculated from the following:    Height as of this encounter: 152 cm (59.84\").    Weight as of this encounter: 88.9 kg (196 lb).              Physical Exam  Physical Exam  Fistula was examined.  Is a palpable thrill.  Radial pulses nonpalpable.  No cyanosis or tissue loss on the left hand        Result Review :    Common labs          1/13/2025    14:11 3/12/2025    15:14 5/15/2025    13:23   Common Labs   Glucose 89  88  99    BUN 58  48  41    Creatinine 3.23  3.28  3.20    Sodium 137  138  139    Potassium 4.6  5.1  4.3    Chloride 104  103  105    Calcium 8.9  9.2  9.2    Albumin 3.7   4.1    WBC 9.66  9.56  12.20  "   Hemoglobin 10.0  10.0  10.0    Hematocrit 29.7  31.5  31.5    Platelets 219  210  229      Results  Imaging  Ultrasound study shows flow through the fistula is about a liter a minute, no significant narrowing observed.     Most notable findings include: Creatinine 3.2 on 5/15/2025.  Hemoglobin 10.0, platelet count 229    Images Reviewed:  Review of the operative report shows that the vein was a bit smaller than expected but able to use the bifurcation to make a reasonable pugh for an anastomosis.  Brachial artery was small but adequate.        Ro Connor  reports that she has never smoked. She has never been exposed to tobacco smoke. She has never used smokeless tobacco..        Patient or patient representative verbalized consent for the use of Ambient Listening during the visit with  Aiden Richards MD for chart documentation. 5/20/2025  08:22 EDT        Assessment and Plan     Assessment & Plan  Arteriovenous fistula stenosis, initial encounter    Orders:    Duplex Hemodialysis Access CAR; Future    Steal syndrome as complication of dialysis access, initial encounter         CKD (chronic kidney disease) stage 4, GFR 15-29 ml/min              Assessment & Plan  1. Steal syndrome.  The patient's symptoms of cold hands and fingers turning white post-surgery are indicative of steal syndrome. The ultrasound study shows that the flow through the fistula is about a liter per minute, which is adequate, and there is no significant narrowing. The size of the fistula is also appropriate. She has been managing the symptoms with heated gloves, which have been effective. The fistula will be left as it is since it serves as a backup for dialysis although we did discuss the risk, benefits, alternatives to ligation. If peritoneal dialysis ever stops working or gets clogged, she can switch to hemodialysis temporarily or long-term. If symptoms worsen, reconsideration of the current plan may be  necessary.    Follow-up  The patient will follow up in 6 months or sooner if any changes occur.    PROCEDURE  The patient underwent a left arm AV fistula procedure approximately 3 months ago.         Follow Up     Return in about 6 months (around 11/19/2025).  Patient was given instructions and counseling regarding her condition or for health maintenance advice. Please see specific information pulled into the AVS if appropriate.

## 2025-06-05 ENCOUNTER — ANESTHESIA EVENT (OUTPATIENT)
Dept: GASTROENTEROLOGY | Facility: HOSPITAL | Age: 70
End: 2025-06-05
Payer: MEDICARE

## 2025-06-05 ENCOUNTER — ANESTHESIA (OUTPATIENT)
Dept: GASTROENTEROLOGY | Facility: HOSPITAL | Age: 70
End: 2025-06-05
Payer: MEDICARE

## 2025-06-05 ENCOUNTER — HOSPITAL ENCOUNTER (OUTPATIENT)
Facility: HOSPITAL | Age: 70
Setting detail: HOSPITAL OUTPATIENT SURGERY
Discharge: HOME | End: 2025-06-05
Attending: SURGERY | Admitting: SURGERY
Payer: MEDICARE

## 2025-06-05 VITALS
OXYGEN SATURATION: 100 % | HEIGHT: 59 IN | HEART RATE: 84 BPM | BODY MASS INDEX: 39.51 KG/M2 | WEIGHT: 196 LBS | RESPIRATION RATE: 19 BRPM | SYSTOLIC BLOOD PRESSURE: 170 MMHG | DIASTOLIC BLOOD PRESSURE: 81 MMHG

## 2025-06-05 PROBLEM — K63.5 COLON POLYPS: Status: ACTIVE | Noted: 2025-06-05

## 2025-06-05 PROBLEM — Z12.11 SCREENING FOR COLON CANCER: Status: RESOLVED | Noted: 2025-05-13 | Resolved: 2025-06-05

## 2025-06-05 PROCEDURE — 25010000002 LIDOCAINE 2% SOLUTION: Performed by: NURSE ANESTHETIST, CERTIFIED REGISTERED

## 2025-06-05 PROCEDURE — 25010000002 GLYCOPYRROLATE 0.2 MG/ML SOLUTION: Performed by: NURSE ANESTHETIST, CERTIFIED REGISTERED

## 2025-06-05 PROCEDURE — 25810000003 SODIUM CHLORIDE 0.9 % SOLUTION: Performed by: SURGERY

## 2025-06-05 PROCEDURE — 25010000002 PROPOFOL 1000 MG/100ML EMULSION: Performed by: NURSE ANESTHETIST, CERTIFIED REGISTERED

## 2025-06-05 RX ORDER — SODIUM CHLORIDE 0.9 % (FLUSH) 0.9 %
10 SYRINGE (ML) INJECTION AS NEEDED
Status: DISCONTINUED | OUTPATIENT
Start: 2025-06-05 | End: 2025-06-05 | Stop reason: HOSPADM

## 2025-06-05 RX ORDER — LIDOCAINE HYDROCHLORIDE 20 MG/ML
INJECTION, SOLUTION INFILTRATION; PERINEURAL AS NEEDED
Status: DISCONTINUED | OUTPATIENT
Start: 2025-06-05 | End: 2025-06-05 | Stop reason: SURG

## 2025-06-05 RX ORDER — GLYCOPYRROLATE 0.2 MG/ML
INJECTION INTRAMUSCULAR; INTRAVENOUS AS NEEDED
Status: DISCONTINUED | OUTPATIENT
Start: 2025-06-05 | End: 2025-06-05 | Stop reason: SURG

## 2025-06-05 RX ORDER — SODIUM CHLORIDE 9 MG/ML
1000 INJECTION, SOLUTION INTRAVENOUS CONTINUOUS
Status: DISCONTINUED | OUTPATIENT
Start: 2025-06-05 | End: 2025-06-05 | Stop reason: HOSPADM

## 2025-06-05 RX ORDER — PROPOFOL 10 MG/ML
INJECTION, EMULSION INTRAVENOUS AS NEEDED
Status: DISCONTINUED | OUTPATIENT
Start: 2025-06-05 | End: 2025-06-05 | Stop reason: SURG

## 2025-06-05 RX ADMIN — PROPOFOL INJECTABLE EMULSION 70 MG: 10 INJECTION, EMULSION INTRAVENOUS at 10:22

## 2025-06-05 RX ADMIN — PROPOFOL INJECTABLE EMULSION 140 MCG/KG/MIN: 10 INJECTION, EMULSION INTRAVENOUS at 10:23

## 2025-06-05 RX ADMIN — SODIUM CHLORIDE 1000 ML: 9 INJECTION, SOLUTION INTRAVENOUS at 09:48

## 2025-06-05 RX ADMIN — LIDOCAINE HYDROCHLORIDE 60 MG: 20 INJECTION, SOLUTION INFILTRATION; PERINEURAL at 10:22

## 2025-06-05 RX ADMIN — GLYCOPYRROLATE 0.2 MG: 0.2 INJECTION INTRAMUSCULAR; INTRAVENOUS at 10:22

## 2025-06-05 NOTE — ANESTHESIA POSTPROCEDURE EVALUATION
"Patient: Ro Connor    Procedure Summary       Date: 06/05/25 [facility] / [facility] / [facility] ENDOSCOPY    Anesthesia Start: 1013 Anesthesia Stop: 1046    Procedure: COLONOSCOPY to the cecum Diagnosis:       Screening for colon cancer      (Screening for colon cancer [Z12.11])    Surgeons: Vito Mesa MD Provider: Dylan Thornton MD    Anesthesia Type: MAC ASA Status: 3            Anesthesia Type: MAC    Vitals  Vitals Value Taken Time   /81 06/05/25 11:01   Temp     Pulse 84 06/05/25 11:02   Resp 19 06/05/25 11:01   SpO2 100 % 06/05/25 11:02   Vitals shown include unfiled device data.        Post Anesthesia Care and Evaluation    Level of consciousness: awake and alert  Pain management: adequate  Anesthetic complications: No anesthetic complications    Cardiovascular status: acceptable  Respiratory status: acceptable  Hydration status: acceptable    Comments: /81 (BP Location: Left arm, Patient Position: Lying)   Pulse 84   Resp 19   Ht 149.9 cm (59\")   Wt 88.9 kg (196 lb)   SpO2 100%   BMI 39.59 kg/m²       "

## 2025-06-05 NOTE — DISCHARGE INSTRUCTIONS
For the next 24 hours patient needs to be with a responsible adult.    For 24 hours DO NOT drive, operate machinery, appliances, drink alcohol, make important decisions or sign legal documents.    Start with a light or bland diet if you are feeling sick to your stomach otherwise advance to regular diet as tolerated.    Follow recommendations on procedure report if provided by your doctor.    Call Dr Mesa for problems [phone]    Problems may include but not limited to: large amounts of bleeding, trouble breathing, repeated vomiting, severe unrelieved pain, fever or chills.       neck pain

## 2025-06-05 NOTE — ANESTHESIA PREPROCEDURE EVALUATION
Anesthesia Evaluation     no history of anesthetic complications:   NPO Solid Status: > 8 hours  NPO Liquid Status: > 2 hours           Airway   Mallampati: II  TM distance: >3 FB  Neck ROM: full  Large neck circumference  Dental - normal exam     Pulmonary - normal exam   (+) ,sleep apnea on CPAP  (-) not a smoker  Cardiovascular   Exercise tolerance: poor (<4 METS)    ECG reviewed  Rhythm: regular    (+) hypertension, hyperlipidemia      Neuro/Psych  (+) psychiatric history  GI/Hepatic/Renal/Endo    (+) obesity, hiatal hernia, GERD, liver disease, renal disease- CRI, thyroid problem hyperthyroidism and thyroid nodules  (-) morbid obesity    ROS Comment: Stage 4 CRI due to NSAIDS    Musculoskeletal     (+) arthralgias, chronic pain  Abdominal    Substance History      OB/GYN          Other                          Anesthesia Plan    ASA 3     MAC     (  )    Anesthetic plan, risks, benefits, and alternatives have been provided, discussed and informed consent has been obtained with: patient.

## 2025-06-06 ENCOUNTER — OFFICE VISIT (OUTPATIENT)
Dept: INTERNAL MEDICINE | Facility: CLINIC | Age: 70
End: 2025-06-06
Payer: MEDICARE

## 2025-06-06 VITALS
HEIGHT: 59 IN | OXYGEN SATURATION: 97 % | HEART RATE: 60 BPM | TEMPERATURE: 97.4 F | BODY MASS INDEX: 39.99 KG/M2 | RESPIRATION RATE: 16 BRPM | WEIGHT: 198.38 LBS | SYSTOLIC BLOOD PRESSURE: 122 MMHG | DIASTOLIC BLOOD PRESSURE: 74 MMHG

## 2025-06-06 DIAGNOSIS — G47.33 OSA (OBSTRUCTIVE SLEEP APNEA): ICD-10-CM

## 2025-06-06 DIAGNOSIS — E61.1 IRON DEFICIENCY: ICD-10-CM

## 2025-06-06 DIAGNOSIS — R53.81 DEBILITY: ICD-10-CM

## 2025-06-06 DIAGNOSIS — G47.00 INSOMNIA, UNSPECIFIED TYPE: ICD-10-CM

## 2025-06-06 DIAGNOSIS — E04.1 THYROID NODULE: ICD-10-CM

## 2025-06-06 DIAGNOSIS — Z74.09 LIMITED MOBILITY: ICD-10-CM

## 2025-06-06 DIAGNOSIS — E78.5 HYPERLIPIDEMIA, UNSPECIFIED HYPERLIPIDEMIA TYPE: ICD-10-CM

## 2025-06-06 DIAGNOSIS — R29.898 WEAKNESS OF BOTH LEGS: ICD-10-CM

## 2025-06-06 DIAGNOSIS — M81.0 OSTEOPOROSIS WITHOUT CURRENT PATHOLOGICAL FRACTURE, UNSPECIFIED OSTEOPOROSIS TYPE: ICD-10-CM

## 2025-06-06 DIAGNOSIS — N18.4 STAGE 4 CHRONIC KIDNEY DISEASE: ICD-10-CM

## 2025-06-06 DIAGNOSIS — I10 ESSENTIAL HYPERTENSION: ICD-10-CM

## 2025-06-06 DIAGNOSIS — M54.50 CHRONIC BILATERAL LOW BACK PAIN WITHOUT SCIATICA: ICD-10-CM

## 2025-06-06 DIAGNOSIS — E05.90 HYPERTHYROIDISM: ICD-10-CM

## 2025-06-06 DIAGNOSIS — G89.29 CHRONIC BILATERAL LOW BACK PAIN WITHOUT SCIATICA: ICD-10-CM

## 2025-06-06 DIAGNOSIS — K21.9 GASTROESOPHAGEAL REFLUX DISEASE WITHOUT ESOPHAGITIS: ICD-10-CM

## 2025-06-06 DIAGNOSIS — Z00.00 MEDICARE ANNUAL WELLNESS VISIT, SUBSEQUENT: Primary | ICD-10-CM

## 2025-06-06 DIAGNOSIS — E55.9 VITAMIN D DEFICIENCY: ICD-10-CM

## 2025-06-06 NOTE — ASSESSMENT & PLAN NOTE
Well controlled, continue carvedilol. She should monitor blood pressure at home and call or return to clinic with consistent elevations greater than 130/80. Labs ordered.      Orders:    CBC w AUTO Differential; Future    Comprehensive metabolic panel; Future

## 2025-06-06 NOTE — ASSESSMENT & PLAN NOTE
Continue vitamin D supplement, vitamin D level with labs.   Orders:    Vitamin D 25 hydroxy; Future

## 2025-06-06 NOTE — ASSESSMENT & PLAN NOTE
Basic labs in clinic today. Encouraged routine dental and eye exams. Discussed age appropriate immunizations, screenings. Age appropriate handout provided, including information on nutrition and physical activity.

## 2025-06-06 NOTE — PROGRESS NOTES
" Subjective   The ABCs of the Annual Wellness Visit  Medicare Wellness Visit      Ro Connor is a 69 y.o. patient who presents for a Medicare Wellness Visit.    The following portions of the patient's history were reviewed and   updated as appropriate: allergies, current medications, past family history, past medical history, past social history, past surgical history, and problem list.    Compared to one year ago, the patient's physical   health is worse.  Compared to one year ago, the patient's mental   health is the same.    Recent Hospitalizations:  She was not admitted to the hospital during the last year.     Current Medical Providers:  Patient Care Team:  Maria Elena Mullins APRN as PCP - General (Nurse Practitioner)  John Baum MD as Consulting Physician (Nephrology)    Outpatient Medications Prior to Visit   Medication Sig Dispense Refill    Ascorbic Acid (VITAMIN C PO) Take 1,000 mg by mouth Every Night.      calcium carbonate (TUMS) 500 MG chewable tablet Chew 1 tablet 3 (Three) Times a Day.      carvedilol (COREG) 12.5 MG tablet Take 1 tablet by mouth 2 (Two) Times a Day With Meals. 180 tablet 0    cetirizine (zyrTEC) 10 MG tablet Take 1 tablet by mouth Daily. 90 tablet 1    cholecalciferol 25 MCG (1000 UT) tablet Take 1 tablet by mouth Daily.      cyclobenzaprine (FLEXERIL) 10 MG tablet Take 1 tablet by mouth 3 (Three) Times a Day As Needed for Muscle Spasms. 180 tablet 1    ferrous sulfate (SV Iron) 325 (65 FE) MG tablet Take 1 tablet by mouth Every Other Day. 45 tablet 1    furosemide (LASIX) 40 MG tablet Take 1 tablet by mouth Daily.      HYDROcodone-acetaminophen (NORCO) 7.5-325 MG per tablet Take 1 tablet by mouth 3 (Three) Times a Day. CHRONIC \"ALL OVER PAIN\"      hydrOXYzine (ATARAX) 50 MG tablet Take 1 tablet by mouth 3 (Three) Times a Day As Needed for Itching. (Patient taking differently: Take 1 tablet by mouth 2 (Two) Times a Day. ITCHING) 270 tablet 1    Melatonin 10 MG capsule " Take 1 capsule by mouth Every Night.      pantoprazole (Protonix) 40 MG EC tablet Take 1 tablet by mouth Daily. 90 tablet 1    Pediatric Multivitamins-Iron (Flintstones Complete) 18 MG chewable tablet chewable tablet Chew 1 tablet Daily.      Probiotic Product (FORTIFY PROBIOTIC WOMENS PO) Take 1 capsule by mouth Daily.      sennosides-docusate (senna-docusate sodium) 8.6-50 MG per tablet Take 1 tablet by mouth Daily. 90 tablet 2    simvastatin (ZOCOR) 40 MG tablet Take 1 tablet by mouth Every Night. 90 tablet 1    traZODone (DESYREL) 100 MG tablet Take 1 tablet by mouth At Night As Needed for Sleep. 90 tablet 1     No facility-administered medications prior to visit.     Opioid medication/s are on active medication list.  and I have evaluated her active treatment plan and pain score trends (see table).  Vitals:    06/06/25 1403   PainSc: 4    PainLoc: Back  Comment: back, hip, knees and shoulder     I have reviewed the chart for potential of high risk medication and harmful drug interactions in the elderly.        Aspirin is not on active medication list.  Aspirin use is not indicated based on review of current medical condition/s. Risk of harm outweighs potential benefits.  .    Patient Active Problem List   Diagnosis    Chronic fatigue    Ankle swelling    Essential hypertension    Hyperlipidemia    SEVEN (obstructive sleep apnea)    Gastroesophageal reflux disease    Multiple joint pain    Anxiety    Stage 4 chronic kidney disease    Other kyphoscoliosis and scoliosis    Vitamin D deficiency    Secondary hyperparathyroidism    Dietary counseling    S/P laparoscopic sleeve gastrectomy    Obesity, Class I, BMI 30-34.9    Major depressive disorder, recurrent, mild    Iron deficiency    History of COVID-19    Hyperthyroidism    Insomnia    Urinary frequency    Anemia    Backache    Bariatric surgery status    Kidney disease    Osteoarthrosis, unspecified whether generalized or localized, unspecified site    Thyroid  "nodule    Postmenopausal    Medicare annual wellness visit, subsequent    Shortness of breath    Hot flashes    Tremor    CKD (chronic kidney disease) stage 4, GFR 15-29 ml/min    CKD (chronic kidney disease) stage 5, GFR less than 15 ml/min    Colon polyps    Osteoporosis without current pathological fracture     Advance Care Planning Advance Directive is not on file.  ACP discussion was held with the patient during this visit. Patient does not have an advance directive, information provided.            Objective   Vitals:    25 1403   BP: 122/74   BP Location: Left arm   Patient Position: Sitting   Cuff Size: Adult   Pulse: 60   Resp: 16   Temp: 97.4 °F (36.3 °C)   TempSrc: Temporal   SpO2: 97%   Weight: 90 kg (198 lb 6 oz)   Height: 149.9 cm (59\")   PainSc: 4    PainLoc: Back  Comment: back, hip, knees and shoulder       Estimated body mass index is 40.07 kg/m² as calculated from the following:    Height as of this encounter: 149.9 cm (59\").    Weight as of this encounter: 90 kg (198 lb 6 oz).                Does the patient have evidence of cognitive impairment? No                                                                                                Health  Risk Assessment    Smoking Status:  Social History     Tobacco Use   Smoking Status Never    Passive exposure: Never   Smokeless Tobacco Never     Alcohol Consumption:  Social History     Substance and Sexual Activity   Alcohol Use Yes    Comment: HOLIDAYS AND VACATION       Fall Risk Screen  STEADI Fall Risk Assessment was completed, and patient is at MODERATE risk for falls. Assessment completed on:2025    Depression Screening   Little interest or pleasure in doing things? Not at all   Feeling down, depressed, or hopeless? Not at all   PHQ-2 Total Score 0      Health Habits and Functional and Cognitive Screenin/6/2025     2:00 PM   Functional & Cognitive Status   Do you have difficulty preparing food and eating? No   Do you have " difficulty bathing yourself, getting dressed or grooming yourself? No   Do you have difficulty using the toilet? No   Do you have difficulty moving around from place to place? Yes   Do you have trouble with steps or getting out of a bed or a chair? Yes   Current Diet Well Balanced Diet   Dental Exam Not up to date   Eye Exam Up to date   Exercise (times per week) 0 times per week   Current Exercises Include No Regular Exercise   Do you need help using the phone?  No   Are you deaf or do you have serious difficulty hearing?  Yes   Do you need help to go to places out of walking distance? No   Do you need help shopping? Yes   Do you need help preparing meals?  No   Do you need help with housework?  Yes   Do you need help with laundry? Yes   Do you need help taking your medications? No   Do you need help managing money? No   Do you ever drive or ride in a car without wearing a seat belt? No   Have you felt unusual stress, anger or loneliness in the last month? No   Who do you live with? Spouse   If you need help, do you have trouble finding someone available to you? No   Do you have difficulty concentrating, remembering or making decisions? Yes           Age-appropriate Screening Schedule:  Refer to the list below for future screening recommendations based on patient's age, sex and/or medical conditions. Orders for these recommended tests are listed in the plan section. The patient has been provided with a written plan.    Health Maintenance List  Health Maintenance   Topic Date Due    ZOSTER VACCINE (1 of 2) Never done    COVID-19 Vaccine (5 - 2024-25 season) 12/06/2025 (Originally 5/26/2025)    INFLUENZA VACCINE  07/01/2025    LIPID PANEL  12/10/2025    ANNUAL WELLNESS VISIT  06/06/2026    MAMMOGRAM  03/31/2027    DXA SCAN  03/31/2027    TDAP/TD VACCINES (2 - Td or Tdap) 12/29/2027    COLORECTAL CANCER SCREENING  06/05/2035    HEPATITIS C SCREENING  Completed    Pneumococcal Vaccine 50+  Completed    Hepatitis B   Aged Out                                                                                                                                                CMS Preventative Services Quick Reference  Risk Factors Identified During Encounter  Immunizations Discussed/Encouraged: Prevnar 20 (Pneumococcal 20-valent conjugate), Shingrix, and COVID19    The above risks/problems have been discussed with the patient.  Pertinent information has been shared with the patient in the After Visit Summary.  An After Visit Summary and PPPS were made available to the patient.    Follow Up:   Next Medicare Wellness visit to be scheduled in 1 year.         Additional E&M Note during same encounter follows:  Patient has additional, significant, and separately identifiable condition(s)/problem(s) that require work above and beyond the Medicare Wellness Visit     Chief Complaint  Follow-up (4 month,discuss osteoporosis  ), Hypertension, and Medicare Wellness-subsequent    Subjective   HPI  Ro is also being seen today for additional medical problem/s.            CKD-  Managed by nephrology.  Went on low phosphorus diet due to decreased GFR and increased phosphorus level, did improve with dietary changes. Will continue to monitor every three to four months. Had colonoscopy, will now be ready for peritoneal dialysis procedure when ready. Had fistula placed early this year.      GERD-  Doing well with pantoprazole.      Hyperthyroid-  Thyroid ultrasound 3/2025, continue surveillance x 5 years per radiologist recommendation.     Osteoporosis-  Noted on recent DEXA. Patient would like to discuss medication options.      Depression/insomnia-  Continues trazodone as needed for sleep. Will alternate with Tylenol PM and melatonin. Does not want to adjust medications at this time.      HTN-  Continues Coreg, never started amlodipine. States she was started on hydralazine by nephrology, took one time but never needed again. Is monitoring blood  "pressure twice daily, sometimes will only need 1/2 Coreg tablet.     Iron deficiency-  Iron supplement every other day.  Elevated ferritin, monitored by nephrology.     Vitamin D deficiency-  Continues oral supplement.     HLD-  Continues statin.      SEVEN-  Managed with CPAP.     Chronic pain-  Continues to follow with pain management for Norco. Had Intracept procedure, states the first month her pain was worse but since that time she has had more relief.      Mammogram: 3/2025  DEXA: 3/2025  Colonoscopy: 2025  Pneumonia vaccination: Up to date  Shingles vaccination: Will consider at pharmacy    Objective   Vital Signs:  /74 (BP Location: Left arm, Patient Position: Sitting, Cuff Size: Adult)   Pulse 60   Temp 97.4 °F (36.3 °C) (Temporal)   Resp 16   Ht 149.9 cm (59\")   Wt 90 kg (198 lb 6 oz)   SpO2 97%   BMI 40.07 kg/m²   Physical Exam  Constitutional:       Appearance: Normal appearance.   HENT:      Head: Normocephalic and atraumatic.      Nose: Nose normal.      Mouth/Throat:      Mouth: Mucous membranes are moist.      Pharynx: Oropharynx is clear.   Eyes:      Extraocular Movements: Extraocular movements intact.      Conjunctiva/sclera: Conjunctivae normal.      Pupils: Pupils are equal, round, and reactive to light.   Neck:      Thyroid: No thyroid mass, thyromegaly or thyroid tenderness.   Cardiovascular:      Rate and Rhythm: Normal rate and regular rhythm.      Heart sounds: Normal heart sounds.   Pulmonary:      Effort: Pulmonary effort is normal.      Breath sounds: Normal breath sounds.   Skin:     General: Skin is warm and dry.   Neurological:      General: No focal deficit present.      Mental Status: She is alert and oriented to person, place, and time.   Psychiatric:         Mood and Affect: Mood normal.         Behavior: Behavior normal.         Thought Content: Thought content normal.                    Assessment and Plan      Medicare annual wellness visit, subsequent  Basic labs in " clinic today. Encouraged routine dental and eye exams. Discussed age appropriate immunizations, screenings. Age appropriate handout provided, including information on nutrition and physical activity.       Stage 4 chronic kidney disease  She will continue to follow with nephrology as scheduled. CMP with labs.         Debility  Patient would like to discuss resources with , predominantly a ramp. Will place referral.        Weakness of both legs         Chronic bilateral low back pain without sciatica  Continue to follow with pain management for Norco. Will continue to monitor.        Limited mobility         Essential hypertension  Well controlled, continue carvedilol. She should monitor blood pressure at home and call or return to clinic with consistent elevations greater than 130/80. Labs ordered.      Orders:    CBC w AUTO Differential; Future    Comprehensive metabolic panel; Future    Hyperlipidemia, unspecified hyperlipidemia type   Continue statin, lipid panel ordered.     Orders:    Lipid panel; Future    Vitamin D deficiency  Continue vitamin D supplement, vitamin D level with labs.   Orders:    Vitamin D 25 hydroxy; Future    Hyperthyroidism  Thyroid labs ordered.  Orders:    TSH; Future    T4, free; Future    Thyroid nodule  Repeat ultrasound as recommended in 2026.        Gastroesophageal reflux disease without esophagitis  Well controlled, continue PPI.        Insomnia, unspecified type  Continue trazodone.        Iron deficiency  Continue iron supplement. Nephrology to monitor labs.       Osteoporosis without current pathological fracture, unspecified osteoporosis type  Patient to discuss Prolia with nephrology.        SEVEN (obstructive sleep apnea)  Continue CPAP.                Follow Up   Return in about 6 months (around 12/6/2025).  Patient was given instructions and counseling regarding her condition or for health maintenance advice. Please see specific information pulled into the AVS  if appropriate.

## 2025-06-17 ENCOUNTER — TELEPHONE (OUTPATIENT)
Dept: INTERNAL MEDICINE | Facility: CLINIC | Age: 70
End: 2025-06-17
Payer: MEDICARE

## 2025-06-17 NOTE — TELEPHONE ENCOUNTER
Caller: Ro Connor    Relationship to patient: Self    Best call back number: [phone]    Patient is needing: Patient called in and is requesting a call back to discuss Prolia. Patient said she had spoken with renal specialist and specialist had stated it was okay for patient to have Prolia but she would need to be closely monitored through labs. Patient would like to have a call back please.

## 2025-06-19 DIAGNOSIS — M81.6 LOCALIZED OSTEOPOROSIS WITHOUT CURRENT PATHOLOGICAL FRACTURE: Primary | ICD-10-CM

## 2025-06-19 NOTE — TELEPHONE ENCOUNTER
Prolia has been ordered, they should call her to schedule. They will complete labs prior to giving the medication.

## 2025-06-19 NOTE — TELEPHONE ENCOUNTER
Spoke with patient and she stated kidney provider told her that would be the best option for patient but that pcp would need to monitor with labs, please advise

## 2025-07-08 ENCOUNTER — LAB (OUTPATIENT)
Facility: HOSPITAL | Age: 70
End: 2025-07-08
Payer: MEDICARE

## 2025-07-08 DIAGNOSIS — I10 ESSENTIAL HYPERTENSION: ICD-10-CM

## 2025-07-08 DIAGNOSIS — M81.6 LOCALIZED OSTEOPOROSIS WITHOUT CURRENT PATHOLOGICAL FRACTURE: ICD-10-CM

## 2025-07-08 DIAGNOSIS — E05.90 HYPERTHYROIDISM: ICD-10-CM

## 2025-07-08 DIAGNOSIS — E78.5 HYPERLIPIDEMIA, UNSPECIFIED HYPERLIPIDEMIA TYPE: ICD-10-CM

## 2025-07-08 DIAGNOSIS — E55.9 VITAMIN D DEFICIENCY: ICD-10-CM

## 2025-07-08 LAB
25(OH)D3 SERPL-MCNC: 76 NG/ML (ref 30–100)
ALBUMIN SERPL-MCNC: 4.4 G/DL (ref 3.5–5.2)
ALBUMIN/GLOB SERPL: 1.6 G/DL
ALP SERPL-CCNC: 103 U/L (ref 39–117)
ALT SERPL W P-5'-P-CCNC: 16 U/L (ref 1–33)
ANION GAP SERPL CALCULATED.3IONS-SCNC: 12.9 MMOL/L (ref 5–15)
AST SERPL-CCNC: 20 U/L (ref 1–32)
BASOPHILS # BLD AUTO: 0.07 10*3/MM3 (ref 0–0.2)
BASOPHILS NFR BLD AUTO: 0.7 % (ref 0–1.5)
BILIRUB SERPL-MCNC: 0.5 MG/DL (ref 0–1.2)
BUN SERPL-MCNC: 39.3 MG/DL (ref 8–23)
BUN/CREAT SERPL: 12.9 (ref 7–25)
CALCIUM SPEC-SCNC: 9.8 MG/DL (ref 8.6–10.5)
CHLORIDE SERPL-SCNC: 104 MMOL/L (ref 98–107)
CHOLEST SERPL-MCNC: 141 MG/DL (ref 0–200)
CO2 SERPL-SCNC: 22.1 MMOL/L (ref 22–29)
CREAT SERPL-MCNC: 3.04 MG/DL (ref 0.57–1)
DEPRECATED RDW RBC AUTO: 45 FL (ref 37–54)
EGFRCR SERPLBLD CKD-EPI 2021: 16.1 ML/MIN/1.73
EOSINOPHIL # BLD AUTO: 0.39 10*3/MM3 (ref 0–0.4)
EOSINOPHIL NFR BLD AUTO: 4.1 % (ref 0.3–6.2)
ERYTHROCYTE [DISTWIDTH] IN BLOOD BY AUTOMATED COUNT: 13.2 % (ref 12.3–15.4)
GLOBULIN UR ELPH-MCNC: 2.8 GM/DL
GLUCOSE SERPL-MCNC: 97 MG/DL (ref 65–99)
HCT VFR BLD AUTO: 31.3 % (ref 34–46.6)
HDLC SERPL-MCNC: 67 MG/DL (ref 40–60)
HGB BLD-MCNC: 10.3 G/DL (ref 12–15.9)
IMM GRANULOCYTES # BLD AUTO: 0.03 10*3/MM3 (ref 0–0.05)
IMM GRANULOCYTES NFR BLD AUTO: 0.3 % (ref 0–0.5)
LDLC SERPL CALC-MCNC: 64 MG/DL (ref 0–100)
LDLC/HDLC SERPL: 0.98 {RATIO}
LYMPHOCYTES # BLD AUTO: 2.76 10*3/MM3 (ref 0.7–3.1)
LYMPHOCYTES NFR BLD AUTO: 29.2 % (ref 19.6–45.3)
MAGNESIUM SERPL-MCNC: 2.3 MG/DL (ref 1.6–2.4)
MCH RBC QN AUTO: 30.9 PG (ref 26.6–33)
MCHC RBC AUTO-ENTMCNC: 32.9 G/DL (ref 31.5–35.7)
MCV RBC AUTO: 94 FL (ref 79–97)
MONOCYTES # BLD AUTO: 0.7 10*3/MM3 (ref 0.1–0.9)
MONOCYTES NFR BLD AUTO: 7.4 % (ref 5–12)
NEUTROPHILS NFR BLD AUTO: 5.49 10*3/MM3 (ref 1.7–7)
NEUTROPHILS NFR BLD AUTO: 58.3 % (ref 42.7–76)
NRBC BLD AUTO-RTO: 0 /100 WBC (ref 0–0.2)
PHOSPHATE SERPL-MCNC: 3.8 MG/DL (ref 2.5–4.5)
PLATELET # BLD AUTO: 203 10*3/MM3 (ref 140–450)
PMV BLD AUTO: 11.6 FL (ref 6–12)
POTASSIUM SERPL-SCNC: 4.4 MMOL/L (ref 3.5–5.2)
PROT SERPL-MCNC: 7.2 G/DL (ref 6–8.5)
RBC # BLD AUTO: 3.33 10*6/MM3 (ref 3.77–5.28)
SODIUM SERPL-SCNC: 139 MMOL/L (ref 136–145)
T4 FREE SERPL-MCNC: 1.11 NG/DL (ref 0.92–1.68)
TRIGL SERPL-MCNC: 41 MG/DL (ref 0–150)
TSH SERPL DL<=0.05 MIU/L-ACNC: 1.13 UIU/ML (ref 0.27–4.2)
VLDLC SERPL-MCNC: 10 MG/DL (ref 5–40)
WBC NRBC COR # BLD AUTO: 9.44 10*3/MM3 (ref 3.4–10.8)

## 2025-07-08 PROCEDURE — 80053 COMPREHEN METABOLIC PANEL: CPT

## 2025-07-08 PROCEDURE — 80061 LIPID PANEL: CPT

## 2025-07-08 PROCEDURE — 84100 ASSAY OF PHOSPHORUS: CPT

## 2025-07-08 PROCEDURE — 85025 COMPLETE CBC W/AUTO DIFF WBC: CPT

## 2025-07-08 PROCEDURE — 84439 ASSAY OF FREE THYROXINE: CPT

## 2025-07-08 PROCEDURE — 84443 ASSAY THYROID STIM HORMONE: CPT

## 2025-07-08 PROCEDURE — 83735 ASSAY OF MAGNESIUM: CPT

## 2025-07-08 PROCEDURE — 36415 COLL VENOUS BLD VENIPUNCTURE: CPT

## 2025-07-08 PROCEDURE — 82306 VITAMIN D 25 HYDROXY: CPT

## 2025-07-11 ENCOUNTER — HOSPITAL ENCOUNTER (OUTPATIENT)
Dept: INFUSION THERAPY | Facility: HOSPITAL | Age: 70
Discharge: HOME | End: 2025-07-11
Payer: MEDICARE

## 2025-07-11 VITALS
HEART RATE: 79 BPM | RESPIRATION RATE: 20 BRPM | HEIGHT: 60 IN | OXYGEN SATURATION: 96 % | TEMPERATURE: 98.1 F | WEIGHT: 195.55 LBS | BODY MASS INDEX: 38.39 KG/M2 | DIASTOLIC BLOOD PRESSURE: 58 MMHG | SYSTOLIC BLOOD PRESSURE: 148 MMHG

## 2025-07-11 DIAGNOSIS — M81.6 LOCALIZED OSTEOPOROSIS WITHOUT CURRENT PATHOLOGICAL FRACTURE: Primary | ICD-10-CM

## 2025-07-11 PROCEDURE — 96372 THER/PROPH/DIAG INJ SC/IM: CPT

## 2025-07-11 PROCEDURE — 25010000002 DENOSUMAB 60 MG/ML SOLUTION PREFILLED SYRINGE: Performed by: NURSE PRACTITIONER

## 2025-07-11 RX ADMIN — DENOSUMAB 60 MG: 60 INJECTION SUBCUTANEOUS at 13:37

## 2025-07-11 NOTE — CODE DOCUMENTATION
Patient presented to department for Prolia injection, labs indicated that CrCl was 17.3.   1130- Contacted MAYUR Nelson to notify of lab levels, per her instructions, I was to contact [facility].   A call was placed to John Baum MD, a voice mail was left.   1230 a return call was received, I was notified that Dr. Baum was out of the office today, and per his APRNCHIOMA, it was okay to proceed with the prolia injection on this patient. ..Julisa Villarreal RN

## 2025-07-20 DIAGNOSIS — N18.4 STAGE 4 CHRONIC KIDNEY DISEASE: ICD-10-CM

## 2025-07-20 DIAGNOSIS — I10 ESSENTIAL HYPERTENSION: ICD-10-CM

## 2025-07-21 RX ORDER — CARVEDILOL 12.5 MG/1
12.5 TABLET ORAL 2 TIMES DAILY WITH MEALS
Qty: 180 TABLET | Refills: 0 | Status: SHIPPED | OUTPATIENT
Start: 2025-07-21

## 2025-07-22 RX ORDER — HYDROXYZINE HYDROCHLORIDE 50 MG/1
50 TABLET, FILM COATED ORAL 3 TIMES DAILY PRN
Qty: 270 TABLET | Refills: 0 | Status: SHIPPED | OUTPATIENT
Start: 2025-07-22

## 2025-07-28 ENCOUNTER — LAB (OUTPATIENT)
Facility: HOSPITAL | Age: 70
End: 2025-07-28
Payer: MEDICARE

## 2025-07-28 ENCOUNTER — TRANSCRIBE ORDERS (OUTPATIENT)
Facility: HOSPITAL | Age: 70
End: 2025-07-28
Payer: MEDICARE

## 2025-07-28 DIAGNOSIS — N18.4 CHRONIC KIDNEY DISEASE, STAGE IV (SEVERE): ICD-10-CM

## 2025-07-28 DIAGNOSIS — N18.4 CHRONIC KIDNEY DISEASE, STAGE IV (SEVERE): Primary | ICD-10-CM

## 2025-07-28 DIAGNOSIS — M81.6 LOCALIZED OSTEOPOROSIS WITHOUT CURRENT PATHOLOGICAL FRACTURE: ICD-10-CM

## 2025-07-28 DIAGNOSIS — D50.9 IRON DEFICIENCY ANEMIA, UNSPECIFIED IRON DEFICIENCY ANEMIA TYPE: ICD-10-CM

## 2025-07-28 LAB
BACTERIA UR QL AUTO: ABNORMAL /HPF
BASOPHILS # BLD AUTO: 0.07 10*3/MM3 (ref 0–0.2)
BASOPHILS NFR BLD AUTO: 0.8 % (ref 0–1.5)
BILIRUB UR QL STRIP: NEGATIVE
CLARITY UR: ABNORMAL
COLOR UR: YELLOW
CREAT UR-MCNC: 37 MG/DL
DEPRECATED RDW RBC AUTO: 45.3 FL (ref 37–54)
EOSINOPHIL # BLD AUTO: 0.3 10*3/MM3 (ref 0–0.4)
EOSINOPHIL NFR BLD AUTO: 3.6 % (ref 0.3–6.2)
ERYTHROCYTE [DISTWIDTH] IN BLOOD BY AUTOMATED COUNT: 13 % (ref 12.3–15.4)
FERRITIN SERPL-MCNC: 543 NG/ML (ref 13–150)
FERRITIN SERPL-MCNC: 543 NG/ML (ref 13–150)
GLUCOSE UR STRIP-MCNC: NEGATIVE MG/DL
HCT VFR BLD AUTO: 28.7 % (ref 34–46.6)
HGB BLD-MCNC: 9.3 G/DL (ref 12–15.9)
HGB UR QL STRIP.AUTO: ABNORMAL
HYALINE CASTS UR QL AUTO: ABNORMAL /LPF
IMM GRANULOCYTES # BLD AUTO: 0.03 10*3/MM3 (ref 0–0.05)
IMM GRANULOCYTES NFR BLD AUTO: 0.4 % (ref 0–0.5)
IRON 24H UR-MRATE: 34 MCG/DL (ref 37–145)
IRON SATN MFR SERPL: 14 % (ref 20–50)
KETONES UR QL STRIP: NEGATIVE
LEUKOCYTE ESTERASE UR QL STRIP.AUTO: ABNORMAL
LYMPHOCYTES # BLD AUTO: 2.15 10*3/MM3 (ref 0.7–3.1)
LYMPHOCYTES NFR BLD AUTO: 25.7 % (ref 19.6–45.3)
MCH RBC QN AUTO: 31 PG (ref 26.6–33)
MCHC RBC AUTO-ENTMCNC: 32.4 G/DL (ref 31.5–35.7)
MCV RBC AUTO: 95.7 FL (ref 79–97)
MONOCYTES # BLD AUTO: 0.7 10*3/MM3 (ref 0.1–0.9)
MONOCYTES NFR BLD AUTO: 8.4 % (ref 5–12)
NEUTROPHILS NFR BLD AUTO: 5.12 10*3/MM3 (ref 1.7–7)
NEUTROPHILS NFR BLD AUTO: 61.1 % (ref 42.7–76)
NITRITE UR QL STRIP: NEGATIVE
NRBC BLD AUTO-RTO: 0 /100 WBC (ref 0–0.2)
PH UR STRIP.AUTO: 6.5 [PH] (ref 5–8)
PHOSPHATE SERPL-MCNC: 2.5 MG/DL (ref 2.5–4.5)
PLATELET # BLD AUTO: 195 10*3/MM3 (ref 140–450)
PMV BLD AUTO: 11.3 FL (ref 6–12)
PROT ?TM UR-MCNC: 9.1 MG/DL
PROT UR QL STRIP: NEGATIVE
PROT/CREAT UR: 0.25 MG/G{CREAT}
RBC # BLD AUTO: 3 10*6/MM3 (ref 3.77–5.28)
RBC # UR STRIP: ABNORMAL /HPF
REF LAB TEST METHOD: ABNORMAL
SP GR UR STRIP: 1.01 (ref 1–1.03)
SQUAMOUS #/AREA URNS HPF: ABNORMAL /HPF
TIBC SERPL-MCNC: 238 MCG/DL (ref 298–536)
TRANSFERRIN SERPL-MCNC: 160 MG/DL (ref 200–360)
UROBILINOGEN UR QL STRIP: ABNORMAL
WBC # UR STRIP: ABNORMAL /HPF
WBC NRBC COR # BLD AUTO: 8.37 10*3/MM3 (ref 3.4–10.8)

## 2025-07-28 PROCEDURE — 80048 BASIC METABOLIC PNL TOTAL CA: CPT

## 2025-07-28 PROCEDURE — 82728 ASSAY OF FERRITIN: CPT

## 2025-07-28 PROCEDURE — 83970 ASSAY OF PARATHORMONE: CPT

## 2025-07-28 PROCEDURE — 84156 ASSAY OF PROTEIN URINE: CPT

## 2025-07-28 PROCEDURE — 84466 ASSAY OF TRANSFERRIN: CPT

## 2025-07-28 PROCEDURE — 81001 URINALYSIS AUTO W/SCOPE: CPT

## 2025-07-28 PROCEDURE — 82570 ASSAY OF URINE CREATININE: CPT

## 2025-07-28 PROCEDURE — 84100 ASSAY OF PHOSPHORUS: CPT

## 2025-07-28 PROCEDURE — 85025 COMPLETE CBC W/AUTO DIFF WBC: CPT

## 2025-07-28 PROCEDURE — 83540 ASSAY OF IRON: CPT

## 2025-07-28 PROCEDURE — 36415 COLL VENOUS BLD VENIPUNCTURE: CPT

## 2025-07-29 LAB
ANION GAP SERPL CALCULATED.3IONS-SCNC: 10.5 MMOL/L (ref 5–15)
BUN SERPL-MCNC: 26 MG/DL (ref 8–23)
BUN/CREAT SERPL: 10.2 (ref 7–25)
CALCIUM SPEC-SCNC: 8 MG/DL (ref 8.6–10.5)
CHLORIDE SERPL-SCNC: 110 MMOL/L (ref 98–107)
CO2 SERPL-SCNC: 19.5 MMOL/L (ref 22–29)
CREAT SERPL-MCNC: 2.56 MG/DL (ref 0.57–1)
EGFRCR SERPLBLD CKD-EPI 2021: 19.7 ML/MIN/1.73
GLUCOSE SERPL-MCNC: 88 MG/DL (ref 65–99)
POTASSIUM SERPL-SCNC: 5 MMOL/L (ref 3.5–5.2)
PTH-INTACT SERPL-MCNC: 559 PG/ML (ref 15–65)
SODIUM SERPL-SCNC: 140 MMOL/L (ref 136–145)

## 2025-08-09 DIAGNOSIS — K21.9 GASTROESOPHAGEAL REFLUX DISEASE WITHOUT ESOPHAGITIS: ICD-10-CM

## 2025-08-12 RX ORDER — PANTOPRAZOLE SODIUM 40 MG/1
40 TABLET, DELAYED RELEASE ORAL DAILY
Qty: 90 TABLET | Refills: 0 | Status: SHIPPED | OUTPATIENT
Start: 2025-08-12

## 2025-08-14 ENCOUNTER — TRANSCRIBE ORDERS (OUTPATIENT)
Dept: ADMINISTRATIVE | Facility: HOSPITAL | Age: 70
End: 2025-08-14
Payer: MEDICARE

## 2025-08-14 DIAGNOSIS — N18.9 ANEMIA OF RENAL DISEASE: Primary | ICD-10-CM

## 2025-08-14 DIAGNOSIS — N18.4 CHRONIC KIDNEY DISEASE, STAGE IV (SEVERE): ICD-10-CM

## 2025-08-14 DIAGNOSIS — D63.1 ANEMIA OF CHRONIC RENAL FAILURE, STAGE 4 (SEVERE): ICD-10-CM

## 2025-08-14 DIAGNOSIS — N18.4 ANEMIA OF CHRONIC RENAL FAILURE, STAGE 4 (SEVERE): ICD-10-CM

## 2025-08-14 DIAGNOSIS — D63.1 ANEMIA OF RENAL DISEASE: Primary | ICD-10-CM

## 2025-08-15 DIAGNOSIS — D63.1 ANEMIA IN STAGE 4 CHRONIC KIDNEY DISEASE: Primary | ICD-10-CM

## 2025-08-15 DIAGNOSIS — N18.4 ANEMIA IN STAGE 4 CHRONIC KIDNEY DISEASE: Primary | ICD-10-CM

## 2025-08-16 DIAGNOSIS — E78.5 HYPERLIPIDEMIA, UNSPECIFIED HYPERLIPIDEMIA TYPE: ICD-10-CM

## 2025-08-18 RX ORDER — SIMVASTATIN 40 MG
40 TABLET ORAL NIGHTLY
Qty: 90 TABLET | Refills: 0 | Status: SHIPPED | OUTPATIENT
Start: 2025-08-18

## 2025-08-20 ENCOUNTER — TRANSCRIBE ORDERS (OUTPATIENT)
Dept: ADMINISTRATIVE | Facility: HOSPITAL | Age: 70
End: 2025-08-20
Payer: MEDICARE

## 2025-08-20 DIAGNOSIS — D63.1 ANEMIA IN CHRONIC KIDNEY DISEASE (CODE): Primary | ICD-10-CM

## 2025-08-20 DIAGNOSIS — N18.4 CHRONIC KIDNEY DISEASE, STAGE IV (SEVERE): ICD-10-CM

## 2025-08-21 ENCOUNTER — HOSPITAL ENCOUNTER (OUTPATIENT)
Dept: INFUSION THERAPY | Facility: HOSPITAL | Age: 70
Discharge: HOME | End: 2025-08-21
Admitting: NURSE PRACTITIONER
Payer: MEDICARE

## 2025-08-21 VITALS
TEMPERATURE: 97.3 F | WEIGHT: 197.31 LBS | BODY MASS INDEX: 38.74 KG/M2 | HEART RATE: 83 BPM | SYSTOLIC BLOOD PRESSURE: 161 MMHG | OXYGEN SATURATION: 100 % | DIASTOLIC BLOOD PRESSURE: 72 MMHG | HEIGHT: 60 IN | RESPIRATION RATE: 20 BRPM

## 2025-08-21 DIAGNOSIS — N18.4 ANEMIA IN STAGE 4 CHRONIC KIDNEY DISEASE: Primary | ICD-10-CM

## 2025-08-21 DIAGNOSIS — D63.1 ANEMIA IN STAGE 4 CHRONIC KIDNEY DISEASE: Primary | ICD-10-CM

## 2025-08-21 LAB
25(OH)D3 SERPL-MCNC: 72.8 NG/ML (ref 30–100)
ALBUMIN SERPL-MCNC: 4.1 G/DL (ref 3.5–5.2)
ALBUMIN/GLOB SERPL: 1.5 G/DL
ALP SERPL-CCNC: 61 U/L (ref 39–117)
ALT SERPL W P-5'-P-CCNC: 17 U/L (ref 1–33)
ANION GAP SERPL CALCULATED.3IONS-SCNC: 11.2 MMOL/L (ref 5–15)
AST SERPL-CCNC: 19 U/L (ref 1–32)
BILIRUB SERPL-MCNC: 0.3 MG/DL (ref 0–1.2)
BUN SERPL-MCNC: 49.8 MG/DL (ref 8–23)
BUN/CREAT SERPL: 15.5 (ref 7–25)
CALCIUM SPEC-SCNC: 9 MG/DL (ref 8.6–10.5)
CALCIUM SPEC-SCNC: 9.1 MG/DL (ref 8.6–10.5)
CHLORIDE SERPL-SCNC: 103 MMOL/L (ref 98–107)
CO2 SERPL-SCNC: 20.8 MMOL/L (ref 22–29)
CREAT SERPL-MCNC: 3.21 MG/DL (ref 0.57–1)
DEPRECATED RDW RBC AUTO: 50.8 FL (ref 37–54)
EGFRCR SERPLBLD CKD-EPI 2021: 15 ML/MIN/1.73
ERYTHROCYTE [DISTWIDTH] IN BLOOD BY AUTOMATED COUNT: 14.6 % (ref 12.3–15.4)
FERRITIN SERPL-MCNC: 546.9 NG/ML (ref 13–150)
GLOBULIN UR ELPH-MCNC: 2.7 GM/DL
GLUCOSE SERPL-MCNC: 96 MG/DL (ref 65–99)
HCT VFR BLD AUTO: 31.1 % (ref 34–46.6)
HGB BLD-MCNC: 9.9 G/DL (ref 12–15.9)
IRON 24H UR-MRATE: 66 MCG/DL (ref 37–145)
IRON SATN MFR SERPL: 25 % (ref 20–50)
MCH RBC QN AUTO: 30.1 PG (ref 26.6–33)
MCHC RBC AUTO-ENTMCNC: 31.8 G/DL (ref 31.5–35.7)
MCV RBC AUTO: 94.5 FL (ref 79–97)
PHOSPHATE SERPL-MCNC: 3.4 MG/DL (ref 2.5–4.5)
PLATELET # BLD AUTO: 171 10*3/MM3 (ref 140–450)
PMV BLD AUTO: 11.2 FL (ref 6–12)
POTASSIUM SERPL-SCNC: 4.5 MMOL/L (ref 3.5–5.2)
PROT SERPL-MCNC: 6.8 G/DL (ref 6–8.5)
PTH-INTACT SERPL-MCNC: 277.7 PG/ML (ref 17.9–58.6)
RBC # BLD AUTO: 3.29 10*6/MM3 (ref 3.77–5.28)
SODIUM SERPL-SCNC: 135 MMOL/L (ref 136–145)
TIBC SERPL-MCNC: 265 MCG/DL (ref 298–536)
TRANSFERRIN SERPL-MCNC: 178 MG/DL (ref 200–360)
URATE SERPL-MCNC: 6 MG/DL (ref 2.4–5.7)
VIT B12 BLD-MCNC: 601 PG/ML (ref 211–946)
WBC NRBC COR # BLD AUTO: 10.33 10*3/MM3 (ref 3.4–10.8)

## 2025-08-21 PROCEDURE — 83970 ASSAY OF PARATHORMONE: CPT | Performed by: NURSE PRACTITIONER

## 2025-08-21 PROCEDURE — 84466 ASSAY OF TRANSFERRIN: CPT | Performed by: NURSE PRACTITIONER

## 2025-08-21 PROCEDURE — 85027 COMPLETE CBC AUTOMATED: CPT | Performed by: NURSE PRACTITIONER

## 2025-08-21 PROCEDURE — 82306 VITAMIN D 25 HYDROXY: CPT | Performed by: NURSE PRACTITIONER

## 2025-08-21 PROCEDURE — 82607 VITAMIN B-12: CPT | Performed by: NURSE PRACTITIONER

## 2025-08-21 PROCEDURE — 80053 COMPREHEN METABOLIC PANEL: CPT | Performed by: NURSE PRACTITIONER

## 2025-08-21 PROCEDURE — 84100 ASSAY OF PHOSPHORUS: CPT | Performed by: NURSE PRACTITIONER

## 2025-08-21 PROCEDURE — 25010000002 DARBEPOETIN ALFA 40 MCG/0.4ML SOLUTION PREFILLED SYRINGE: Performed by: NURSE PRACTITIONER

## 2025-08-21 PROCEDURE — 36415 COLL VENOUS BLD VENIPUNCTURE: CPT

## 2025-08-21 PROCEDURE — 83540 ASSAY OF IRON: CPT | Performed by: NURSE PRACTITIONER

## 2025-08-21 PROCEDURE — 82728 ASSAY OF FERRITIN: CPT | Performed by: NURSE PRACTITIONER

## 2025-08-21 PROCEDURE — 96372 THER/PROPH/DIAG INJ SC/IM: CPT

## 2025-08-21 PROCEDURE — 84550 ASSAY OF BLOOD/URIC ACID: CPT | Performed by: NURSE PRACTITIONER

## 2025-08-21 RX ADMIN — DARBEPOETIN ALFA 40 MCG: 40 INJECTION, SOLUTION INTRAVENOUS; SUBCUTANEOUS at 15:21

## (undated) DEVICE — GLV SURG SENSICARE PI PF LF 8.5 GRN STRL

## (undated) DEVICE — SUT SILK 2/0 TIES 18IN A185H

## (undated) DEVICE — SUT PROLN 6/0 BV1 D/A 30IN 8709H

## (undated) DEVICE — GOWN,NON-REINFORCED,SIRUS,SET IN SLV,XL: Brand: MEDLINE

## (undated) DEVICE — ENDOPATH XCEL BLADELESS TROCARS WITH STABILITY SLEEVES: Brand: ENDOPATH XCEL

## (undated) DEVICE — TRAP FLD MINIVAC MEGADYNE 100ML

## (undated) DEVICE — VIOLET BRAIDED (POLYGLACTIN 910), SYNTHETIC ABSORBABLE SUTURE: Brand: COATED VICRYL

## (undated) DEVICE — MARKR SKIN W/RULR AND LBL

## (undated) DEVICE — PK OSC LAP SLV 40

## (undated) DEVICE — SUT SILK 0 SH 30IN K834H

## (undated) DEVICE — CLMP STD 22CM DISP

## (undated) DEVICE — GLV SURG SENSICARE MICRO PF LF 8.5 STRL

## (undated) DEVICE — CANN O2 ETCO2 FITS ALL CONN CO2 SMPL A/ 7IN DISP LF

## (undated) DEVICE — ENSEAL LAPAROSCOPIC TISSUE SEALER G2 ARTICULATING CURVED JAW FOR USE WITH G2 GENERATOR 5MM DIAMETER 45CM SHAFT LENGTH: Brand: ENSEAL

## (undated) DEVICE — ECHELON FLEX POWERED PLUS LONG ARTICULATING ENDOSCOPIC LINEAR CUTTER, 60MM: Brand: ECHELON FLEX

## (undated) DEVICE — VESSEL LOOPS X-RAY DETECTABLE: Brand: DEROYAL

## (undated) DEVICE — ANTIBACTERIAL UNDYED BRAIDED (POLYGLACTIN 910), SYNTHETIC ABSORBABLE SUTURE: Brand: COATED VICRYL

## (undated) DEVICE — BANDAGE,GAUZE,BULKEE II,4.5"X4.1YD,STRL: Brand: MEDLINE

## (undated) DEVICE — GLV SURG SENSICARE PI LF PF 8 GRN STRL

## (undated) DEVICE — GLV SURG SENSICARE MICRO PF LF 7.5 STRL

## (undated) DEVICE — SOL NS 500ML

## (undated) DEVICE — KT ORCA ORCAPOD DISP STRL

## (undated) DEVICE — DUAL LUMEN STOMACH TUBE,ANTI-REFLUX VALVE: Brand: SALEM SUMP

## (undated) DEVICE — PK AV FISTL/SHNT 40

## (undated) DEVICE — SENSR O2 OXIMAX FNGR A/ 18IN NONSTR

## (undated) DEVICE — PENCL SMOKE/EVAC MEGADYNE TELESCP 10FT

## (undated) DEVICE — GLV SURG BIOGEL LTX PF 7

## (undated) DEVICE — SUT SILK 3/0 TIES 18IN A184H

## (undated) DEVICE — SYR LL TP 10ML STRL

## (undated) DEVICE — SYR LUERLOK 20CC BX/50

## (undated) DEVICE — SUT SILK 3/0 SH CR8 18IN C013D

## (undated) DEVICE — TUBING, SUCTION, 1/4" X 10', STRAIGHT: Brand: MEDLINE

## (undated) DEVICE — VISUALIZATION SYSTEM: Brand: CLEARIFY

## (undated) DEVICE — PATIENT RETURN ELECTRODE, SINGLE-USE, CONTACT QUALITY MONITORING, ADULT, WITH 9FT CORD, FOR PATIENTS WEIGING OVER 33LBS. (15KG): Brand: MEGADYNE

## (undated) DEVICE — LN SMPL CO2 SHTRM SD STREAM W/M LUER

## (undated) DEVICE — IRRIGATOR TOOMEY 70CC

## (undated) DEVICE — APL DUPLOSPRAYER MIS 40CM

## (undated) DEVICE — ADAPT CLN BIOGUARD AIR/H2O DISP